# Patient Record
Sex: MALE | Race: BLACK OR AFRICAN AMERICAN | Employment: OTHER | URBAN - METROPOLITAN AREA
[De-identification: names, ages, dates, MRNs, and addresses within clinical notes are randomized per-mention and may not be internally consistent; named-entity substitution may affect disease eponyms.]

---

## 2017-01-24 ENCOUNTER — HOSPITAL ENCOUNTER (OUTPATIENT)
Dept: MRI IMAGING | Age: 77
Discharge: HOME | End: 2017-01-24
Attending: SPECIALIST
Payer: MEDICARE

## 2017-01-24 DIAGNOSIS — M51.35 DDD (DEGENERATIVE DISC DISEASE), THORACOLUMBAR: ICD-10-CM

## 2017-01-24 LAB — CREAT UR-MCNC: 1 MG/DL (ref 0.6–1.3)

## 2017-01-24 PROCEDURE — 72158 MRI LUMBAR SPINE W/O & W/DYE: CPT

## 2017-01-24 PROCEDURE — 74011250636 HC RX REV CODE- 250/636: Performed by: SPECIALIST

## 2017-01-24 PROCEDURE — A9585 GADOBUTROL INJECTION: HCPCS | Performed by: SPECIALIST

## 2017-01-24 PROCEDURE — 82565 ASSAY OF CREATININE: CPT

## 2017-01-24 RX ADMIN — GADOBUTROL 7.5 ML: 604.72 INJECTION INTRAVENOUS at 16:14

## 2017-02-06 ENCOUNTER — HOSPITAL ENCOUNTER (OUTPATIENT)
Age: 77
Setting detail: OUTPATIENT SURGERY
Discharge: HOME | End: 2017-02-06
Attending: SURGERY | Admitting: SURGERY
Payer: MEDICARE

## 2017-02-06 ENCOUNTER — SURGERY (OUTPATIENT)
Age: 77
End: 2017-02-06

## 2017-02-06 VITALS
WEIGHT: 206.1 LBS | TEMPERATURE: 97.1 F | HEIGHT: 71 IN | RESPIRATION RATE: 16 BRPM | HEART RATE: 67 BPM | SYSTOLIC BLOOD PRESSURE: 127 MMHG | BODY MASS INDEX: 28.85 KG/M2 | OXYGEN SATURATION: 96 % | DIASTOLIC BLOOD PRESSURE: 94 MMHG

## 2017-02-06 PROCEDURE — 74011000250 HC RX REV CODE- 250: Performed by: SURGERY

## 2017-02-06 PROCEDURE — 88305 TISSUE EXAM BY PATHOLOGIST: CPT | Performed by: SURGERY

## 2017-02-06 PROCEDURE — 99152 MOD SED SAME PHYS/QHP 5/>YRS: CPT

## 2017-02-06 PROCEDURE — 74011250636 HC RX REV CODE- 250/636

## 2017-02-06 PROCEDURE — 74011250636 HC RX REV CODE- 250/636: Performed by: SURGERY

## 2017-02-06 PROCEDURE — 88342 IMHCHEM/IMCYTCHM 1ST ANTB: CPT | Performed by: SURGERY

## 2017-02-06 PROCEDURE — 77010033678 HC OXYGEN DAILY

## 2017-02-06 PROCEDURE — 76040000007: Performed by: SURGERY

## 2017-02-06 RX ORDER — ATROPINE SULFATE 0.1 MG/ML
0.5 INJECTION INTRAVENOUS
Status: CANCELLED | OUTPATIENT
Start: 2017-02-06 | End: 2017-02-06

## 2017-02-06 RX ORDER — MIDAZOLAM HYDROCHLORIDE 1 MG/ML
.25-5 INJECTION, SOLUTION INTRAMUSCULAR; INTRAVENOUS
Status: DISCONTINUED | OUTPATIENT
Start: 2017-02-06 | End: 2017-02-06 | Stop reason: HOSPADM

## 2017-02-06 RX ORDER — FLUMAZENIL 0.1 MG/ML
0.2 INJECTION INTRAVENOUS
Status: DISCONTINUED | OUTPATIENT
Start: 2017-02-06 | End: 2017-02-06 | Stop reason: HOSPADM

## 2017-02-06 RX ORDER — SODIUM CHLORIDE 0.9 % (FLUSH) 0.9 %
5-10 SYRINGE (ML) INJECTION AS NEEDED
Status: CANCELLED | OUTPATIENT
Start: 2017-02-06 | End: 2017-02-06

## 2017-02-06 RX ORDER — TESTOSTERONE 10 MG/.5G
GEL, METERED TOPICAL DAILY
COMMUNITY

## 2017-02-06 RX ORDER — TADALAFIL 5 MG/1
5 TABLET ORAL AS NEEDED
COMMUNITY

## 2017-02-06 RX ORDER — EPINEPHRINE 0.1 MG/ML
1 INJECTION INTRACARDIAC; INTRAVENOUS
Status: CANCELLED | OUTPATIENT
Start: 2017-02-06 | End: 2017-02-06

## 2017-02-06 RX ORDER — NALOXONE HYDROCHLORIDE 0.4 MG/ML
0.4 INJECTION, SOLUTION INTRAMUSCULAR; INTRAVENOUS; SUBCUTANEOUS
Status: DISCONTINUED | OUTPATIENT
Start: 2017-02-06 | End: 2017-02-06 | Stop reason: HOSPADM

## 2017-02-06 RX ORDER — SODIUM CHLORIDE 9 MG/ML
100 INJECTION, SOLUTION INTRAVENOUS CONTINUOUS
Status: DISCONTINUED | OUTPATIENT
Start: 2017-02-06 | End: 2017-02-06 | Stop reason: HOSPADM

## 2017-02-06 RX ORDER — DEXTROMETHORPHAN/PSEUDOEPHED 2.5-7.5/.8
1.2 DROPS ORAL
Status: CANCELLED | OUTPATIENT
Start: 2017-02-06

## 2017-02-06 RX ORDER — SODIUM CHLORIDE 0.9 % (FLUSH) 0.9 %
5-10 SYRINGE (ML) INJECTION EVERY 8 HOURS
Status: CANCELLED | OUTPATIENT
Start: 2017-02-06 | End: 2017-02-06

## 2017-02-06 RX ORDER — FENTANYL CITRATE 50 UG/ML
100 INJECTION, SOLUTION INTRAMUSCULAR; INTRAVENOUS
Status: DISCONTINUED | OUTPATIENT
Start: 2017-02-06 | End: 2017-02-06 | Stop reason: HOSPADM

## 2017-02-06 RX ADMIN — MIDAZOLAM HYDROCHLORIDE 1 MG: 1 INJECTION, SOLUTION INTRAMUSCULAR; INTRAVENOUS at 14:24

## 2017-02-06 RX ADMIN — SODIUM CHLORIDE 100 ML/HR: 900 INJECTION, SOLUTION INTRAVENOUS at 15:23

## 2017-02-06 RX ADMIN — SODIUM CHLORIDE 100 ML/HR: 900 INJECTION, SOLUTION INTRAVENOUS at 13:22

## 2017-02-06 RX ADMIN — MIDAZOLAM HYDROCHLORIDE 3 MG: 1 INJECTION, SOLUTION INTRAMUSCULAR; INTRAVENOUS at 14:14

## 2017-02-06 RX ADMIN — FENTANYL CITRATE 25 MCG: 50 INJECTION, SOLUTION INTRAMUSCULAR; INTRAVENOUS at 14:19

## 2017-02-06 RX ADMIN — MIDAZOLAM HYDROCHLORIDE 1 MG: 1 INJECTION, SOLUTION INTRAMUSCULAR; INTRAVENOUS at 14:18

## 2017-02-06 RX ADMIN — FENTANYL CITRATE 50 MCG: 50 INJECTION, SOLUTION INTRAMUSCULAR; INTRAVENOUS at 14:15

## 2017-02-06 RX ADMIN — MIDAZOLAM HYDROCHLORIDE 1 MG: 1 INJECTION, SOLUTION INTRAMUSCULAR; INTRAVENOUS at 14:16

## 2017-02-06 RX ADMIN — MIDAZOLAM HYDROCHLORIDE 1 MG: 1 INJECTION, SOLUTION INTRAMUSCULAR; INTRAVENOUS at 14:17

## 2017-02-06 RX ADMIN — BENZOCAINE 2 SPRAY: 220 SPRAY, METERED PERIODONTAL at 14:12

## 2017-02-06 RX ADMIN — FENTANYL CITRATE 25 MCG: 50 INJECTION, SOLUTION INTRAMUSCULAR; INTRAVENOUS at 14:16

## 2017-02-06 NOTE — DISCHARGE INSTRUCTIONS
Pt may be discharged home   RTO in one week. Diet advance as tolerated. Resume home meds    DISCHARGE SUMMARY from Nurse    The following personal items are in your possession at time of discharge:    Dental Appliances: None                               PATIENT INSTRUCTIONS:    After general anesthesia or intravenous sedation, for 24 hours or while taking prescription Narcotics:  · Limit your activities  · Do not drive and operate hazardous machinery  · Do not make important personal or business decisions  · Do  not drink alcoholic beverages  · If you have not urinated within 8 hours after discharge, please contact your surgeon on call. Report the following to your surgeon:  · Excessive pain, swelling, redness or odor of or around the surgical area  · Temperature over 100.5  · Nausea and vomiting lasting longer than 4 hours or if unable to take medications  · Any signs of decreased circulation or nerve impairment to extremity: change in color, persistent  numbness, tingling, coldness or increase pain  · Any questions        What to do at Home:  Recommended activity: Activity as tolerated and no driving for today,     If you experience any of the following symptoms as above, please follow up with Dr. Sutton Sender. *  Please give a list of your current medications to your Primary Care Provider. *  Please update this list whenever your medications are discontinued, doses are      changed, or new medications (including over-the-counter products) are added. *  Please carry medication information at all times in case of emergency situations. These are general instructions for a healthy lifestyle:    No smoking/ No tobacco products/ Avoid exposure to second hand smoke    Surgeon General's Warning:  Quitting smoking now greatly reduces serious risk to your health.     Obesity, smoking, and sedentary lifestyle greatly increases your risk for illness    A healthy diet, regular physical exercise & weight monitoring are important for maintaining a healthy lifestyle    You may be retaining fluid if you have a history of heart failure or if you experience any of the following symptoms:  Weight gain of 3 pounds or more overnight or 5 pounds in a week, increased swelling in our hands or feet or shortness of breath while lying flat in bed. Please call your doctor as soon as you notice any of these symptoms; do not wait until your next office visit. Recognize signs and symptoms of STROKE:    F-face looks uneven    A-arms unable to move or move unevenly    S-speech slurred or non-existent    T-time-call 911 as soon as signs and symptoms begin-DO NOT go       Back to bed or wait to see if you get better-TIME IS BRAIN. Warning Signs of HEART ATTACK     Call 911 if you have these symptoms:   Chest discomfort. Most heart attacks involve discomfort in the center of the chest that lasts more than a few minutes, or that goes away and comes back. It can feel like uncomfortable pressure, squeezing, fullness, or pain.  Discomfort in other areas of the upper body. Symptoms can include pain or discomfort in one or both arms, the back, neck, jaw, or stomach.  Shortness of breath with or without chest discomfort.  Other signs may include breaking out in a cold sweat, nausea, or lightheadedness. Don't wait more than five minutes to call 911 - MINUTES MATTER! Fast action can save your life. Calling 911 is almost always the fastest way to get lifesaving treatment. Emergency Medical Services staff can begin treatment when they arrive -- up to an hour sooner than if someone gets to the hospital by car. The discharge information has been reviewed with the patient and caregiver. The patient and caregiver verbalized understanding. Discharge medications reviewed with the patient and caregiver and appropriate educational materials and side effects teaching were provided.     Patient armband removed and shredded

## 2017-02-06 NOTE — BRIEF OP NOTE
BRIEF OPERATIVE NOTE    Date of Procedure: 2/6/2017   Preoperative Diagnosis: DIFF SWALLOWING W/GERD  Postoperative Diagnosis: EROSIVE ESOPHAGITIS    Procedure(s):  EGD  Surgeon(s) and Role:     * Antonieta Varghese MD - Primary            Surgical Staff:  Endoscopy Technician-1: Zainab Mina  Endoscopy RN-1: Glory Garcia RN  No case tracking events are documented in the log.   Anesthesia: Con-Sed   Estimated Blood Loss: 1 ml  Specimens:   ID Type Source Tests Collected by Time Destination   1 : biopsy E G JUNCTION Preservative   Antonieta Varghese MD 2/6/2017 1422 Pathology      Findings: erosive esophagitis, possible Zenker's diverticulum   Complications: not any identified during surgery  Implants: * No implants in log *

## 2017-02-06 NOTE — IP AVS SNAPSHOT
Merari Sadler 
 
 
 [address] [facility] [address] 
[phone] Patient: Tramaine Crouch MRN: [id number] GWL:[birth year] You are allergic to the following Allergen Reactions Norvasc (Amlodipine) Cough Tolectin (Tolmetin) Hives Recent Documentation Height Weight BMI Smoking Status 1.803 m 93.5 kg 28.75 kg/m2 Former Smoker Emergency Contacts Name Discharge Info Relation Home Work Mobile Mary iDaz CAREGIVER [3] Friend [5]   [phone] About your hospitalization You were admitted on:  February 6, 2017 You last received care in the:  [facility] ENDOSCOPY You were discharged on:  February 6, 2017 Unit phone number:  [phone] Why you were hospitalized Your primary diagnosis was:  Not on File Providers Seen During Your Hospitalizations Provider Role Specialty Primary office phone Marilynn Escobar MD Attending Provider General Surgery [phone] Your Primary Care Physician (PCP) Primary Care Physician Office Phone Office Fax Carlo Willis [phone] Follow-up Information Follow up With Details Comments Contact Info Rk Wood MD   [address] [place] [address] 
[phone] Current Discharge Medication List  
  
CONTINUE these medications which have NOT CHANGED Dose & Instructions Dispensing Information Comments Morning Noon Evening Bedtime  
 allopurinol 100 mg tablet Commonly known as:  Acquanetta Bunk Your next dose is: Today, Tomorrow Other:  _________ Dose:  100 mg Take 100 mg by mouth daily. Refills:  0 BENEFIBER SUGAR FREE(GUAR GUM) PO Your next dose is: Today, Tomorrow Other:  _________ Take  by mouth daily. Refills:  0 EXFORGE 5-160 mg per tablet Generic drug:  amLODIPine-valsartan Your next dose is: Today, Tomorrow Other:  _________ Dose:  1 Tab Take 1 Tab by mouth daily. Pt instructed to take with a small bit of water on DOS Refills:  0  
     
   
   
   
  
 furosemide 20 mg tablet Commonly known as:  LASIX Your next dose is: Today, Tomorrow Other:  _________ Dose:  20 mg Take 20 mg by mouth as needed. Refills:  0  
     
   
   
   
  
 glyBURIDE-metFORMIN 2.5-500 mg per tablet Commonly known as:  Veronica Shoulders Your next dose is: Today, Tomorrow Other:  _________ Dose:  1 Tab Take 1 Tab by mouth as needed. For Glucose >150 in the evening Refills:  0 LIPITOR 10 mg tablet Generic drug:  atorvastatin Your next dose is: Today, Tomorrow Other:  _________ Dose:  10 mg Take 10 mg by mouth daily. Refills:  0  
     
   
   
   
  
 metoprolol succinate 100 mg tablet Commonly known as:  TOPROL-XL Your next dose is: Today, Tomorrow Other:  _________ Dose:  100 mg Take 100 mg by mouth daily. Take with sip of water dos   Indications: HYPERTENSION Refills:  0 NexIUM 40 mg capsule Generic drug:  esomeprazole Your next dose is: Today, Tomorrow Other:  _________ Dose:  40 mg Take 40 mg by mouth daily. Take with sip of water dos Refills:  0  
     
   
   
   
  
 oxyCODONE-acetaminophen  mg per tablet Commonly known as:  PERCOCET 10 Your next dose is: Today, Tomorrow Other:  _________ Dose:  1 Tab Take 1 Tab by mouth every four (4) hours as needed for Pain. Max Daily Amount: 6 Tabs. Quantity:  35 Tab Refills:  0 PROBIOTIC 4X 10-15 mg Tbec Generic drug:  B.infantis-B.ani-B.long-B.bifi Your next dose is: Today, Tomorrow Other:  _________ Take  by mouth. Refills:  0 ASK your doctor about these medications Dose & Instructions Dispensing Information Comments Morning Noon Evening Bedtime AMBIEN 5 mg tablet Generic drug:  zolpidem Your next dose is: Today, Tomorrow Other:  _________ Dose:  5 mg Take 5 mg by mouth nightly as needed for Sleep. Refills:  0 CIALIS 5 mg tablet Generic drug:  tadalafil Your next dose is: Today, Tomorrow Other:  _________ Dose:  5 mg Take 5 mg by mouth. Refills:  0  
     
   
   
   
  
 FLORASTOR 250 mg capsule Generic drug:  Saccharomyces boulardii Your next dose is: Today, Tomorrow Other:  _________ Dose:  250 mg Take 250 mg by mouth daily as needed. Refills:  0 FORTESTA 10 mg/0.5 gram /actuation Glpm  
Generic drug:  testosterone Your next dose is: Today, Tomorrow Other:  _________  
   
   
 by TransDERmal route. Refills:  0 JANUVIA 100 mg tablet Generic drug:  SITagliptin Your next dose is: Today, Tomorrow Other:  _________ Dose:  100 mg Take 100 mg by mouth daily. Refills:  0 MICARDIS HCT 80-12.5 mg per tablet Generic drug:  telmisartan-hydroCHLOROthiazide Your next dose is: Today, Tomorrow Other:  _________ Dose:  1 Tab Take 1 Tab by mouth daily. Take with sip of water dos  Indications: HYPERTENSION Refills:  0  
     
   
   
   
  
 potassium chloride SR 8 mEq tablet Commonly known as:  KLOR-CON 8 Your next dose is: Today, Tomorrow Other:  _________ Dose:  8 mEq Take 8 mEq by mouth daily as needed. Indications: HYPOKALEMIA PREVENTION Refills:  0  
     
   
   
   
  
 SYSTANE (PROPYLENE GLYCOL) 0.4-0.3 % Drop Generic drug:  peg 400-propylene glycol Your next dose is: Today, Tomorrow Other:  _________ Dose:  1 Drop Administer 1 Drop to left eye two (2) times a day. Indications: DRY EYE Refills:  0 Discharge Instructions Pt may be discharged home RTO in one week. Diet advance as tolerated. Resume home meds DISCHARGE SUMMARY from Nurse The following personal items are in your possession at time of discharge: 
 
Dental Appliances: None PATIENT INSTRUCTIONS: 
 
 
F-face looks uneven A-arms unable to move or move unevenly S-speech slurred or non-existent T-time-call 911 as soon as signs and symptoms begin-DO NOT go Back to bed or wait to see if you get better-TIME IS BRAIN. Warning Signs of HEART ATTACK Call 911 if you have these symptoms: 
? Chest discomfort. Most heart attacks involve discomfort in the center of the chest that lasts more than a few minutes, or that goes away and comes back. It can feel like uncomfortable pressure, squeezing, fullness, or pain. ? Discomfort in other areas of the upper body. Symptoms can include pain or discomfort in one or both arms, the back, neck, jaw, or stomach. ? Shortness of breath with or without chest discomfort. ? Other signs may include breaking out in a cold sweat, nausea, or lightheadedness. Don't wait more than five minutes to call [address]! Fast action can save your life. Calling 911 is almost always the fastest way to get lifesaving treatment. Emergency Medical Services staff can begin treatment when they arrive  up to an hour sooner than if someone gets to the hospital by car.   
 
 
The discharge information has been reviewed with the patient and caregiver. The patient and caregiver verbalized understanding. Discharge medications reviewed with the patient and caregiver and appropriate educational materials and side effects teaching were provided. Patient armband removed and shredded Discharge Orders None Introducing [facility]! [facility] introduces [employer] patient portal. Now you can access parts of your medical record, email your doctor's office, and request medication refills online. 1. In your internet browser, go to [email or web address] 2. Click on the First Time User? Click Here link in the Sign In box. You will see the New Member Sign Up page. 3. Enter your [employer] Access Code exactly as it appears below. You will not need to use this code after youve completed the sign-up process. If you do not sign up before the expiration date, you must request a new code. · [employer] Access Code: [email or web address] Expires: 4/20/2017 10:21 AM 
 
4. Enter the last four digits of your Social Security Number (xxxx) and Date of Birth (mm/dd/yyyy) as indicated and click Submit. You will be taken to the next sign-up page. 5. Create a N[employer]osh[employer] ID. This will be your N[employer] login ID and cannot be changed, so think of one that is secure and easy to remember. 6. Create a N[employer] password. You can change your password at any time. 7. Enter your Password Reset Question and Answer. This can be used at a later time if you forget your password. 8. Enter your e-mail address. You will receive e-mail notification when new information is available in [address] 9. Click Sign Up. You can now view and download portions of your medical record. 10. Click the Download Summary menu link to download a portable copy of your medical information. If you have questions, please visit the Frequently Asked Questions section of the [employer] website.  Remember, [employer] is NOT to be used for urgent needs. For medical emergencies, dial 911. Now available from your iPhone and Android! General Information Please provide this summary of care documentation to your next provider. Patient Signature:  ____________________________________________________________ Date:  ____________________________________________________________  
  
Aylett Shove Provider Signature:  ____________________________________________________________ Date:  ____________________________________________________________

## 2017-02-06 NOTE — PROCEDURES
[address] [place] [address]  PROCEDURE NOTE    Name:  Miguel A Han  MR#:  [id number]  :  1940  Account #:  [de-identified]  Date of Adm:  2017  Date of Procedure:  2017      PREOPERATIVE DIAGNOSIS: Erosive esophagitis with possible  Schatzki ring. POSTOPERATIVE DIAGNOSES  1. Severe erosive esophagitis. 2. Edema in the antrum of the stomach with poor emptying of the  stomach contained a fair amount of fluid. 3. Normal duodenum. 4. Possible Zenker diverticulum. PROCEDURES PERFORMED: Upper gastrointestinal endoscopy with  biopsy. SURGEON: Ashleigh Hernandez MD    ANESTHESIA: Versed, a total of 7 mg and fentanyl, a total of 100 mcg. ESTIMATED BLOOD LOSS: Nil, 1 mL. FLUID REPLACEMENT: Approximately 200 mL. DRAINS: None. SPECIMENS REMOVED: Biopsies from the GE junction. COMPLICATIONS: There is no complication identified during operative  procedure. INDICATIONS FOR PROCEDURE: This is a very pleasant 70-year-old  gentleman who has been having difficulty with swallowing. About 6  months ago, we performed an upper GI endoscopy and noted rather  extensive erosion of the GE junction. The question of a Schatzki ring  developing further disease process was a major concern. We did put  the patient on Nexium at that time, and he has been on it since then. The patient has been advised to follow up upper GI endoscopy. DESCRIPTION OF PROCEDURE: The patient was taken to the  endoscopy suite on 2017. On the gurney, the patient had  intravenous access established, appropriate monitoring  technique established. Then, the posterior oropharynx was thoroughly  anesthetized with Cetacaine spray. He was given increments of  Versed and fentanyl to reach the final destination above. The patient  was placed in his left lateral decubitus position for best aspiration and  protection.  The fiberoptic endoscope was introduced in the oropharynx,  down the esophagus, into the stomach, which again was quite  edematous and finally into the antrum, pylorus, and duodenum. We  were well down into the C portion of the duodenum. There were no  ulcerations or scarring noted in the duodenum. Back in the antrum and  body of the stomach, we identified a fair amount of edema, fair amount  of retained fluid which appeared to be a poor emptying process. Scope  was retroflexed upon itself, did demonstrate a hiatal hernia with  evidence of reflux. We posed the scope back to a straight position and  we identified no masses in the stomach, body, fundus, or antrum  region. At the GE junction, there is extensive erosion noted. Biopsies  were taken. The esophagus itself appeared somewhat tortuous, but no  intraluminal lesions noted. Back at the posterior oropharynx, the  patient had a small opening off to the right side of his oropharyngeal  junction that would suggest possible early Zenker diverticulum. No  biopsies were taken of this. At the posterior oropharynx, larynx, the  vocal cords were visualized. There appeared to be a slight amount of  edema along the posterior oropharynx, larynx, and vocal cords. There  were no lesions noted. No bleeding. At this point, the procedure was  complete and the patient was taken to the recovery room for further  observation and recovery. Please note that multiple biopsies were  taken at the GE junction and we will await the final results.         MD MARIETTA Blanco / ELLIOT  D:  02/06/2017   14:50  T:  02/06/2017   15:33  Job #:  [id number]

## 2017-07-24 ENCOUNTER — HOSPITAL ENCOUNTER (OUTPATIENT)
Dept: PREADMISSION TESTING | Age: 77
Discharge: HOME | DRG: 517 | End: 2017-07-24
Payer: MEDICARE

## 2017-07-24 LAB
ANION GAP BLD CALC-SCNC: 10 MMOL/L (ref 3–18)
ATRIAL RATE: 74 BPM
BACTERIA SPEC CULT: NORMAL
BUN SERPL-MCNC: 21 MG/DL (ref 7–18)
BUN/CREAT SERPL: 15 (ref 12–20)
CALCIUM SERPL-MCNC: 9 MG/DL (ref 8.5–10.1)
CALCULATED P AXIS, ECG09: 48 DEGREES
CALCULATED R AXIS, ECG10: -41 DEGREES
CALCULATED T AXIS, ECG11: 40 DEGREES
CHLORIDE SERPL-SCNC: 104 MMOL/L (ref 100–108)
CO2 SERPL-SCNC: 24 MMOL/L (ref 21–32)
CREAT SERPL-MCNC: 1.38 MG/DL (ref 0.6–1.3)
DIAGNOSIS, 93000: NORMAL
EST. AVERAGE GLUCOSE BLD GHB EST-MCNC: 140 MG/DL
GLUCOSE SERPL-MCNC: 180 MG/DL (ref 74–99)
HBA1C MFR BLD: 6.5 % (ref 4.5–5.6)
HCT VFR BLD AUTO: 38.4 % (ref 36–48)
HGB BLD-MCNC: 13.3 G/DL (ref 13–16)
P-R INTERVAL, ECG05: 170 MS
POTASSIUM SERPL-SCNC: 3.7 MMOL/L (ref 3.5–5.5)
Q-T INTERVAL, ECG07: 370 MS
QRS DURATION, ECG06: 86 MS
QTC CALCULATION (BEZET), ECG08: 410 MS
SERVICE CMNT-IMP: NORMAL
SODIUM SERPL-SCNC: 138 MMOL/L (ref 136–145)
VENTRICULAR RATE, ECG03: 74 BPM

## 2017-07-25 ENCOUNTER — ANESTHESIA EVENT (OUTPATIENT)
Dept: SURGERY | Age: 77
DRG: 517 | End: 2017-07-25
Payer: MEDICARE

## 2017-07-26 ENCOUNTER — APPOINTMENT (OUTPATIENT)
Dept: GENERAL RADIOLOGY | Age: 77
DRG: 517 | End: 2017-07-26
Attending: SPECIALIST
Payer: MEDICARE

## 2017-07-26 ENCOUNTER — HOSPITAL ENCOUNTER (INPATIENT)
Age: 77
LOS: 1 days | Discharge: HOME | DRG: 517 | End: 2017-07-27
Attending: SPECIALIST | Admitting: SPECIALIST
Payer: MEDICARE

## 2017-07-26 ENCOUNTER — ANESTHESIA (OUTPATIENT)
Dept: SURGERY | Age: 77
DRG: 517 | End: 2017-07-26
Payer: MEDICARE

## 2017-07-26 PROBLEM — M48.062 LUMBAR STENOSIS WITH NEUROGENIC CLAUDICATION: Status: ACTIVE | Noted: 2017-07-26

## 2017-07-26 LAB
ABO + RH BLD: NORMAL
BLOOD GROUP ANTIBODIES SERPL: NORMAL
GLUCOSE BLD STRIP.AUTO-MCNC: 102 MG/DL (ref 70–110)
GLUCOSE BLD STRIP.AUTO-MCNC: 116 MG/DL (ref 70–110)
GLUCOSE BLD STRIP.AUTO-MCNC: 127 MG/DL (ref 70–110)
GLUCOSE BLD STRIP.AUTO-MCNC: 284 MG/DL (ref 70–110)
GLUCOSE BLD STRIP.AUTO-MCNC: 466 MG/DL (ref 70–110)
GLUCOSE SERPL-MCNC: 446 MG/DL (ref 74–99)
SPECIMEN EXP DATE BLD: NORMAL

## 2017-07-26 PROCEDURE — 77030003666 HC NDL SPINAL BD -A: Performed by: SPECIALIST

## 2017-07-26 PROCEDURE — 01NB0ZZ RELEASE LUMBAR NERVE, OPEN APPROACH: ICD-10-PCS | Performed by: SPECIALIST

## 2017-07-26 PROCEDURE — 97161 PT EVAL LOW COMPLEX 20 MIN: CPT

## 2017-07-26 PROCEDURE — 77030013079 HC BLNKT BAIR HGGR 3M -A: Performed by: SPECIALIST

## 2017-07-26 PROCEDURE — 77030004391 HC BUR FLUT MEDT -C: Performed by: SPECIALIST

## 2017-07-26 PROCEDURE — 86900 BLOOD TYPING SEROLOGIC ABO: CPT | Performed by: SPECIALIST

## 2017-07-26 PROCEDURE — 74011250636 HC RX REV CODE- 250/636: Performed by: SPECIALIST

## 2017-07-26 PROCEDURE — 82962 GLUCOSE BLOOD TEST: CPT

## 2017-07-26 PROCEDURE — 77030032490 HC SLV COMPR SCD KNE COVD -B: Performed by: SPECIALIST

## 2017-07-26 PROCEDURE — 82947 ASSAY GLUCOSE BLOOD QUANT: CPT

## 2017-07-26 PROCEDURE — 74011636637 HC RX REV CODE- 636/637: Performed by: SPECIALIST

## 2017-07-26 PROCEDURE — 77030014005 HC SPNG HEMSTAT GEL CARD -B: Performed by: SPECIALIST

## 2017-07-26 PROCEDURE — 74011000250 HC RX REV CODE- 250

## 2017-07-26 PROCEDURE — 74011250636 HC RX REV CODE- 250/636

## 2017-07-26 PROCEDURE — 77030008477 HC STYL SATN SLP COVD -A: Performed by: SPECIALIST

## 2017-07-26 PROCEDURE — 77030014007 HC SPNG HEMSTAT J&J -B: Performed by: SPECIALIST

## 2017-07-26 PROCEDURE — 74011250636 HC RX REV CODE- 250/636: Performed by: ANESTHESIOLOGY

## 2017-07-26 PROCEDURE — 77030004373 HC BUR DMND MEDT -C: Performed by: SPECIALIST

## 2017-07-26 PROCEDURE — 77030010507 HC ADH SKN DERMBND J&J -B: Performed by: SPECIALIST

## 2017-07-26 PROCEDURE — 77030003029 HC SUT VCRL J&J -B: Performed by: SPECIALIST

## 2017-07-26 PROCEDURE — 76210000006 HC OR PH I REC 0.5 TO 1 HR: Performed by: SPECIALIST

## 2017-07-26 PROCEDURE — 77030011640 HC PAD GRND REM COVD -A: Performed by: SPECIALIST

## 2017-07-26 PROCEDURE — 74011000250 HC RX REV CODE- 250: Performed by: SPECIALIST

## 2017-07-26 PROCEDURE — 36415 COLL VENOUS BLD VENIPUNCTURE: CPT | Performed by: SPECIALIST

## 2017-07-26 PROCEDURE — 77030018836 HC SOL IRR NACL ICUM -A: Performed by: SPECIALIST

## 2017-07-26 PROCEDURE — 77030002935 HC SUT MCRYL J&J -C: Performed by: SPECIALIST

## 2017-07-26 PROCEDURE — 77030031139 HC SUT VCRL2 J&J -A: Performed by: SPECIALIST

## 2017-07-26 PROCEDURE — 77030008683 HC TU ET CUF COVD -A: Performed by: SPECIALIST

## 2017-07-26 PROCEDURE — 65270000029 HC RM PRIVATE

## 2017-07-26 PROCEDURE — 74011250637 HC RX REV CODE- 250/637: Performed by: SPECIALIST

## 2017-07-26 PROCEDURE — 97116 GAIT TRAINING THERAPY: CPT

## 2017-07-26 PROCEDURE — 76060000034 HC ANESTHESIA 1.5 TO 2 HR: Performed by: SPECIALIST

## 2017-07-26 PROCEDURE — 76010000153 HC OR TIME 1.5 TO 2 HR: Performed by: SPECIALIST

## 2017-07-26 PROCEDURE — 77030020782 HC GWN BAIR PAWS FLX 3M -B: Performed by: SPECIALIST

## 2017-07-26 RX ORDER — TELMISARTAN 80 MG/1
80 TABLET ORAL DAILY
Status: DISCONTINUED | OUTPATIENT
Start: 2017-07-27 | End: 2017-07-27 | Stop reason: HOSPADM

## 2017-07-26 RX ORDER — CEFAZOLIN SODIUM 2 G/50ML
2 SOLUTION INTRAVENOUS EVERY 8 HOURS
Status: COMPLETED | OUTPATIENT
Start: 2017-07-26 | End: 2017-07-27

## 2017-07-26 RX ORDER — POLYETHYLENE GLYCOL 3350 17 G/17G
17 POWDER, FOR SOLUTION ORAL DAILY PRN
Status: DISCONTINUED | OUTPATIENT
Start: 2017-07-26 | End: 2017-07-27 | Stop reason: HOSPADM

## 2017-07-26 RX ORDER — FENTANYL CITRATE 50 UG/ML
INJECTION, SOLUTION INTRAMUSCULAR; INTRAVENOUS AS NEEDED
Status: DISCONTINUED | OUTPATIENT
Start: 2017-07-26 | End: 2017-07-26 | Stop reason: HOSPADM

## 2017-07-26 RX ORDER — SODIUM CHLORIDE 0.9 % (FLUSH) 0.9 %
5-10 SYRINGE (ML) INJECTION EVERY 8 HOURS
Status: DISCONTINUED | OUTPATIENT
Start: 2017-07-26 | End: 2017-07-27 | Stop reason: HOSPADM

## 2017-07-26 RX ORDER — SODIUM CHLORIDE 0.9 % (FLUSH) 0.9 %
5-10 SYRINGE (ML) INJECTION AS NEEDED
Status: DISCONTINUED | OUTPATIENT
Start: 2017-07-26 | End: 2017-07-27 | Stop reason: HOSPADM

## 2017-07-26 RX ORDER — DEXTROSE 50 % IN WATER (D50W) INTRAVENOUS SYRINGE
25-50 AS NEEDED
Status: DISCONTINUED | OUTPATIENT
Start: 2017-07-26 | End: 2017-07-27 | Stop reason: HOSPADM

## 2017-07-26 RX ORDER — AMLODIPINE BESYLATE 5 MG/1
10 TABLET ORAL DAILY
Status: DISCONTINUED | OUTPATIENT
Start: 2017-07-27 | End: 2017-07-27 | Stop reason: HOSPADM

## 2017-07-26 RX ORDER — SODIUM CHLORIDE, SODIUM LACTATE, POTASSIUM CHLORIDE, CALCIUM CHLORIDE 600; 310; 30; 20 MG/100ML; MG/100ML; MG/100ML; MG/100ML
125 INJECTION, SOLUTION INTRAVENOUS CONTINUOUS
Status: DISCONTINUED | OUTPATIENT
Start: 2017-07-26 | End: 2017-07-27 | Stop reason: HOSPADM

## 2017-07-26 RX ORDER — MAGNESIUM SULFATE 100 %
4 CRYSTALS MISCELLANEOUS AS NEEDED
Status: DISCONTINUED | OUTPATIENT
Start: 2017-07-26 | End: 2017-07-26 | Stop reason: HOSPADM

## 2017-07-26 RX ORDER — FUROSEMIDE 20 MG/1
20 TABLET ORAL DAILY PRN
Status: DISCONTINUED | OUTPATIENT
Start: 2017-07-26 | End: 2017-07-27 | Stop reason: HOSPADM

## 2017-07-26 RX ORDER — OXYCODONE AND ACETAMINOPHEN 5; 325 MG/1; MG/1
1 TABLET ORAL
Status: DISCONTINUED | OUTPATIENT
Start: 2017-07-26 | End: 2017-07-27 | Stop reason: HOSPADM

## 2017-07-26 RX ORDER — FENTANYL CITRATE 50 UG/ML
25 INJECTION, SOLUTION INTRAMUSCULAR; INTRAVENOUS
Status: CANCELLED | OUTPATIENT
Start: 2017-07-26

## 2017-07-26 RX ORDER — HYDROCHLOROTHIAZIDE 25 MG/1
12.5 TABLET ORAL DAILY
Status: DISCONTINUED | OUTPATIENT
Start: 2017-07-27 | End: 2017-07-27 | Stop reason: HOSPADM

## 2017-07-26 RX ORDER — SODIUM CHLORIDE 0.9 % (FLUSH) 0.9 %
5-10 SYRINGE (ML) INJECTION AS NEEDED
Status: DISCONTINUED | OUTPATIENT
Start: 2017-07-26 | End: 2017-07-26 | Stop reason: HOSPADM

## 2017-07-26 RX ORDER — ONDANSETRON 2 MG/ML
INJECTION INTRAMUSCULAR; INTRAVENOUS AS NEEDED
Status: DISCONTINUED | OUTPATIENT
Start: 2017-07-26 | End: 2017-07-26 | Stop reason: HOSPADM

## 2017-07-26 RX ORDER — INSULIN LISPRO 100 [IU]/ML
INJECTION, SOLUTION INTRAVENOUS; SUBCUTANEOUS ONCE
Status: ACTIVE | OUTPATIENT
Start: 2017-07-26 | End: 2017-07-26

## 2017-07-26 RX ORDER — NEOSTIGMINE METHYLSULFATE 1 MG/ML
INJECTION INTRAVENOUS AS NEEDED
Status: DISCONTINUED | OUTPATIENT
Start: 2017-07-26 | End: 2017-07-26 | Stop reason: HOSPADM

## 2017-07-26 RX ORDER — PROPOFOL 10 MG/ML
INJECTION, EMULSION INTRAVENOUS AS NEEDED
Status: DISCONTINUED | OUTPATIENT
Start: 2017-07-26 | End: 2017-07-26 | Stop reason: HOSPADM

## 2017-07-26 RX ORDER — DIPHENHYDRAMINE HCL 25 MG
25 CAPSULE ORAL
Status: DISCONTINUED | OUTPATIENT
Start: 2017-07-26 | End: 2017-07-27 | Stop reason: HOSPADM

## 2017-07-26 RX ORDER — DEXAMETHASONE SODIUM PHOSPHATE 4 MG/ML
INJECTION, SOLUTION INTRA-ARTICULAR; INTRALESIONAL; INTRAMUSCULAR; INTRAVENOUS; SOFT TISSUE AS NEEDED
Status: DISCONTINUED | OUTPATIENT
Start: 2017-07-26 | End: 2017-07-26 | Stop reason: HOSPADM

## 2017-07-26 RX ORDER — SODIUM CHLORIDE, SODIUM LACTATE, POTASSIUM CHLORIDE, CALCIUM CHLORIDE 600; 310; 30; 20 MG/100ML; MG/100ML; MG/100ML; MG/100ML
100 INJECTION, SOLUTION INTRAVENOUS CONTINUOUS
Status: DISCONTINUED | OUTPATIENT
Start: 2017-07-26 | End: 2017-07-26 | Stop reason: HOSPADM

## 2017-07-26 RX ORDER — SENNOSIDES 8.6 MG/1
1 TABLET ORAL 2 TIMES DAILY
Status: DISCONTINUED | OUTPATIENT
Start: 2017-07-26 | End: 2017-07-27 | Stop reason: HOSPADM

## 2017-07-26 RX ORDER — CHOLECALCIFEROL (VITAMIN D3) 125 MCG
5 CAPSULE ORAL
Status: DISCONTINUED | OUTPATIENT
Start: 2017-07-26 | End: 2017-07-27 | Stop reason: HOSPADM

## 2017-07-26 RX ORDER — GLYBURIDE 5 MG/1
2.5 TABLET ORAL DAILY PRN
Status: DISCONTINUED | OUTPATIENT
Start: 2017-07-26 | End: 2017-07-27 | Stop reason: HOSPADM

## 2017-07-26 RX ORDER — INSULIN LISPRO 100 [IU]/ML
INJECTION, SOLUTION INTRAVENOUS; SUBCUTANEOUS
Status: DISCONTINUED | OUTPATIENT
Start: 2017-07-26 | End: 2017-07-27

## 2017-07-26 RX ORDER — SODIUM CHLORIDE, SODIUM LACTATE, POTASSIUM CHLORIDE, CALCIUM CHLORIDE 600; 310; 30; 20 MG/100ML; MG/100ML; MG/100ML; MG/100ML
75 INJECTION, SOLUTION INTRAVENOUS CONTINUOUS
Status: DISCONTINUED | OUTPATIENT
Start: 2017-07-26 | End: 2017-07-27 | Stop reason: HOSPADM

## 2017-07-26 RX ORDER — LIDOCAINE HYDROCHLORIDE 20 MG/ML
INJECTION, SOLUTION EPIDURAL; INFILTRATION; INTRACAUDAL; PERINEURAL AS NEEDED
Status: DISCONTINUED | OUTPATIENT
Start: 2017-07-26 | End: 2017-07-26 | Stop reason: HOSPADM

## 2017-07-26 RX ORDER — OXYCODONE AND ACETAMINOPHEN 5; 325 MG/1; MG/1
1 TABLET ORAL
COMMUNITY
End: 2017-07-27

## 2017-07-26 RX ORDER — METOPROLOL SUCCINATE 100 MG/1
100 TABLET, EXTENDED RELEASE ORAL DAILY
Status: DISCONTINUED | OUTPATIENT
Start: 2017-07-27 | End: 2017-07-27 | Stop reason: HOSPADM

## 2017-07-26 RX ORDER — DEXTROSE 50 % IN WATER (D50W) INTRAVENOUS SYRINGE
25-50 AS NEEDED
Status: DISCONTINUED | OUTPATIENT
Start: 2017-07-26 | End: 2017-07-26 | Stop reason: HOSPADM

## 2017-07-26 RX ORDER — CEFAZOLIN SODIUM 2 G/50ML
2 SOLUTION INTRAVENOUS ONCE
Status: COMPLETED | OUTPATIENT
Start: 2017-07-26 | End: 2017-07-26

## 2017-07-26 RX ORDER — METFORMIN HYDROCHLORIDE 500 MG/1
500 TABLET ORAL DAILY PRN
Status: DISCONTINUED | OUTPATIENT
Start: 2017-07-26 | End: 2017-07-27 | Stop reason: HOSPADM

## 2017-07-26 RX ORDER — DEXAMETHASONE SODIUM PHOSPHATE 4 MG/ML
4 INJECTION, SOLUTION INTRA-ARTICULAR; INTRALESIONAL; INTRAMUSCULAR; INTRAVENOUS; SOFT TISSUE EVERY 8 HOURS
Status: DISCONTINUED | OUTPATIENT
Start: 2017-07-26 | End: 2017-07-27

## 2017-07-26 RX ORDER — VALSARTAN 160 MG/1
160 TABLET ORAL DAILY
Status: DISCONTINUED | OUTPATIENT
Start: 2017-07-27 | End: 2017-07-27 | Stop reason: HOSPADM

## 2017-07-26 RX ORDER — NALOXONE HYDROCHLORIDE 0.4 MG/ML
0.2 INJECTION, SOLUTION INTRAMUSCULAR; INTRAVENOUS; SUBCUTANEOUS AS NEEDED
Status: DISCONTINUED | OUTPATIENT
Start: 2017-07-26 | End: 2017-07-26 | Stop reason: HOSPADM

## 2017-07-26 RX ORDER — MIDAZOLAM HYDROCHLORIDE 1 MG/ML
INJECTION, SOLUTION INTRAMUSCULAR; INTRAVENOUS AS NEEDED
Status: DISCONTINUED | OUTPATIENT
Start: 2017-07-26 | End: 2017-07-26 | Stop reason: HOSPADM

## 2017-07-26 RX ORDER — NALOXONE HYDROCHLORIDE 0.4 MG/ML
0.1 INJECTION, SOLUTION INTRAMUSCULAR; INTRAVENOUS; SUBCUTANEOUS AS NEEDED
Status: DISCONTINUED | OUTPATIENT
Start: 2017-07-26 | End: 2017-07-27 | Stop reason: HOSPADM

## 2017-07-26 RX ORDER — MAGNESIUM SULFATE 100 %
4 CRYSTALS MISCELLANEOUS AS NEEDED
Status: DISCONTINUED | OUTPATIENT
Start: 2017-07-26 | End: 2017-07-27 | Stop reason: HOSPADM

## 2017-07-26 RX ORDER — HYDROMORPHONE HYDROCHLORIDE 1 MG/ML
0.5 INJECTION, SOLUTION INTRAMUSCULAR; INTRAVENOUS; SUBCUTANEOUS
Status: DISCONTINUED | OUTPATIENT
Start: 2017-07-26 | End: 2017-07-26 | Stop reason: HOSPADM

## 2017-07-26 RX ORDER — AMLODIPINE BESYLATE 5 MG/1
5 TABLET ORAL DAILY
Status: DISCONTINUED | OUTPATIENT
Start: 2017-07-27 | End: 2017-07-26

## 2017-07-26 RX ORDER — POTASSIUM CHLORIDE 750 MG/1
10 TABLET, EXTENDED RELEASE ORAL DAILY
Status: DISCONTINUED | OUTPATIENT
Start: 2017-07-26 | End: 2017-07-27 | Stop reason: HOSPADM

## 2017-07-26 RX ORDER — ONDANSETRON 2 MG/ML
4 INJECTION INTRAMUSCULAR; INTRAVENOUS AS NEEDED
Status: DISCONTINUED | OUTPATIENT
Start: 2017-07-26 | End: 2017-07-27 | Stop reason: HOSPADM

## 2017-07-26 RX ORDER — ZOLPIDEM TARTRATE 5 MG/1
5 TABLET ORAL
Status: DISCONTINUED | OUTPATIENT
Start: 2017-07-26 | End: 2017-07-27 | Stop reason: HOSPADM

## 2017-07-26 RX ORDER — NALOXONE HYDROCHLORIDE 0.4 MG/ML
0.2 INJECTION, SOLUTION INTRAMUSCULAR; INTRAVENOUS; SUBCUTANEOUS AS NEEDED
Status: DISCONTINUED | OUTPATIENT
Start: 2017-07-26 | End: 2017-07-26 | Stop reason: SDUPTHER

## 2017-07-26 RX ORDER — AMLODIPINE AND VALSARTAN 10; 160 MG/1; MG/1
1 TABLET ORAL DAILY
COMMUNITY

## 2017-07-26 RX ORDER — CARBOXYMETHYLCELLULOSE SODIUM 5 MG/ML
1 SOLUTION/ DROPS OPHTHALMIC 2 TIMES DAILY
Status: DISCONTINUED | OUTPATIENT
Start: 2017-07-26 | End: 2017-07-27 | Stop reason: HOSPADM

## 2017-07-26 RX ORDER — ALLOPURINOL 100 MG/1
100 TABLET ORAL 2 TIMES DAILY
Status: DISCONTINUED | OUTPATIENT
Start: 2017-07-26 | End: 2017-07-27 | Stop reason: HOSPADM

## 2017-07-26 RX ORDER — EPHEDRINE SULFATE/0.9% NACL/PF 25 MG/5 ML
SYRINGE (ML) INTRAVENOUS AS NEEDED
Status: DISCONTINUED | OUTPATIENT
Start: 2017-07-26 | End: 2017-07-26 | Stop reason: HOSPADM

## 2017-07-26 RX ORDER — NALOXONE HYDROCHLORIDE 0.4 MG/ML
0.4 INJECTION, SOLUTION INTRAMUSCULAR; INTRAVENOUS; SUBCUTANEOUS AS NEEDED
Status: DISCONTINUED | OUTPATIENT
Start: 2017-07-26 | End: 2017-07-27 | Stop reason: HOSPADM

## 2017-07-26 RX ORDER — ATORVASTATIN CALCIUM 10 MG/1
10 TABLET, FILM COATED ORAL
Status: DISCONTINUED | OUTPATIENT
Start: 2017-07-26 | End: 2017-07-27 | Stop reason: HOSPADM

## 2017-07-26 RX ORDER — INSULIN LISPRO 100 [IU]/ML
INJECTION, SOLUTION INTRAVENOUS; SUBCUTANEOUS ONCE
Status: DISCONTINUED | OUTPATIENT
Start: 2017-07-26 | End: 2017-07-26 | Stop reason: HOSPADM

## 2017-07-26 RX ORDER — ROCURONIUM BROMIDE 10 MG/ML
INJECTION, SOLUTION INTRAVENOUS AS NEEDED
Status: DISCONTINUED | OUTPATIENT
Start: 2017-07-26 | End: 2017-07-26 | Stop reason: HOSPADM

## 2017-07-26 RX ORDER — GLYCOPYRROLATE 0.2 MG/ML
INJECTION INTRAMUSCULAR; INTRAVENOUS AS NEEDED
Status: DISCONTINUED | OUTPATIENT
Start: 2017-07-26 | End: 2017-07-26 | Stop reason: HOSPADM

## 2017-07-26 RX ADMIN — SODIUM CHLORIDE, SODIUM LACTATE, POTASSIUM CHLORIDE, AND CALCIUM CHLORIDE: 600; 310; 30; 20 INJECTION, SOLUTION INTRAVENOUS at 08:45

## 2017-07-26 RX ADMIN — SODIUM CHLORIDE, SODIUM LACTATE, POTASSIUM CHLORIDE, AND CALCIUM CHLORIDE 125 ML/HR: 600; 310; 30; 20 INJECTION, SOLUTION INTRAVENOUS at 07:05

## 2017-07-26 RX ADMIN — INSULIN LISPRO 12 UNITS: 100 INJECTION, SOLUTION INTRAVENOUS; SUBCUTANEOUS at 22:34

## 2017-07-26 RX ADMIN — HYDROMORPHONE HYDROCHLORIDE: 10 INJECTION, SOLUTION INTRAMUSCULAR; INTRAVENOUS; SUBCUTANEOUS at 10:23

## 2017-07-26 RX ADMIN — FENTANYL CITRATE 50 MCG: 50 INJECTION, SOLUTION INTRAMUSCULAR; INTRAVENOUS at 08:17

## 2017-07-26 RX ADMIN — LIDOCAINE HYDROCHLORIDE 80 MG: 20 INJECTION, SOLUTION EPIDURAL; INFILTRATION; INTRACAUDAL; PERINEURAL at 08:17

## 2017-07-26 RX ADMIN — CEFAZOLIN SODIUM 2 G: 2 SOLUTION INTRAVENOUS at 16:06

## 2017-07-26 RX ADMIN — CEFAZOLIN SODIUM 2 G: 2 SOLUTION INTRAVENOUS at 08:10

## 2017-07-26 RX ADMIN — ALLOPURINOL 100 MG: 100 TABLET ORAL at 20:48

## 2017-07-26 RX ADMIN — PROPOFOL 170 MG: 10 INJECTION, EMULSION INTRAVENOUS at 08:17

## 2017-07-26 RX ADMIN — CEFAZOLIN SODIUM 2 G: 2 SOLUTION INTRAVENOUS at 23:34

## 2017-07-26 RX ADMIN — DEXAMETHASONE SODIUM PHOSPHATE 8 MG: 4 INJECTION, SOLUTION INTRA-ARTICULAR; INTRALESIONAL; INTRAMUSCULAR; INTRAVENOUS; SOFT TISSUE at 08:40

## 2017-07-26 RX ADMIN — SITAGLIPTIN 100 MG: 100 TABLET, FILM COATED ORAL at 18:27

## 2017-07-26 RX ADMIN — Medication 10 MG: at 08:49

## 2017-07-26 RX ADMIN — DEXAMETHASONE SODIUM PHOSPHATE 4 MG: 4 INJECTION, SOLUTION INTRAMUSCULAR; INTRAVENOUS at 18:28

## 2017-07-26 RX ADMIN — MIDAZOLAM HYDROCHLORIDE 2 MG: 1 INJECTION, SOLUTION INTRAMUSCULAR; INTRAVENOUS at 08:10

## 2017-07-26 RX ADMIN — POTASSIUM CHLORIDE 10 MEQ: 10 TABLET, EXTENDED RELEASE ORAL at 18:27

## 2017-07-26 RX ADMIN — METFORMIN HYDROCHLORIDE 500 MG: 500 TABLET, FILM COATED ORAL at 19:19

## 2017-07-26 RX ADMIN — ROCURONIUM BROMIDE 50 MG: 10 INJECTION, SOLUTION INTRAVENOUS at 08:17

## 2017-07-26 RX ADMIN — GLYCOPYRROLATE 0.4 MG: 0.2 INJECTION INTRAMUSCULAR; INTRAVENOUS at 09:25

## 2017-07-26 RX ADMIN — NEOSTIGMINE METHYLSULFATE 2 MG: 1 INJECTION INTRAVENOUS at 09:25

## 2017-07-26 RX ADMIN — CARBOXYMETHYLCELLULOSE SODIUM 1 DROP: 5 SOLUTION/ DROPS OPHTHALMIC at 20:47

## 2017-07-26 RX ADMIN — Medication 10 ML: at 20:49

## 2017-07-26 RX ADMIN — Medication 5 MG: at 20:49

## 2017-07-26 RX ADMIN — ATORVASTATIN CALCIUM 10 MG: 10 TABLET, FILM COATED ORAL at 20:48

## 2017-07-26 RX ADMIN — FENTANYL CITRATE 50 MCG: 50 INJECTION, SOLUTION INTRAMUSCULAR; INTRAVENOUS at 08:44

## 2017-07-26 RX ADMIN — GLYCOPYRROLATE 0.2 MG: 0.2 INJECTION INTRAMUSCULAR; INTRAVENOUS at 09:42

## 2017-07-26 RX ADMIN — ONDANSETRON 4 MG: 2 INJECTION INTRAMUSCULAR; INTRAVENOUS at 08:58

## 2017-07-26 NOTE — PERIOP NOTES
Called 2 [employer] for [employer] to look at [employer] and call me back for report

## 2017-07-26 NOTE — IP AVS SNAPSHOT
Alicia Durand 
 
 
 [address] [place] [id number] 
[phone] Patient: Nichole Solis MRN: [id number] KOE:[birth year]11 Current Discharge Medication List  
  
CONTINUE these medications which have NOT CHANGED Dose & Instructions Dispensing Information Comments Morning Noon Evening Bedtime  
 allopurinol 100 mg tablet Commonly known as:  Evorn Slight Your last dose was: Your next dose is:    
   
   
 Dose:  100 mg Take 100 mg by mouth two (2) times a day. Refills:  0  
     
   
   
   
  
 aspirin delayed-release 81 mg tablet Your last dose was: Your next dose is: Take  by mouth daily. Refills:  0 CIALIS 5 mg tablet Generic drug:  tadalafil Your last dose was: Your next dose is:    
   
   
 Dose:  5 mg Take 5 mg by mouth as needed. Refills:  0 EXFORGE  mg per tablet Generic drug:  amLODIPine-valsartan Your last dose was: Your next dose is:    
   
   
 Dose:  1 Tab Take 1 Tab by mouth daily. Refills:  0 FORTESTA 10 mg/0.5 gram /actuation Glpm  
Generic drug:  testosterone Your last dose was: Your next dose is:    
   
   
 by TransDERmal route daily. Indications: 1 pump each thigh daily in am 10 mg Refills:  0  
     
   
   
   
  
 furosemide 20 mg tablet Commonly known as:  LASIX Your last dose was: Your next dose is:    
   
   
 Dose:  20 mg Take 20 mg by mouth as needed. Indications: Edema Refills:  0  
     
   
   
   
  
 glyBURIDE-metFORMIN 2.5-500 mg per tablet Commonly known as:  Verneita Channel Your last dose was: Your next dose is:    
   
   
 Dose:  1 Tab Take 1 Tab by mouth as needed. For Glucose >150 in the evening Refills:  0 JANUVIA 100 mg tablet Generic drug:  SITagliptin Your last dose was: Your next dose is:    
   
   
 Dose:  100 mg Take 100 mg by mouth daily. Refills:  0 LIPITOR 10 mg tablet Generic drug:  atorvastatin Your last dose was: Your next dose is:    
   
   
 Dose:  10 mg Take 10 mg by mouth nightly. Refills:  0  
     
   
   
   
  
 melatonin Tab tablet Your last dose was: Your next dose is:    
   
   
 Dose:  5 mg Take 5 mg by mouth nightly. Refills:  0  
     
   
   
   
  
 metoprolol succinate 100 mg tablet Commonly known as:  TOPROL-XL Your last dose was: Your next dose is:    
   
   
 Dose:  100 mg Take 100 mg by mouth daily. Take with sip of water dos   Indications: HYPERTENSION Refills:  0 MICARDIS HCT 80-12.5 mg per tablet Generic drug:  telmisartan-hydroCHLOROthiazide Your last dose was: Your next dose is:    
   
   
 Dose:  1 Tab Take 1 Tab by mouth daily. Take with sip of water dos  Indications: HYPERTENSION Refills:  0 MIRALAX 17 gram/dose powder Generic drug:  polyethylene glycol Your last dose was: Your next dose is:    
   
   
 Dose:  17 g Take 17 g by mouth daily as needed. Refills:  0  
     
   
   
   
  
 multivitamin tablet Commonly known as:  ONE A DAY Your last dose was: Your next dose is:    
   
   
 Dose:  1 Tab Take 1 Tab by mouth daily. Refills:  0  
     
   
   
   
  
 oxyCODONE-acetaminophen 5-325 mg per tablet Commonly known as:  PERCOCET Your last dose was: Your next dose is:    
   
   
 Dose:  1 Tab Take 1 Tab by mouth every four (4) hours as needed for Pain. Max Daily Amount: 6 Tabs. Quantity:  40 Tab Refills:  0  
     
   
   
   
  
 potassium chloride SR 8 mEq tablet Commonly known as:  KLOR-CON 8 Your last dose was: Your next dose is:    
   
   
 Dose:  8 mEq Take 8 mEq by mouth daily as needed. Indications: HYPOKALEMIA PREVENTION Refills:  0 REFRESH LIQUIGEL 1 % Dlgl Generic drug:  carboxymethylcellulose sodium Your last dose was: Your next dose is:    
   
   
 Apply  to eye two (2) times a day. Indications: DRY EYE Refills:  0 Where to Get Your Medications Information on where to get these meds will be given to you by the nurse or doctor. ! Ask your nurse or doctor about these medications  
  oxyCODONE-acetaminophen 5-325 mg per tablet

## 2017-07-26 NOTE — ANESTHESIA PREPROCEDURE EVALUATION
Anesthetic History     Other anesthesia complications     Comments: Prolonged awakening once - was told it might be due to fentanyl. Review of Systems / Medical History  Patient summary reviewed, nursing notes reviewed and pertinent labs reviewed    Pulmonary        Sleep apnea: CPAP           Neuro/Psych   Within defined limits           Cardiovascular    Hypertension: well controlled                   GI/Hepatic/Renal     GERD: well controlled          Comments: Has not needed meds in years.  Endo/Other    Diabetes: type 2    Arthritis     Other Findings              Physical Exam    Airway  Mallampati: I  TM Distance: 4 - 6 cm  Neck ROM: normal range of motion   Mouth opening: Normal     Cardiovascular    Rhythm: regular  Rate: normal         Dental  No notable dental hx       Pulmonary  Breath sounds clear to auscultation               Abdominal  GI exam deferred       Other Findings            Anesthetic Plan    ASA: 3  Anesthesia type: general          Induction: Intravenous  Anesthetic plan and risks discussed with: Patient

## 2017-07-26 NOTE — PERIOP NOTES
Verbal hand off at the bedside with DIPTI Omer provided opportunity for questions. Patient states no family members here at this time. CPAP machine was brought with the patient to room 226.

## 2017-07-26 NOTE — IP AVS SNAPSHOT
[address] [address] 
[phone] Patient: Elsa Mail MRN: [id number] POS:4[birth year]5/[birth year] You are allergic to the following Allergen Reactions Norvasc (Amlodipine) Cough Tolectin (Tolmetin) Hives Recent Documentation Height Weight BMI Smoking Status 1.778 m 95.5 kg 30.2 kg/m2 Former Smoker Emergency Contacts Name Discharge Info Relation Home Work Mobile R Jamaal Ramírez 16 CAREGIVER [3] Son [22] [phone] Meli Dukes  Daughter [21]   [phone] About your hospitalization You were admitted on:  July 26, 2017 You last received care in the:  [facility] [address] You were discharged on:  July 27, 2017 Unit phone number:  [phone] Why you were hospitalized Your primary diagnosis was:  Not on File Your diagnoses also included:  Lumbar Stenosis With Neurogenic Claudication Providers Seen During Your Hospitalizations Provider Role Specialty Primary office phone Jackie King MD Attending Provider Neurosurgery [phone] Your Primary Care Physician (PCP) Primary Care Physician Office Phone Office Fax Willimeagan Hoover [phone] Follow-up Information Follow up With Details Comments Contact Info Jackie King MD On 8/18/2017 Follow up appointment @ 9:45am [address] [place] [address] 
[phone] Valeria De La Fuente MD   [address] [facility] [address] [place] [address] 
[phone] Current Discharge Medication List  
  
CONTINUE these medications which have NOT CHANGED Dose & Instructions Dispensing Information Comments Morning Noon Evening Bedtime  
 allopurinol 100 mg tablet Commonly known as:  Nishi Kraft Your last dose was: Your next dose is:    
   
   
 Dose:  100 mg Take 100 mg by mouth two (2) times a day. Refills:  0  
     
   
   
   
  
 aspirin delayed-release 81 mg tablet Your last dose was: Your next dose is: Take  by mouth daily. Refills:  0 CIALIS 5 mg tablet Generic drug:  tadalafil Your last dose was: Your next dose is:    
   
   
 Dose:  5 mg Take 5 mg by mouth as needed. Refills:  0 EXFORGE  mg per tablet Generic drug:  amLODIPine-valsartan Your last dose was: Your next dose is:    
   
   
 Dose:  1 Tab Take 1 Tab by mouth daily. Refills:  0 FORTESTA 10 mg/0.5 gram /actuation Glpm  
Generic drug:  testosterone Your last dose was: Your next dose is:    
   
   
 by TransDERmal route daily. Indications: 1 pump each thigh daily in am 10 mg Refills:  0  
     
   
   
   
  
 furosemide 20 mg tablet Commonly known as:  LASIX Your last dose was: Your next dose is:    
   
   
 Dose:  20 mg Take 20 mg by mouth as needed. Indications: Edema Refills:  0  
     
   
   
   
  
 glyBURIDE-metFORMIN 2.5-500 mg per tablet Commonly known as:  Redge Cheeks Your last dose was: Your next dose is:    
   
   
 Dose:  1 Tab Take 1 Tab by mouth as needed. For Glucose >150 in the evening Refills:  0 JANUVIA 100 mg tablet Generic drug:  SITagliptin Your last dose was: Your next dose is:    
   
   
 Dose:  100 mg Take 100 mg by mouth daily. Refills:  0 LIPITOR 10 mg tablet Generic drug:  atorvastatin Your last dose was: Your next dose is:    
   
   
 Dose:  10 mg Take 10 mg by mouth nightly. Refills:  0  
     
   
   
   
  
 melatonin Tab tablet Your last dose was: Your next dose is:    
   
   
 Dose:  5 mg Take 5 mg by mouth nightly. Refills:  0  
     
   
   
   
  
 metoprolol succinate 100 mg tablet Commonly known as:  TOPROL-XL Your last dose was: Your next dose is:    
   
   
 Dose:  100 mg Take 100 mg by mouth daily. Take with sip of water dos   Indications: HYPERTENSION Refills:  0 MICARDIS HCT 80-12.5 mg per tablet Generic drug:  telmisartan-hydroCHLOROthiazide Your last dose was: Your next dose is:    
   
   
 Dose:  1 Tab Take 1 Tab by mouth daily. Take with sip of water dos  Indications: HYPERTENSION Refills:  0 MIRALAX 17 gram/dose powder Generic drug:  polyethylene glycol Your last dose was: Your next dose is:    
   
   
 Dose:  17 g Take 17 g by mouth daily as needed. Refills:  0  
     
   
   
   
  
 multivitamin tablet Commonly known as:  ONE A DAY Your last dose was: Your next dose is:    
   
   
 Dose:  1 Tab Take 1 Tab by mouth daily. Refills:  0  
     
   
   
   
  
 oxyCODONE-acetaminophen 5-325 mg per tablet Commonly known as:  PERCOCET Your last dose was: Your next dose is:    
   
   
 Dose:  1 Tab Take 1 Tab by mouth every four (4) hours as needed for Pain. Max Daily Amount: 6 Tabs. Quantity:  40 Tab Refills:  0  
     
   
   
   
  
 potassium chloride SR 8 mEq tablet Commonly known as:  KLOR-CON 8 Your last dose was: Your next dose is:    
   
   
 Dose:  8 mEq Take 8 mEq by mouth daily as needed. Indications: HYPOKALEMIA PREVENTION Refills:  0 REFRESH LIQUIGEL 1 % Dlgl Generic drug:  carboxymethylcellulose sodium Your last dose was: Your next dose is:    
   
   
 Apply  to eye two (2) times a day. Indications: DRY EYE Refills:  0 Where to Get Your Medications Information on where to get these meds will be given to you by the nurse or doctor. ! Ask your nurse or doctor about these medications oxyCODONE-acetaminophen 5-325 mg per tablet Discharge Instructions Lumbar Laminectomy: What to Expect at [facility] Your Recovery You can expect your back to feel stiff or sore after surgery. This should improve in the weeks after surgery. You may have trouble sitting or standing in one position for very long and may need pain medicine in the weeks after your surgery. Your doctor may advise you to work with a physical therapist to strengthen the muscles around your spine and trunk. You will need to learn how to lift, twist, and bend so that you do not put too much strain on your back. This care sheet gives you a general idea about how long it will take for you to recover. But each person recovers at a different pace. Follow the steps below to get better as quickly as possible. How can you care for yourself at home? Activity · Rest when you feel tired. Getting enough sleep will help you recover. · Try to walk each day. Start by walking a little more than you did the day before. Bit by bit, increase the amount you walk. Walking boosts blood flow and helps prevent pneumonia and constipation. Walking may also decrease your muscle soreness after surgery. · If advised by your doctor, you may need to avoid lifting anything that would cause excessive strain on your back. This may include a child, heavy grocery bags and milk containers, a heavy briefcase or backpack, cat litter or dog food bags, or a vacuum . · Avoid strenuous activities, such as bicycle riding, jogging, weight lifting, or aerobic exercise, until your doctor says it is okay. · Do not drive for 2 to 4 weeks after your surgery or until your doctor says it is okay. · Avoid riding in a car for more than 30 minutes at a time for 2 to 4 weeks after surgery. If you must ride in a car for a longer distance, stop often to walk and stretch your legs.  
· Try to change your position about every 30 minutes while sitting or standing. This will help decrease your back pain while you are healing. · You will probably need to take 4 to 6 weeks off from work. It depends on the type of work you do and how you feel. · You may have sex as soon as you feel able, but avoid positions that put stress on your back or cause pain. Diet · You can eat your normal diet. If your stomach is upset, try bland, low-fat foods like plain rice, broiled chicken, toast, and yogurt. · Drink plenty of fluids (unless your doctor tells you not to). · You may notice that your bowel movements are not regular right after your surgery. This is common. Try to avoid constipation and straining with bowel movements. You may want to take a fiber supplement every day. If you have not had a bowel movement after a couple of days, ask your doctor about taking a mild laxative. Medicines · Your doctor will tell you if and when you can restart your medicines. He or she will also give you instructions about taking any new medicines. · If you take blood thinners, such as warfarin (Coumadin), clopidogrel (Plavix), or aspirin, be sure to talk to your doctor. He or she will tell you if and when to start taking those medicines again. Make sure that you understand exactly what your doctor wants you to do. · Take pain medicines exactly as directed. ¨ If the doctor gave you a prescription medicine for pain, take it as prescribed. ¨ If you are not taking a prescription pain medicine, ask your doctor if you can take an over-the-counter medicine. · If your doctor prescribed antibiotics, take them as directed. Do not stop taking them just because you feel better. You need to take the full course of antibiotics. · If you think your pain medicine is making you sick to your stomach: 
¨ Take your medicine after meals (unless your doctor has told you not to). ¨ Ask your doctor for a different pain medicine. Incision care · If you have strips of tape on the cut (incision) the doctor made, leave the tape on for a week or until it falls off. · Wash the area daily with warm, soapy water and pat it dry. · Keep the area clean and dry. You may cover it with a gauze bandage if it weeps or rubs against clothing. Change the bandage every day. Exercise · Do back exercises as instructed by your doctor. · Your doctor may advise you to work with a physical therapist to improve the strength and flexibility of your back. Other instructions · To reduce stiffness and help sore muscles, use a warm water bottle, a heating pad set on low, or a warm cloth on your back. Do not put heat right over the incision. Do not go to sleep with a heating pad on your skin. Follow-up care is a key part of your treatment and safety. Be sure to make and go to all appointments, and call your doctor if you are having problems. It's also a good idea to know your test results and keep a list of the medicines you take. When should you call for help? Call 911 anytime you think you may need emergency care. For example, call if: 
· You passed out (lost consciousness). · You have sudden chest pain and shortness of breath, or you cough up blood. · You are unable to move a leg at all. Call your doctor now or seek immediate medical care if: 
· You have new or worse symptoms in your legs or buttocks. Symptoms may include: ¨ Numbness or tingling. ¨ Weakness. ¨ Pain. · You lose bladder or bowel control. · You have loose stitches, or your incision comes open. · You have blood or fluid draining from the incision. · You have signs of infection, such as: 
¨ Increased pain, swelling, warmth, or redness. ¨ Pus draining from the incision. ¨ A fever. ¨ Red streaks leading from the incision. Watch closely for changes in your health, and be sure to contact your doctor if: 
· You do not have a bowel movement after taking a laxative. · You are not getting better as expected. Where can you learn more? Go to http://chicho-annabella.info/. Enter G737 in the search box to learn more about \"Lumbar Laminectomy: What to Expect at Home. \" Current as of: March 21, 2017 Content Version: 11.3 © 0821-8206 [employer]. Care instructions adapted under license by [employer] (which disclaims liability or warranty for this information). If you have questions about a medical condition or this instruction, always ask your healthcare professional. [address] any warranty or liability for your use of this information. DISCHARGE SUMMARY from Nurse The following personal items are in your possession at time of discharge: 
 
Dental Appliances: None Visual Aid: Glasses, With patient, At bedside Home Medications: None Jewelry: None Clothing: Pants, Shorts, Shirt, Undergarments, Footwear (LOcker # 1) Other Valuables: Eyeglasses (with pt) Personal Items Sent to [employer] Monitor: [id number]  in safe PATIENT INSTRUCTIONS: 
 
After general anesthesia or intravenous sedation, for 24 hours or while taking prescription Narcotics: · Limit your activities · Do not drive and operate hazardous machinery · Do not make important personal or business decisions · Do  not drink alcoholic beverages · If you have not urinated within 8 hours after discharge, please contact your surgeon on call. Report the following to your surgeon: 
· Excessive pain, swelling, redness or odor of or around the surgical area · Temperature over 100.5 · Nausea and vomiting lasting longer than 4 hours or if unable to take medications · Any signs of decreased circulation or nerve impairment to extremity: change in color, persistent  numbness, tingling, coldness or increase pain · Any questions What to do at Home: 
Recommended activity: Activity as tolerated,  
 
 
 
 
 *  Please give a list of your current medications to your Primary Care Provider. *  Please update this list whenever your medications are discontinued, doses are 
    changed, or new medications (including over-the-counter products) are added. *  Please carry medication information at all times in case of emergency situations. These are general instructions for a healthy lifestyle: No smoking/ No tobacco products/ Avoid exposure to second hand smoke Surgeon General's Warning:  Quitting smoking now greatly reduces serious risk to your health. Obesity, smoking, and sedentary lifestyle greatly increases your risk for illness A healthy diet, regular physical exercise & weight monitoring are important for maintaining a healthy lifestyle You may be retaining fluid if you have a history of heart failure or if you experience any of the following symptoms:  Weight gain of 3 pounds or more overnight or 5 pounds in a week, increased swelling in our hands or feet or shortness of breath while lying flat in bed. Please call your doctor as soon as you notice any of these symptoms; do not wait until your next office visit. Recognize signs and symptoms of STROKE: 
 
F-face looks uneven A-arms unable to move or move unevenly S-speech slurred or non-existent T-time-call 911 as soon as signs and symptoms begin-DO NOT go Back to bed or wait to see if you get better-TIME IS BRAIN. Warning Signs of HEART ATTACK Call 911 if you have these symptoms: 
? Chest discomfort. Most heart attacks involve discomfort in the center of the chest that lasts more than a few minutes, or that goes away and comes back. It can feel like uncomfortable pressure, squeezing, fullness, or pain. ? Discomfort in other areas of the upper body. Symptoms can include pain or discomfort in one or both arms, the back, neck, jaw, or stomach. ? Shortness of breath with or without chest discomfort. ? Other signs may include breaking out in a cold sweat, nausea, or lightheadedness. Don't wait more than five minutes to call [address]! Fast action can save your life. Calling 911 is almost always the fastest way to get lifesaving treatment. Emergency Medical Services staff can begin treatment when they arrive  up to an hour sooner than if someone gets to the hospital by car. The discharge information has been reviewed with the patient. The patient verbalized understanding. Discharge medications reviewed with the patient and appropriate educational materials and side effects teaching were provided. Discharge Orders None [employer] Announcement We are excited to announce that we are making your provider's discharge notes available to you in [employer]. You will see these notes when they are completed and signed by the physician that discharged you from your recent hospital stay. If you have any questions or concerns about any information you see in [employer], please call the Health Information Department where you were seen or reach out to your Primary Care Provider for more information about your plan of care. Introducing [facility] SERVICES! Gris Lamar introduces [employer] patient portal. Now you can access parts of your medical record, email your doctor's office, and request medication refills online. 1. In your internet browser, go to [email or web address] Prept 2. Click on the First Time User? Click Here link in the Sign In box. You will see the New Member Sign Up page. 3. Enter your [employer] Access Code exactly as it appears below. You will not need to use this code after youve completed the sign-up process. If you do not sign up before the expiration date, you must request a new code. · [employer] Access Code: [email or web address] Expires: 10/18/2017 12:47 PM 
 
4.  Enter the last four digits of your Social Security Number (xxxx) and Date of Birth (mm/dd/yyyy) as indicated and click Submit. You will be taken to the next sign-up page. 5. Create a [employer] ID. This will be your [employer] login ID and cannot be changed, so think of one that is secure and easy to remember. 6. Create a [employer] password. You can change your password at any time. 7. Enter your Password Reset Question and Answer. This can be used at a later time if you forget your password. 8. Enter your e-mail address. You will receive e-mail notification when new information is available in [address] 9. Click Sign Up. You can now view and download portions of your medical record. 10. Click the Download Summary menu link to download a portable copy of your medical information. If you have questions, please visit the Frequently Asked Questions section of the [employer] website. Remember, [employer] is NOT to be used for urgent needs. For medical emergencies, dial 911. Now available from your iPhone and Android! General Information Please provide this summary of care documentation to your next provider. Patient Signature:  ____________________________________________________________ Date:  ____________________________________________________________  
  
[facility] Provider Signature:  ____________________________________________________________ Date:  ____________________________________________________________

## 2017-07-26 NOTE — PROGRESS NOTES
1102 - Patient arrives to unit at this time. Admission completed at this time. Patient is A/O x ***. IV to ***  intact and patent. *** and *** applied ***.  *** dressing to *** CDI. *** numbness/tingling. Pain ***/10. Patient was oriented to the room to include use of call bell, meal ordering, and use of incentive spirometer. Patient was given explanation of \" up for dinner\" program and has verbalized understanding. Phone and call bell left within reach. Plan of care for the day addressed with patient. Educated on pain medication availability and possible side effects.

## 2017-07-26 NOTE — PERIOP NOTES
Pt states he came to hospital alone, checked PACU and downstairs waiting rooms/no family and called phone number in chart/no answer.  Will ask pt again when more awake

## 2017-07-26 NOTE — ANESTHESIA POSTPROCEDURE EVALUATION
Post-Anesthesia Evaluation & Assessment    Visit Vitals    /67    Pulse 63    Temp 36.7 °C (98 °F)    Resp 13    Ht 5' 10\" (1.778 m)    Wt 95.5 kg (210 lb 8 oz)    SpO2 95%    BMI 30.2 kg/m2       Nausea/Vomiting: no nausea    Post-operative hydration adequate.     Pain score (VAS): 0    Mental status & Level of consciousness: alert and oriented x 3    Neurological status: moves all extremities, sensation grossly intact    Pulmonary status: airway patent, no supplemental oxygen required    Complications related to anesthesia: none    Additional comments:

## 2017-07-26 NOTE — OP NOTES
[address] [place] [address]  OPERATIVE REPORT    Name:  Kimberly Lucas  MR#:  [id number]  :  1940  Account #:  [de-identified]  Date of Adm:  2017  Date of Surgery:  2017      PREOPERATIVE DIAGNOSIS: Lumbar stenosis, L5-S1, right side,  with radiculopathy. POSTOPERATIVE DIAGNOSIS: Lumbar stenosis, L5-S1, right side,  with radiculopathy. PROCEDURES PERFORMED: Lumbar partial hemilaminectomy L5-  S1 right side, medial facetectomy, foraminotomy, lysis of scar tissue  with microsurgical technique. SURGEON: Lia Cowart MD.    ANESTHESIA: General, Dr. Yulissa Foss. ESTIMATED BLOOD LOSS: Negligible. COUNTS: All counts were correct. SPECIMENS REMOVED: none    HISTORY: The patient was brought to surgery with intractable right-  sided sciatic leg pain. He had previous imaging and studies revealed  stenosis on the right at L5-S1. Before surgery, a full discussion with the  patient regarding the operation, risk, reasonable outcome and  expectations of surgery, increased risk of damage to the nerve root,  thecal sac, or spinal fluid leak because of scar tissue. He understood  all the above. DESCRIPTION OF PROCEDURE: The patient was placed on the  operating table in the prone position for laminectomy after satisfactory  induction of general endotracheal anesthesia. The back was prepped  and draped in a sterile field, given intravenously antibiotics. Image  intensifier draped in sterile field. X-rays were taken to verify the  anatomy and the lower part of the wound was opened. The paraspinal  musculature spine, right side, modified Venice retractor placed in  position. Took an x-ray to confirm the anatomy. Using the microscope,  I went down and found a lot of scar. I performed a generous  hemilaminotomy and medial facetectomy carried out more laterally. It took a long time to deal of scar tissues.  Subsequently, determined  the position of the nerve root and performed a thorough foraminotomy  and decompression. Diskectomy not necessary. When I finished, I  could pass a Hohmann down the neural foramen. The nerve was now  nice, free, and mobile. It was felt appropriate compressive pathology  had been found and dealt with as described. No spinal fluid was seen. No recurrent disk herniation found. The wound was irrigated with  antibiotic solution. Gelfoam soaked in Kenalog was placed over  exposed nerve root and dura, and the wound was closed in multiple  layers of Vicryl in the deep superficial fascia, 3-0 subcuticular suture of  Monocryl, Dermabond on the skin. Standard dressing applied. Anesthesia discontinued. The patient was transferred to PACU, having  tolerated the procedure well.         Barbar Huff, MD Wille Harada / Ruy Villela  D:  07/26/2017   09:39  T:  07/26/2017   16:34  Job #:  [id number]

## 2017-07-26 NOTE — PROGRESS NOTES
Admission Medication Reconciliation has been performed on this patient consisting of review of PTA Home Medication List in relation to patient's Allergies and PMH as well as formulary availability. Medication Reconciliation Interventions:   Wrong Medication Identified 0  Wrong/missing medication strength or dose identified  1  Wrong/missing Interval Identified 1  Wrong/missing Route Identified 0  Medication Duplication 0  Omissions 0  Commissions 0  Other Issue(s) Identified (Indicate): 0            Medication Compliance Issues and/or Medication Concerns: modified amlodipine admission order to reflect corrected home dose.     [address] [place] [address] Pharmacist  [phone]

## 2017-07-26 NOTE — PERIOP NOTES
Pt cpap is in black bag with blue biomed sticker [facility]  No family to update per pt/verbal

## 2017-07-26 NOTE — PERIOP NOTES
stated that he has reviewed PTA meds and not able to sarah as review ticket in to [address] [place] [address]. Colby RESENDEZ notified.  Pt used urinal voided approx 250 ml

## 2017-07-27 VITALS
TEMPERATURE: 98.5 F | HEART RATE: 77 BPM | OXYGEN SATURATION: 95 % | WEIGHT: 210.5 LBS | SYSTOLIC BLOOD PRESSURE: 134 MMHG | RESPIRATION RATE: 17 BRPM | DIASTOLIC BLOOD PRESSURE: 78 MMHG | BODY MASS INDEX: 30.14 KG/M2 | HEIGHT: 70 IN

## 2017-07-27 PROBLEM — M48.061 LUMBAR STENOSIS: Status: ACTIVE | Noted: 2017-07-27

## 2017-07-27 LAB
GLUCOSE BLD STRIP.AUTO-MCNC: 217 MG/DL (ref 70–110)
GLUCOSE BLD STRIP.AUTO-MCNC: 334 MG/DL (ref 70–110)
GLUCOSE BLD STRIP.AUTO-MCNC: 403 MG/DL (ref 70–110)

## 2017-07-27 PROCEDURE — 77030011256 HC DRSG MEPILEX <16IN NO BORD MOLN -A

## 2017-07-27 PROCEDURE — 74011636637 HC RX REV CODE- 636/637: Performed by: SPECIALIST

## 2017-07-27 PROCEDURE — G8980 MOBILITY D/C STATUS: HCPCS

## 2017-07-27 PROCEDURE — 82962 GLUCOSE BLOOD TEST: CPT

## 2017-07-27 PROCEDURE — 97116 GAIT TRAINING THERAPY: CPT

## 2017-07-27 PROCEDURE — 74011250636 HC RX REV CODE- 250/636: Performed by: SPECIALIST

## 2017-07-27 PROCEDURE — G8979 MOBILITY GOAL STATUS: HCPCS

## 2017-07-27 PROCEDURE — 74011250637 HC RX REV CODE- 250/637: Performed by: SPECIALIST

## 2017-07-27 RX ORDER — OXYCODONE AND ACETAMINOPHEN 5; 325 MG/1; MG/1
1 TABLET ORAL
Qty: 40 TAB | Refills: 0 | Status: SHIPPED | OUTPATIENT
Start: 2017-07-27 | End: 2019-05-04

## 2017-07-27 RX ORDER — INSULIN LISPRO 100 [IU]/ML
INJECTION, SOLUTION INTRAVENOUS; SUBCUTANEOUS
Status: DISCONTINUED | OUTPATIENT
Start: 2017-07-27 | End: 2017-07-27 | Stop reason: HOSPADM

## 2017-07-27 RX ADMIN — CARBOXYMETHYLCELLULOSE SODIUM 1 DROP: 5 SOLUTION/ DROPS OPHTHALMIC at 09:15

## 2017-07-27 RX ADMIN — SODIUM CHLORIDE, SODIUM LACTATE, POTASSIUM CHLORIDE, AND CALCIUM CHLORIDE 75 ML/HR: 600; 310; 30; 20 INJECTION, SOLUTION INTRAVENOUS at 05:46

## 2017-07-27 RX ADMIN — SITAGLIPTIN 100 MG: 100 TABLET, FILM COATED ORAL at 09:15

## 2017-07-27 RX ADMIN — DEXAMETHASONE SODIUM PHOSPHATE 4 MG: 4 INJECTION, SOLUTION INTRAMUSCULAR; INTRAVENOUS at 01:01

## 2017-07-27 RX ADMIN — HYDROCHLOROTHIAZIDE 12.5 MG: 25 TABLET ORAL at 09:13

## 2017-07-27 RX ADMIN — METOPROLOL SUCCINATE 100 MG: 100 TABLET, EXTENDED RELEASE ORAL at 09:13

## 2017-07-27 RX ADMIN — OXYCODONE HYDROCHLORIDE AND ACETAMINOPHEN 1 TABLET: 5; 325 TABLET ORAL at 06:30

## 2017-07-27 RX ADMIN — CEFAZOLIN SODIUM 2 G: 2 SOLUTION INTRAVENOUS at 07:09

## 2017-07-27 RX ADMIN — VALSARTAN 160 MG: 160 TABLET ORAL at 09:15

## 2017-07-27 RX ADMIN — POTASSIUM CHLORIDE 10 MEQ: 10 TABLET, EXTENDED RELEASE ORAL at 09:13

## 2017-07-27 RX ADMIN — ALLOPURINOL 100 MG: 100 TABLET ORAL at 09:15

## 2017-07-27 RX ADMIN — AMLODIPINE BESYLATE 10 MG: 5 TABLET ORAL at 09:15

## 2017-07-27 RX ADMIN — SENNOSIDES 8.6 MG: 8.6 TABLET, FILM COATED ORAL at 09:15

## 2017-07-27 RX ADMIN — INSULIN LISPRO 6 UNITS: 100 INJECTION, SOLUTION INTRAVENOUS; SUBCUTANEOUS at 07:53

## 2017-07-27 RX ADMIN — TELMISARTAN 80 MG: 80 TABLET ORAL at 09:15

## 2017-07-27 NOTE — DISCHARGE SUMMARY
Discharge Summary     PATIENT ID:  Izabella Stauffer  68 y.o.  1940    PHYSICIAN:  Jamal Blake MD, MD .  ADMIT DATE: 7/26/2017   DISCHARGE DATE:  7-27-17      DISCHARGE DIAGNOSIS:  Lumbar stenosis with neurogenic claudication    OPERATIVE PROCEDURES:  Lumbar laminectomy L5-S1 right        HOSPITAL COURSE:  Tolerated the operative procedure well and was taken to PACU and then to the floor. As of d/c the patient is ambulating, tolerating p.o., and voiding without difficulty. PHYSICAL EXAM:  Visit Vitals    /78    Pulse 77    Temp 98.5 °F (36.9 °C)    Resp 17    Ht 5' 10\" (1.778 m)    Wt 95.5 kg (210 lb 8 oz)    SpO2 95%    BMI 30.2 kg/m2     Alert and appropriate. Motor and sensory function are grossly intact. The wound is clean/dry/intact and flat. RELAVENT LABS (within last 72 hrs):    Cbc, chem profile      CONDITION AT DISCHARGE:   Neurologically stable, ambulating, taking PO medications. Current Discharge Medication List      CONTINUE these medications which have CHANGED    Details   oxyCODONE-acetaminophen (PERCOCET) 5-325 mg per tablet Take 1 Tab by mouth every four (4) hours as needed for Pain. Max Daily Amount: 6 Tabs. Qty: 40 Tab, Refills: 0         CONTINUE these medications which have NOT CHANGED    Details   amLODIPine-valsartan (EXFORGE)  mg per tablet Take 1 Tab by mouth daily. carboxymethylcellulose sodium (REFRESH LIQUIGEL) 1 % dlgl Apply  to eye two (2) times a day. Indications: DRY EYE      melatonin tab tablet Take 5 mg by mouth nightly. aspirin delayed-release 81 mg tablet Take  by mouth daily. tadalafil (CIALIS) 5 mg tablet Take 5 mg by mouth as needed. metoprolol succinate (TOPROL-XL) 100 mg XL tablet Take 100 mg by mouth daily. Take with sip of water dos   Indications: HYPERTENSION      telmisartan-hydrochlorothiazide (MICARDIS HCT) 80-12.5 mg per tablet Take 1 Tab by mouth daily.  Take with sip of water dos  Indications: HYPERTENSION      sitaGLIPtin (JANUVIA) 100 mg tablet Take 100 mg by mouth daily. potassium chloride SR (KLOR-CON 8) 8 mEq tablet Take 8 mEq by mouth daily as needed. Indications: HYPOKALEMIA PREVENTION      allopurinol (ZYLOPRIM) 100 mg tablet Take 100 mg by mouth two (2) times a day. glyBURIDE-metFORMIN (GLUCOVANCE) 2.5-500 mg per tablet Take 1 Tab by mouth as needed. For Glucose >150 in the evening      polyethylene glycol (MIRALAX) 17 gram/dose powder Take 17 g by mouth daily as needed. multivitamin (ONE A DAY) tablet Take 1 Tab by mouth daily. testosterone (FORTESTA) 10 mg/0.5 gram /actuation glpm by TransDERmal route daily. Indications: 1 pump each thigh daily in am 10 mg      atorvastatin (LIPITOR) 10 mg tablet Take 10 mg by mouth nightly. furosemide (LASIX) 20 mg tablet Take 20 mg by mouth as needed. Indications: Edema             DISPOSITION:  Home   -F/u in 3 weeks (the patient should call [phone] for the appointment)   -Activity instructions have been given in the office and by nursing.     CONSULTANTS:  [unfilled]        PCP:  Maximino Naylor MD, MD    DISCHARGING PHYSICIAN: Crystal Mahoney MD, MD

## 2017-07-27 NOTE — PROGRESS NOTES
Chart reviewed and pt declines all services at this time. CM remains available for assistance . Care Management Interventions  PCP Verified by CM: Yes  Mode of Transport at Discharge:  Other (see comment)  Transition of Care Consult (CM Consult): Discharge Planning  Physical Therapy Consult: Yes  Occupational Therapy Consult: Yes  Current Support Network: Family Lives Nearby  Confirm Follow Up Transport: Family  Plan discussed with Pt/Family/Caregiver: Yes  Freedom of Choice Offered: Yes  Discharge Location  Discharge Placement: Home

## 2017-07-27 NOTE — PROGRESS NOTES
Problem: Mobility Impaired (Adult and Pediatric)  Goal: *Acute Goals and Plan of Care (Insert Text)  In 1-7 days pt will be able to perform:  STG  1. Bed mobility: Demonstrate proper log-roll technique for safe initiation of rolling for OOB activities. 2. Supine to sit to supine S with HR for meals. 3. Sit to stand to sit S with RW in prep for ambulation. LT. Gait: Ambulate 150ft S with RW, for home/community mobility. 2. Stair Negotiation: Ascend/descend >3 steps CGA with HR for home entry. 3. Activity tolerance: Tolerate up in chair 30 minutes-1 hour for ADLs. 4. Patient/Family Education: Patient/family to be independent with HEP for follow-up care and safe discharge. Outcome: Resolved/Met Date Met:  17  PHYSICAL THERAPY TREATMENT/DISCHARGE     Patient: Cristina Verdugo (62 y.o. male)  Date: 2017  Diagnosis: LUMBAR STENOSIS  Lumbar stenosis with neurogenic claudication  Lumbar stenosis  Lumbar stenosis with neurogenic claudication <principal problem not specified>  Procedure(s) (LRB):  LUMBAR LAMINECTOMY (L5-S1) RIGHT W/C-ARM - SPEC POP (Right) 1 Day Post-Op  Precautions: Fall, Back  Chart, physical therapy assessment, plan of care and goals were reviewed. ASSESSMENT:  Pt has met all acute care PT goals at this time for return to home with recommendation for HHPT. During gait training pt ambulated [facility] distances with RW use, supervision level assist demonstrating an antalgic gait pattern with decreased step clearance and verbal cuing to keep RW closer to JAMIE. Pt ascended and descended a full flight of stairs with single HR use, CGA/supervision level assist with a reciprocal pattern. Left pt sitting up in a chair with all needs met, DIPTI Bernardo notified pt anxious about leaving soon. Recommend HHPT at time of discharge. Educated pt on the importance of HEP, home ambulation and home safety due to increased falls risk; pt verbalized understanding.   Progression toward goals:  [X] Goals met  [ ]      Improving appropriately and progressing toward goals  [ ]      Improving slowly and progressing toward goals  [ ]      Not making progress toward goals and plan of care will be adjusted       PLAN:  Patient will be discharged from physical therapy at this time. Rationale for discharge:  [X] Goals Achieved  [ ] Jo Cote  [ ] Patient not participating in therapy  [ ] Other:  Discharge Recommendations:  Home Health  Further Equipment Recommendations for Discharge:  rolling walker       SUBJECTIVE:   Patient stated I am waiting for you so I can go home.       OBJECTIVE DATA SUMMARY:   Critical Behavior:  Neurologic State: Alert, Appropriate for age  Orientation Level: Appropriate for age, Oriented X4  Cognition: Appropriate decision making, Appropriate for age attention/concentration, Appropriate safety awareness, Follows commands  Safety/Judgement: Awareness of environment  Functional Mobility Training:  Bed Mobility:  Rolling: Supervision  Supine to Sit: Supervision  Sit to Supine: Supervision  Scooting: Supervision   Transfers:  Sit to Stand: Supervision  Stand to Sit: Supervision  Balance:  Sitting: Intact  Standing: Intact; With support  Ambulation/Gait Training:  Distance (ft): 224 Feet (ft)  Assistive Device: Walker, rolling;Gait belt  Ambulation - Level of Assistance: Supervision   Gait Abnormalities: Antalgic;Decreased step clearance   Base of Support: Widened  Stance: Weight shift  Speed/Leonarda: Pace decreased (<100 feet/min)  Step Length: Right shortened;Left shortened  Swing Pattern: Left asymmetrical;Right asymmetrical   Interventions: Verbal cues; Safety awareness training   Therapeutic Exercises:   HEP included ankle pumps, LAQ x 5 reps  Pain:  Pain Scale 1: Numeric (0 - 10)  Pain Intensity 1: 5  Pain Location 1: Back  Pain Orientation 1: Lower;Mid  Pain Description 1: Aching  Pain Intervention(s) 1: Medication (see MAR)  Activity Tolerance:   Good  Please refer to the flowsheet for vital signs taken during this treatment. After treatment:   [X] Patient left in no apparent distress sitting up in chair  [ ] Patient left in no apparent distress in bed  [X] Call bell left within reach  [X] Nursing notified  [X] Caregiver present  [ ] Bed alarm activated        Payton Childers PT, DPT      Time Calculation: 13 mins    G-Codes (GP)  Mobility    Goal  CI= 1-19%  D/C  CI= 1-19%      The severity rating is based on the Other Level of assistance with mobility.

## 2017-07-27 NOTE — PROGRESS NOTES
19:15 Assessment completed. O2 remains @ 2 LPM per NC, Lungs are clear bilat but decreased in the bases. Back dsg remains C/D/I with + CMS & + PP. Dorsi/plantar flexion & extension remains = & strong. Denies numbness or tingling in LE's. Resting quietly in bed with TV on & talking on the phone. Voiding per urinal w/o difficulty. 23:00 Shift assessment completed. See nsg flow sheet for details. 03:00 Reassessed with 0 changes noted. Back dsg remains C/D/I with + CMS & + PP. Continues to deny numbness or tingling in LE's. Dorsi/plantar flexion & extension remains = & strong. Resting quietly in bed with eyes closed between cares x for voiding per urinal & BR w/o difficulty. Steady on ft with walker. 07:35 Bedside and Verbal shift change report given to Sandra Ocampo RN (oncoming nurse) by Gail Collins RN (offgoing nurse). Report included the following information SBAR.

## 2017-07-27 NOTE — PROGRESS NOTES
Problem: Mobility Impaired (Adult and Pediatric)  Goal: *Acute Goals and Plan of Care (Insert Text)  In 1-7 days pt will be able to perform:  STG  1. Bed mobility: Demonstrate proper log-roll technique for safe initiation of rolling for OOB activities. 2. Supine to sit to supine S with HR for meals. 3. Sit to stand to sit S with RW in prep for ambulation. LT. Gait: Ambulate 150ft S with RW, for home/community mobility. 2. Stair Negotiation: Ascend/descend >3 steps CGA with HR for home entry. 3. Activity tolerance: Tolerate up in chair 30 minutes-1 hour for ADLs. 4. Patient/Family Education: Patient/family to be independent with HEP for follow-up care and safe discharge. PHYSICAL THERAPY EVALUATION     Patient: Brigette Simeon (37 y.o. male)  Date: 2017  Primary Diagnosis: LUMBAR STENOSIS  Lumbar stenosis with neurogenic claudication  Procedure(s) (LRB):  LUMBAR LAMINECTOMY (L5-S1) RIGHT W/C-ARM - SPEC POP (Right) Day of Surgery   Precautions:   Fall, Back      ASSESSMENT :  Based on the objective data described below, the patient presents with decreased functional mobility and independence in regard to bed mobility, transfers, gt quality and tolerance, generalized strength, pain, stair negotiation and safety due to recent back surgery. Pt rating pain in back 1/10 on numerical pain scale. Pt ed regarding back precautions and log roll techniques. Pt required additional time for bed mobility and transition w/ supine <>SL<>sit. Sit to stand and stand to sit min A. Pt able to participate in gt training w/ RW, WBAT, GB and CGA in hallway w/ antalgic pattern and vc for staying close RW. Pt able to stand and void in bathroom. Pt returned to supine in bed w/ all needs within reach, SCDs applied. Nurse Kan murray. [facility] hospital d/c. Patient will benefit from skilled intervention to address the above impairments.   Patients rehabilitation potential is considered to be Good  Factors which may influence rehabilitation potential include:   [ ]         None noted  [ ]         Mental ability/status  [ ]         Medical condition  [ ]         Home/family situation and support systems  [ ]         Safety awareness  [X]         Pain tolerance/management  [ ]         Other:        PLAN :  Recommendations and Planned Interventions:  [X]           Bed Mobility Training             [ ]    Neuromuscular Re-Education  [X]           Transfer Training                   [ ]    Orthotic/Prosthetic Training  [X]           Gait Training                          [ ]    Modalities  [X]           Therapeutic Exercises          [ ]    Edema Management/Control  [X]           Therapeutic Activities            [X]    Patient and Family Training/Education  [ ]           Other (comment):     Frequency/Duration: Patient will be followed by physical therapy twice daily to address goals. Discharge Recommendations: Home Health  Further Equipment Recommendations for Discharge: N/A       SUBJECTIVE:   Patient stated I want to get up.       OBJECTIVE DATA SUMMARY:       Past Medical History:   Diagnosis Date    Adverse effect of anesthesia       took long time coming out with back surgery done at [facility]. Thinks it was related to Fentanyl    Arthritis      Diabetes ([facility] [address] 1982     type 2    Diverticulitis      Gastrointestinal disorder       diverticulitis    GERD (gastroesophageal reflux disease)       no meds now, was on Nexium previously    Gout      Hypertension      Other ill-defined conditions 2012     Diverticulitis, hospitalized x 4 days    Thromboembolus [facility])       behind left knee was on Xarelto stopped 8-1-15. On no anticoagulation now    Unspecified adverse effect of anesthesia       pt states he \"had trouble coming to, took too long\"    Unspecified sleep apnea       CPAP user.   Pt instructed to bring CPAP on DOS     Past Surgical History:   Procedure Laterality Date    COLONOSCOPY N/A 6/13/2016 COLONOSCOPY AND performed by Matt Zavala MD at [facility] ENDOSCOPY    [facility]   2011      ORIF right ankle   [place] SURGERY   2002 & 2005 ,2012    HX CARPAL TUNNEL RELEASE        HX COLECTOMY         part of colon removed due to diverticulitis    HX HERNIA REPAIR         left Ing hernia repair x3    HX ORTHOPAEDIC   2010     left    HX ORTHOPAEDIC   2012     Rem. hardware right ankle    HX RETINAL DETACHMENT REPAIR   1996     Barriers to Learning/Limitations: None  Compensate with: visual, verbal, tactile, kinesthetic cues/model  Prior Level of Function/Home Situation:   Home Situation  Home Environment: Other (comment) ([facility])  # Steps to Enter: 1  One/Two Story Residence: Two story  # of Interior Steps: 13  Interior Rails: Right  Living Alone: Yes  Support Systems: Friends \ neighbors  Patient Expects to be Discharged to[de-identified] Private residence  Current DME Used/Available at Home: nicole Sunshine Walker, emily  Critical Behavior:  Neurologic State: Alert; Appropriate for age  Orientation Level: Oriented X4  Cognition: Appropriate decision making; Appropriate for age attention/concentration; Appropriate safety awareness; Follows commands  Safety/Judgement: Awareness of environment  Psychosocial  Patient Behaviors: Calm; Cooperative  Skin Condition/Temp: Dry;Warm  Skin Integrity: Incision (comment) (back)  Skin Integumentary  Skin Color: Appropriate for ethnicity  Skin Condition/Temp: Dry;Warm  Skin Integrity: Incision (comment) (back)  Strength:    Strength: Generally decreased, functional  Tone & Sensation:   Tone: Abnormal  Sensation: Impaired (B feet)  Range Of Motion:  AROM: Generally decreased, functional  Functional Mobility:  Bed Mobility:  Rolling: Contact guard assistance; Additional time (vc)  Supine to Sit: Contact guard assistance;Minimum assistance; Additional time (vc)  Sit to Supine: Contact guard assistance; Additional time (vc)  Scooting: Contact guard assistance (vc)  Transfers:  Sit to Stand: Minimum assistance; Additional time (vc)  Stand to Sit: Minimum assistance; Additional time (vc)  Balance:   Sitting: Intact  Standing: Intact; With support  Ambulation/Gait Training:  Distance (ft): 130 Feet (ft)  Assistive Device: Walker, rolling;Gait belt  Ambulation - Level of Assistance: Contact guard assistance (vc)  Gait Abnormalities: Antalgic;Decreased step clearance (forward flex trunk)  Base of Support: Widened  Stance: Weight shift;Time  Speed/Leonarda: Slow  Step Length: Left shortened;Right shortened  Swing Pattern: Left asymmetrical;Right asymmetrical  Interventions: Safety awareness training;Verbal cues  Therapeutic Exercises:   Pain:  Pain Scale 1: Numeric (0 - 10)  Pain Intensity 1: 1  Pain Location 1: Back  Activity Tolerance:   Fair   Please refer to the flowsheet for vital signs taken during this treatment. After treatment:   [ ]         Patient left in no apparent distress sitting up in chair  [X]         Patient left in no apparent distress in bed  [X]         Call bell left within reach  [X]         Nursing notified  [ ]         Caregiver present  [ ]         Bed alarm activated      COMMUNICATION/EDUCATION:   [X]         Fall prevention education was provided and the patient/caregiver indicated understanding. [X]         Patient/family have participated as able in goal setting and plan of care. [X]         Patient/family agree to work toward stated goals and plan of care. [ ]         Patient understands intent and goals of therapy, but is neutral about his/her participation. [ ]         Patient is unable to participate in goal setting and plan of care.      Thank you for this referral.  Tiera Brenner, PT   Time Calculation: 34 mins  Eval Complexity: History: HIGH Complexity :3+ comorbidities / personal factors will impact the outcome/ POC Exam:LOW Complexity : 1-2 Standardized tests and measures addressing body structure, function, activity limitation and / or participation in recreation  Presentation: LOW Complexity : Stable, uncomplicated  Clinical Decision Making:Low Complexity amb >30' RW Overall Complexity:LOW

## 2017-07-27 NOTE — PROGRESS NOTES
conducted an initial consultation and Spiritual Assessment for Nichole Solis, who is a 68 y. o.,male. Patients Primary Language is: [place]. According to the patients EMR Orthodoxy Affiliation is: [facility].     The reason the Patient came to the hospital is:   Patient Active Problem List    Diagnosis Date Noted    Lumbar stenosis with neurogenic claudication 07/26/2017    HTN (hypertension), benign 09/02/2015    Diverticulosis of colon without diverticulitis 08/31/2015    Surgery, elective 08/31/2015    GI bleed 10/07/2012    Type II or unspecified type diabetes mellitus without mention of complication, not stated as uncontrolled 10/07/2012    Essential hypertension, malignant 10/07/2012        The  provided the following Interventions:  Initiated a relationship of care and support. Listened empathically. Provided chaplaincy education. Provided information about Spiritual Care Services. Offered assurance of continued prayers on patient's behalf. Chart reviewed. The following outcomes were achieved:  Patient expressed gratitude for the 's visit. Assessment:  Patient did not indicate any spiritual or Sabianist issues which require Spiritual Care Services interventions at this time. Patient does not have any Sabianist/cultural needs that will affect patients preferences in health care. Plan:  Chaplains will continue to follow and will provide pastoral care on an as needed or requested basis.  recommends bedside caregivers page  on duty if patient shows signs of acute spiritual or emotional distress. Sharon Graves Light Intern  [phone]

## 2017-07-27 NOTE — DISCHARGE INSTRUCTIONS
Lumbar Laminectomy: What to Expect at [address] can expect your back to feel stiff or sore after surgery. This should improve in the weeks after surgery. You may have trouble sitting or standing in one position for very long and may need pain medicine in the weeks after your surgery. Your doctor may advise you to work with a physical therapist to strengthen the muscles around your spine and trunk. You will need to learn how to lift, twist, and bend so that you do not put too much strain on your back. This care sheet gives you a general idea about how long it will take for you to recover. But each person recovers at a different pace. Follow the steps below to get better as quickly as possible. How can you care for yourself at home? Activity  · Rest when you feel tired. Getting enough sleep will help you recover. · Try to walk each day. Start by walking a little more than you did the day before. Bit by bit, increase the amount you walk. Walking boosts blood flow and helps prevent pneumonia and constipation. Walking may also decrease your muscle soreness after surgery. · If advised by your doctor, you may need to avoid lifting anything that would cause excessive strain on your back. This may include a child, heavy grocery bags and milk containers, a heavy briefcase or backpack, cat litter or dog food bags, or a vacuum . · Avoid strenuous activities, such as bicycle riding, jogging, weight lifting, or aerobic exercise, until your doctor says it is okay. · Do not drive for 2 to 4 weeks after your surgery or until your doctor says it is okay. · Avoid riding in a car for more than 30 minutes at a time for 2 to 4 weeks after surgery. If you must ride in a car for a longer distance, stop often to walk and stretch your legs. · Try to change your position about every 30 minutes while sitting or standing. This will help decrease your back pain while you are healing.   · You will probably need to take 4 to 6 weeks off from work. It depends on the type of work you do and how you feel. · You may have sex as soon as you feel able, but avoid positions that put stress on your back or cause pain. Diet  · You can eat your normal diet. If your stomach is upset, try bland, low-fat foods like plain rice, broiled chicken, toast, and yogurt. · Drink plenty of fluids (unless your doctor tells you not to). · You may notice that your bowel movements are not regular right after your surgery. This is common. Try to avoid constipation and straining with bowel movements. You may want to take a fiber supplement every day. If you have not had a bowel movement after a couple of days, ask your doctor about taking a mild laxative. Medicines  · Your doctor will tell you if and when you can restart your medicines. He or she will also give you instructions about taking any new medicines. · If you take blood thinners, such as warfarin (Coumadin), clopidogrel (Plavix), or aspirin, be sure to talk to your doctor. He or she will tell you if and when to start taking those medicines again. Make sure that you understand exactly what your doctor wants you to do. · Take pain medicines exactly as directed. ¨ If the doctor gave you a prescription medicine for pain, take it as prescribed. ¨ If you are not taking a prescription pain medicine, ask your doctor if you can take an over-the-counter medicine. · If your doctor prescribed antibiotics, take them as directed. Do not stop taking them just because you feel better. You need to take the full course of antibiotics. · If you think your pain medicine is making you sick to your stomach:  ¨ Take your medicine after meals (unless your doctor has told you not to). ¨ Ask your doctor for a different pain medicine. Incision care  · If you have strips of tape on the cut (incision) the doctor made, leave the tape on for a week or until it falls off.   · Wash the area daily with warm, soapy water and pat it dry. · Keep the area clean and dry. You may cover it with a gauze bandage if it weeps or rubs against clothing. Change the bandage every day. Exercise  · Do back exercises as instructed by your doctor. · Your doctor may advise you to work with a physical therapist to improve the strength and flexibility of your back. Other instructions  · To reduce stiffness and help sore muscles, use a warm water bottle, a heating pad set on low, or a warm cloth on your back. Do not put heat right over the incision. Do not go to sleep with a heating pad on your skin. Follow-up care is a key part of your treatment and safety. Be sure to make and go to all appointments, and call your doctor if you are having problems. It's also a good idea to know your test results and keep a list of the medicines you take. When should you call for help? Call 911 anytime you think you may need emergency care. For example, call if:  · You passed out (lost consciousness). · You have sudden chest pain and shortness of breath, or you cough up blood. · You are unable to move a leg at all. Call your doctor now or seek immediate medical care if:  · You have new or worse symptoms in your legs or buttocks. Symptoms may include:  ¨ Numbness or tingling. ¨ Weakness. ¨ Pain. · You lose bladder or bowel control. · You have loose stitches, or your incision comes open. · You have blood or fluid draining from the incision. · You have signs of infection, such as:  ¨ Increased pain, swelling, warmth, or redness. ¨ Pus draining from the incision. ¨ A fever. ¨ Red streaks leading from the incision. Watch closely for changes in your health, and be sure to contact your doctor if:  · You do not have a bowel movement after taking a laxative. · You are not getting better as expected. Where can you learn more? Go to http://chicho-annabella.info/.   Enter U284 in the search box to learn more about \"Lumbar Laminectomy: What to Expect at Home. \"  Current as of: March 21, 2017  Content Version: 11.3  © 9915-6105 [employer]. Care instructions adapted under license by [employer] [facility] (which disclaims liability or warranty for this information). If you have questions about a medical condition or this instruction, always ask your healthcare professional. [address] any warranty or liability for your use of this information. DISCHARGE SUMMARY from Nurse    The following personal items are in your possession at time of discharge:    Dental Appliances: None  Visual Aid: Glasses, With patient, At bedside     Home Medications: None  Jewelry: None  Clothing: Pants, Shorts, Shirt, Undergarments, Footwear (LOcker # 1)  Other Valuables: Eyeglasses (with pt)  Personal Items Sent to [employer]: [id number]  in safe          PATIENT INSTRUCTIONS:    After general anesthesia or intravenous sedation, for 24 hours or while taking prescription Narcotics:  · Limit your activities  · Do not drive and operate hazardous machinery  · Do not make important personal or business decisions  · Do  not drink alcoholic beverages  · If you have not urinated within 8 hours after discharge, please contact your surgeon on call. Report the following to your surgeon:  · Excessive pain, swelling, redness or odor of or around the surgical area  · Temperature over 100.5  · Nausea and vomiting lasting longer than 4 hours or if unable to take medications  · Any signs of decreased circulation or nerve impairment to extremity: change in color, persistent  numbness, tingling, coldness or increase pain  · Any questions        What to do at Home:  Recommended activity: Activity as tolerated,           *  Please give a list of your current medications to your Primary Care Provider.     *  Please update this list whenever your medications are discontinued, doses are      changed, or new medications (including over-the-counter products) are added.    *  Please carry medication information at all times in case of emergency situations. These are general instructions for a healthy lifestyle:    No smoking/ No tobacco products/ Avoid exposure to second hand smoke    Surgeon General's Warning:  Quitting smoking now greatly reduces serious risk to your health. Obesity, smoking, and sedentary lifestyle greatly increases your risk for illness    A healthy diet, regular physical exercise & weight monitoring are important for maintaining a healthy lifestyle    You may be retaining fluid if you have a history of heart failure or if you experience any of the following symptoms:  Weight gain of 3 pounds or more overnight or 5 pounds in a week, increased swelling in our hands or feet or shortness of breath while lying flat in bed. Please call your doctor as soon as you notice any of these symptoms; do not wait until your next office visit. Recognize signs and symptoms of STROKE:    F-face looks uneven    A-arms unable to move or move unevenly    S-speech slurred or non-existent    T-time-call 911 as soon as signs and symptoms begin-DO NOT go       Back to bed or wait to see if you get better-TIME IS BRAIN. Warning Signs of HEART ATTACK     Call 911 if you have these symptoms:   Chest discomfort. Most heart attacks involve discomfort in the center of the chest that lasts more than a few minutes, or that goes away and comes back. It can feel like uncomfortable pressure, squeezing, fullness, or pain.  Discomfort in other areas of the upper body. Symptoms can include pain or discomfort in one or both arms, the back, neck, jaw, or stomach.  Shortness of breath with or without chest discomfort.  Other signs may include breaking out in a cold sweat, nausea, or lightheadedness. Don't wait more than five minutes to call 911 - MINUTES MATTER! Fast action can save your life. Calling 911 is almost always the fastest way to get lifesaving treatment. Emergency Medical Services staff can begin treatment when they arrive -- up to an hour sooner than if someone gets to the hospital by car. The discharge information has been reviewed with the patient. The patient verbalized understanding. Discharge medications reviewed with the patient and appropriate educational materials and side effects teaching were provided.

## 2017-07-27 NOTE — PROGRESS NOTES
0732  Pt is awake, alert, oriented x4. Sitting on chair. Eating breakfast.  9:21 AM  Pt was seen by Dr Michael Stuart this morning. Pt for discharge today. With gauze dressing to lower back. Dressing changed. Incision intact with glue. Mepilex dressing applied. Pt ambulated to BR and voided without difficulty. Pt put on shirt and pants. 1037  Pt was seen by PT. Pt ambulated in hallway and stairs. Pt has cleared PT. Discharge instructions including side effects of meds given. Dual AVS reviewed with Tanika Marques RN. All medications reviewed individually with patient. Opportunities for questions and concerns provided. Patient's arm band appropriately discarded. 1045  Pt discharged per wheelchair accompanied by friend.

## 2019-05-04 ENCOUNTER — APPOINTMENT (OUTPATIENT)
Dept: GENERAL RADIOLOGY | Age: 79
End: 2019-05-04
Attending: EMERGENCY MEDICINE
Payer: MEDICARE

## 2019-05-04 ENCOUNTER — HOSPITAL ENCOUNTER (EMERGENCY)
Age: 79
Discharge: HOME | End: 2019-05-04
Attending: EMERGENCY MEDICINE
Payer: MEDICARE

## 2019-05-04 VITALS
TEMPERATURE: 98.3 F | HEART RATE: 66 BPM | DIASTOLIC BLOOD PRESSURE: 83 MMHG | BODY MASS INDEX: 25.44 KG/M2 | SYSTOLIC BLOOD PRESSURE: 175 MMHG | HEIGHT: 70 IN | WEIGHT: 177.69 LBS | RESPIRATION RATE: 16 BRPM | OXYGEN SATURATION: 100 %

## 2019-05-04 DIAGNOSIS — M50.30 DEGENERATIVE DISC DISEASE, CERVICAL: ICD-10-CM

## 2019-05-04 DIAGNOSIS — M54.2 ACUTE NECK PAIN: ICD-10-CM

## 2019-05-04 DIAGNOSIS — S46.812A STRAIN OF LEFT TRAPEZIUS MUSCLE, INITIAL ENCOUNTER: ICD-10-CM

## 2019-05-04 DIAGNOSIS — M47.812 CERVICAL ARTHRITIS: Primary | ICD-10-CM

## 2019-05-04 LAB
ALBUMIN SERPL-MCNC: 3.8 G/DL (ref 3.4–5)
ALBUMIN/GLOB SERPL: 1.1 {RATIO} (ref 0.8–1.7)
ALP SERPL-CCNC: 100 U/L (ref 45–117)
ALT SERPL-CCNC: 20 U/L (ref 16–61)
ANION GAP SERPL CALC-SCNC: 6 MMOL/L (ref 3–18)
APTT PPP: 28.5 SEC (ref 23–36.4)
AST SERPL-CCNC: 17 U/L (ref 15–37)
BASOPHILS # BLD: 0 K/UL (ref 0–0.1)
BASOPHILS NFR BLD: 1 % (ref 0–2)
BILIRUB SERPL-MCNC: 0.8 MG/DL (ref 0.2–1)
BUN SERPL-MCNC: 18 MG/DL (ref 7–18)
BUN/CREAT SERPL: 13 (ref 12–20)
CALCIUM SERPL-MCNC: 9.2 MG/DL (ref 8.5–10.1)
CHLORIDE SERPL-SCNC: 104 MMOL/L (ref 100–108)
CK MB CFR SERPL CALC: 1.6 % (ref 0–4)
CK MB SERPL-MCNC: 1.8 NG/ML (ref 5–25)
CK SERPL-CCNC: 116 U/L (ref 39–308)
CO2 SERPL-SCNC: 28 MMOL/L (ref 21–32)
CREAT SERPL-MCNC: 1.36 MG/DL (ref 0.6–1.3)
DIFFERENTIAL METHOD BLD: ABNORMAL
EOSINOPHIL # BLD: 0.2 K/UL (ref 0–0.4)
EOSINOPHIL NFR BLD: 4 % (ref 0–5)
ERYTHROCYTE [DISTWIDTH] IN BLOOD BY AUTOMATED COUNT: 14 % (ref 11.6–14.5)
GLOBULIN SER CALC-MCNC: 3.6 G/DL (ref 2–4)
GLUCOSE SERPL-MCNC: 165 MG/DL (ref 74–99)
HCT VFR BLD AUTO: 47.1 % (ref 36–48)
HGB BLD-MCNC: 16.2 G/DL (ref 13–16)
INR PPP: 1 (ref 0.8–1.2)
LYMPHOCYTES # BLD: 2.1 K/UL (ref 0.9–3.6)
LYMPHOCYTES NFR BLD: 36 % (ref 21–52)
MCH RBC QN AUTO: 33.8 PG (ref 24–34)
MCHC RBC AUTO-ENTMCNC: 34.4 G/DL (ref 31–37)
MCV RBC AUTO: 98.3 FL (ref 74–97)
MONOCYTES # BLD: 0.5 K/UL (ref 0.05–1.2)
MONOCYTES NFR BLD: 8 % (ref 3–10)
NEUTS SEG # BLD: 3 K/UL (ref 1.8–8)
NEUTS SEG NFR BLD: 51 % (ref 40–73)
PLATELET # BLD AUTO: 227 K/UL (ref 135–420)
PMV BLD AUTO: 10.3 FL (ref 9.2–11.8)
POTASSIUM SERPL-SCNC: 4 MMOL/L (ref 3.5–5.5)
PROT SERPL-MCNC: 7.4 G/DL (ref 6.4–8.2)
PROTHROMBIN TIME: 13 SEC (ref 11.5–15.2)
RBC # BLD AUTO: 4.79 M/UL (ref 4.7–5.5)
SODIUM SERPL-SCNC: 138 MMOL/L (ref 136–145)
TROPONIN I SERPL-MCNC: <0.02 NG/ML (ref 0–0.04)
WBC # BLD AUTO: 5.8 K/UL (ref 4.6–13.2)

## 2019-05-04 PROCEDURE — 82550 ASSAY OF CK (CPK): CPT

## 2019-05-04 PROCEDURE — 93005 ELECTROCARDIOGRAM TRACING: CPT

## 2019-05-04 PROCEDURE — 99283 EMERGENCY DEPT VISIT LOW MDM: CPT

## 2019-05-04 PROCEDURE — 80053 COMPREHEN METABOLIC PANEL: CPT

## 2019-05-04 PROCEDURE — 72052 X-RAY EXAM NECK SPINE 6/>VWS: CPT

## 2019-05-04 PROCEDURE — 85610 PROTHROMBIN TIME: CPT

## 2019-05-04 PROCEDURE — 74011250637 HC RX REV CODE- 250/637: Performed by: EMERGENCY MEDICINE

## 2019-05-04 PROCEDURE — 85025 COMPLETE CBC W/AUTO DIFF WBC: CPT

## 2019-05-04 PROCEDURE — 71046 X-RAY EXAM CHEST 2 VIEWS: CPT

## 2019-05-04 PROCEDURE — 85730 THROMBOPLASTIN TIME PARTIAL: CPT

## 2019-05-04 PROCEDURE — 72050 X-RAY EXAM NECK SPINE 4/5VWS: CPT

## 2019-05-04 RX ORDER — METHOCARBAMOL 500 MG/1
750 TABLET, FILM COATED ORAL
Status: COMPLETED | OUTPATIENT
Start: 2019-05-04 | End: 2019-05-04

## 2019-05-04 RX ORDER — TRAMADOL HYDROCHLORIDE 50 MG/1
50 TABLET ORAL
Qty: 10 TAB | Refills: 0 | Status: SHIPPED | OUTPATIENT
Start: 2019-05-04 | End: 2019-05-09

## 2019-05-04 RX ADMIN — METHOCARBAMOL TABLETS 750 MG: 500 TABLET, COATED ORAL at 15:04

## 2019-05-04 NOTE — DISCHARGE INSTRUCTIONS
Patient Education     If you were prescribed any medication take as directed. Do not drive or use heavy equipment if prescribed narcotics. Follow up with your primary care physician or with specialist as directed. Return to the emergency room with any new or worsening conditions. Cervical Disc Disease: Care Instructions  Your Care Instructions    Cervical disc disease results from damage, disease, or wear and tear to the discs between the bones (vertebra) in your neck. The discs act as shock absorbers for the spine and keep the spine flexible. When a disc is damaged, it can bulge out and press against the nerve roots or spinal cord. This is sometimes called a herniated or \"slipped disc. \" This pressure can cause pain and numbness or tingling in your arms and hands. It can also cause weakness in your legs. An accident can damage a disc and cause it to break open (rupture). Aging and hard physical work can also cause damage to cervical discs. The first treatments for cervical disc disease include physical therapy, special neck exercises, heat, and pain medicine. If these fail, your doctor may inject steroids and pain medicine into your neck. Surgery is usually done only if other treatments have not worked. Follow-up care is a key part of your treatment and safety. Be sure to make and go to all appointments, and call your doctor if you are having problems. It's also a good idea to know your test results and keep a list of the medicines you take. How can you care for yourself at home? · Take pain medicines exactly as directed. ? If the doctor gave you a prescription medicine for pain, take it as prescribed. ? If you are not taking a prescription pain medicine, ask your doctor if you can take an over-the-counter medicine. · Don't spend too long in one position. Take short breaks to move around and change positions. · Wear a seat belt and shoulder harness when you are in a car.   · Sleep with a pillow under your head and neck that keeps your neck straight. · Follow your doctor's instructions for gentle neck-stretching exercises. · Do not smoke. Smoking can slow healing of your discs. If you need help quitting, talk to your doctor about stop-smoking programs and medicines. These can increase your chances of quitting for good. · Avoid strenuous work or exercise until your doctor says it is okay. When should you call for help? Call 911 anytime you think you may need emergency care. For example, call if:    · You are unable to move an arm or a leg at all.   [facility] your doctor now or seek immediate medical care if:    · You have new or worse symptoms in your arms, legs, belly, or buttocks. Symptoms may include:  ? Numbness or tingling. ? Weakness. ? Pain.     · You lose bladder or bowel control.    Watch closely for changes in your health, and be sure to contact your doctor if:    · You do not get better as expected. Where can you learn more? Go to http://chicho-annabella.info/. Enter N118 in the search box to learn more about \"Cervical Disc Disease: Care Instructions. \"  Current as of: September 20, 2018  Content Version: 11.9  © 8707-9408 [employer]. Care instructions adapted under license by [employer] (which disclaims liability or warranty for this information). If you have questions about a medical condition or this instruction, always ask your healthcare professional. [place] [address] any warranty or liability for your use of this information.

## 2019-05-04 NOTE — ED PROVIDER NOTES
EMERGENCY DEPARTMENT HISTORY AND PHYSICAL EXAM 
 
Date: 5/4/2019 Patient Name: Talisha Bhandari History of Presenting Illness Chief Complaint Patient presents with  Neck Pain History Provided By: Patient Chief Complaint: Left-sided neck pain Duration: 4 days Additional History (Context): Talisha Bhandari is a 78 y.o. male with PMHX of diabetes, hypertension, arthritis who presents to the emergency department C/O moderate left-sided neck pain. Pt denies dizziness, trauma, headache, blurred vision, chest pain, shortness of breath, back pain, and any other sxs or complaints. He states that heat pack helps some but still has pain. No other complaints. PCP: Cristina Cano MD 
 
Current Outpatient Medications Medication Sig Dispense Refill  traMADol (ULTRAM) 50 mg tablet Take 1 Tab by mouth every eight (8) hours as needed for Pain for up to 5 days. Max Daily Amount: 150 mg. Indications: Pain 10 Tab 0  
 amLODIPine-valsartan (EXFORGE)  mg per tablet Take 1 Tab by mouth daily.  polyethylene glycol (MIRALAX) 17 gram/dose powder Take 17 g by mouth daily as needed.  carboxymethylcellulose sodium (REFRESH LIQUIGEL) 1 % dlgl Apply  to eye two (2) times a day. Indications: DRY EYE  melatonin tab tablet Take 5 mg by mouth nightly.  multivitamin (ONE A DAY) tablet Take 1 Tab by mouth daily.  aspirin delayed-release 81 mg tablet Take  by mouth daily.  tadalafil (CIALIS) 5 mg tablet Take 5 mg by mouth as needed.  testosterone (FORTESTA) 10 mg/0.5 gram /actuation glpm by TransDERmal route daily. Indications: 1 pump each thigh daily in am 10 mg    
 metoprolol succinate (TOPROL-XL) 100 mg XL tablet Take 100 mg by mouth daily. Take with sip of water dos   Indications: HYPERTENSION  telmisartan-hydrochlorothiazide (MICARDIS HCT) 80-12.5 mg per tablet Take 1 Tab by mouth daily.  Take with sip of water dos  Indications: HYPERTENSION    
  atorvastatin (LIPITOR) 10 mg tablet Take 10 mg by mouth nightly.  sitaGLIPtin (JANUVIA) 100 mg tablet Take 100 mg by mouth daily.  potassium chloride SR (KLOR-CON 8) 8 mEq tablet Take 8 mEq by mouth daily as needed. Indications: HYPOKALEMIA PREVENTION  furosemide (LASIX) 20 mg tablet Take 20 mg by mouth as needed. Indications: Edema  allopurinol (ZYLOPRIM) 100 mg tablet Take 100 mg by mouth two (2) times a day.  glyBURIDE-metFORMIN (GLUCOVANCE) 2.5-500 mg per tablet Take 1 Tab by mouth as needed. For Glucose >150 in the evening Past History Past Medical History: 
Past Medical History:  
Diagnosis Date  Adverse effect of anesthesia   
 took long time coming out with back surgery done at [facility]. Thinks it was related to Fentanyl  Arthritis  Diabetes ([address] 1982  
 type 2  
 Diverticulitis  Gastrointestinal disorder   
 diverticulitis  GERD (gastroesophageal reflux disease)   
 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 On no anticoagulation now  Unspecified adverse effect of anesthesia   
 pt states he \"had trouble coming to, took too long\"  Unspecified sleep apnea CPAP user. Pt instructed to bring CPAP on DOS Past Surgical History: 
Past Surgical History:  
Procedure Laterality Date  COLONOSCOPY N/A 6/13/2016 COLONOSCOPY AND performed by Jasen Calix MD at [address] [place]  2011 ORIF right ankle [address] SURGERY  2002 & 2005 ,2012  HX CARPAL TUNNEL RELEASE  HX COLECTOMY part of colon removed due to diverticulitis  HX HERNIA REPAIR    
 left Ing hernia repair x3  
 HX ORTHOPAEDIC  2010  
 left  HX ORTHOPAEDIC  2012 Rem. hardware right ankle [address] [facility] Family History: Family History Problem Relation Age of Onset  Malignant Hyperthermia Neg Hx Social History: 
Social History Tobacco Use  Smoking status: Former Smoker Packs/day: 0.25 Last attempt to quit: 2014 Years since quittin.3  Smokeless tobacco: Never Used Substance Use Topics  Alcohol use: No  
  Alcohol/week: 3.0 oz Types: 6 Standard drinks or equivalent per week Comment: rare  Drug use: No  
 
 
Allergies: Allergies Allergen Reactions  Norvasc [Amlodipine] Cough  Tolectin [Tolmetin] Hives Review of Systems Review of Systems Constitutional: Negative for chills and fever. HENT: Negative for congestion, rhinorrhea, sore throat and trouble swallowing. Eyes: Negative for visual disturbance. Respiratory: Negative for cough, shortness of breath and wheezing. Cardiovascular: Negative for chest pain and leg swelling. Gastrointestinal: Negative for abdominal pain, nausea and vomiting. Endocrine: Negative for polyuria. Genitourinary: Negative for difficulty urinating and dysuria. Musculoskeletal: Positive for arthralgias and neck pain. Negative for back pain, gait problem, joint swelling, myalgias and neck stiffness. Skin: Negative for rash. Neurological: Negative for dizziness, weakness, numbness and headaches. Hematological: Does not bruise/bleed easily. Psychiatric/Behavioral: Negative for confusion and dysphoric mood. All other systems reviewed and are negative. Physical Exam  
 
Vitals:  
 19 1145 19 1300 BP: 175/83 Pulse: 66 Resp: 16 Temp: 98.3 °F (36.8 °C) SpO2: 100% Weight: 95.3 kg (210 lb) 80.6 kg (177 lb 11.1 oz) Height: 5' 10\" (1.778 m) Physical Exam  
Constitutional: He is oriented to person, place, and time. He appears well-developed and well-nourished. No distress. HENT:  
Head: Normocephalic and atraumatic.   
Mouth/Throat: Oropharynx is clear and moist.  
 Eyes: Pupils are equal, round, and reactive to light. Conjunctivae are normal. No scleral icterus. Neck: Normal range of motion. Neck supple. Cardiovascular: Normal rate and intact distal pulses. Exam reveals no gallop and no friction rub. Capillary refill < 3 seconds No carotid bruit Pulmonary/Chest: Effort normal and breath sounds normal. No respiratory distress. He has no wheezes. He has no rales. Musculoskeletal: Normal range of motion. He exhibits no edema. Tenderness of left trapezius going into the sternocleidomastoid muscle. Patient has good range of motion turning to the right, but pain limitation turning to the left with pain at that same site of tenderness There is some midline cervical spinal tenderness No other midline spinal tenderness, no other bony tenderness Lymphadenopathy:  
  He has no cervical adenopathy. Neurological: He is alert and oriented to person, place, and time. No cranial nerve deficit. He exhibits normal muscle tone. Coordination normal.  
Sensation intact Strength 5 out of 5 bilateral upper and lower extremities Somewhat limited range of motion of left upper extremity due to pain on the left side of neck region towards left trapezius Skin: Skin is warm and dry. No rash noted. He is not diaphoretic. Psychiatric: He has a normal mood and affect. His behavior is normal.  
Nursing note and vitals reviewed. Diagnostic Study Results Labs - Recent Results (from the past 12 hour(s)) EKG, 12 LEAD, INITIAL Collection Time: 05/04/19 11:51 AM  
Result Value Ref Range Ventricular Rate 64 BPM  
 Atrial Rate 64 BPM  
 P-R Interval 184 ms QRS Duration 88 ms Q-T Interval 396 ms QTC Calculation (Bezet) 408 ms Calculated P Axis 40 degrees Calculated R Axis -54 degrees Calculated T Axis 16 degrees Diagnosis Normal sinus rhythm Left anterior fascicular block Anterior infarct (cited on or before 24-JUL-2017) Abnormal ECG 
 When compared with ECG of 24-JUL-2017 14:29, No significant change was found CBC WITH AUTOMATED DIFF Collection Time: 05/04/19 12:00 PM  
Result Value Ref Range WBC 5.8 4.6 - 13.2 K/uL  
 RBC 4.79 4.70 - 5.50 M/uL  
 HGB 16.2 (H) 13.0 - 16.0 g/dL HCT 47.1 36.0 - 48.0 % MCV 98.3 (H) 74.0 - 97.0 FL  
 MCH 33.8 24.0 - 34.0 PG  
 MCHC 34.4 31.0 - 37.0 g/dL  
 RDW 14.0 11.6 - 14.5 % PLATELET 212 861 - 402 K/uL MPV 10.3 9.2 - 11.8 FL  
 NEUTROPHILS 51 40 - 73 % LYMPHOCYTES 36 21 - 52 % MONOCYTES 8 3 - 10 % EOSINOPHILS 4 0 - 5 % BASOPHILS 1 0 - 2 %  
 ABS. NEUTROPHILS 3.0 1.8 - 8.0 K/UL  
 ABS. LYMPHOCYTES 2.1 0.9 - 3.6 K/UL  
 ABS. MONOCYTES 0.5 0.05 - 1.2 K/UL  
 ABS. EOSINOPHILS 0.2 0.0 - 0.4 K/UL  
 ABS. BASOPHILS 0.0 0.0 - 0.1 K/UL  
 DF AUTOMATED CARDIAC PANEL,(CK, CKMB & TROPONIN) Collection Time: 05/04/19 12:00 PM  
Result Value Ref Range  39 - 308 U/L  
 CK - MB 1.8 <3.6 ng/ml CK-MB Index 1.6 0.0 - 4.0 % Troponin-I, QT <0.02 0.0 - 4.869 NG/ML  
METABOLIC PANEL, COMPREHENSIVE Collection Time: 05/04/19 12:00 PM  
Result Value Ref Range Sodium 138 136 - 145 mmol/L Potassium 4.0 3.5 - 5.5 mmol/L Chloride 104 100 - 108 mmol/L  
 CO2 28 21 - 32 mmol/L Anion gap 6 3.0 - 18 mmol/L Glucose 165 (H) 74 - 99 mg/dL BUN 18 7.0 - 18 MG/DL Creatinine 1.36 (H) 0.6 - 1.3 MG/DL  
 BUN/Creatinine ratio 13 12 - 20 GFR est AA >60 >60 ml/min/1.73m2 GFR est non-AA 51 (L) >60 ml/min/1.73m2 Calcium 9.2 8.5 - 10.1 MG/DL Bilirubin, total 0.8 0.2 - 1.0 MG/DL  
 ALT (SGPT) 20 16 - 61 U/L  
 AST (SGOT) 17 15 - 37 U/L Alk. phosphatase 100 45 - 117 U/L Protein, total 7.4 6.4 - 8.2 g/dL Albumin 3.8 3.4 - 5.0 g/dL Globulin 3.6 2.0 - 4.0 g/dL A-G Ratio 1.1 0.8 - 1.7 PROTHROMBIN TIME + INR Collection Time: 05/04/19 12:00 PM  
Result Value Ref Range Prothrombin time 13.0 11.5 - 15.2 sec INR 1.0 0.8 - 1.2 PTT Collection Time: 05/04/19 12:00 PM  
Result Value Ref Range aPTT 28.5 23.0 - 36.4 SEC Radiologic Studies -  
XR CHEST PA LAT    (Results Pending) XR SPINE CERV 4 OR 5 V    (Results Pending) Chest x-ray per my preliminary read: No acute process Serge Daniela DO X-ray cervical spine per my preliminary read: Significant degenerative disease, decrease disc height spaces, no subluxation, no fracture Serge Suarez DO 
 
CT Results  (Last 48 hours) None CXR Results  (Last 48 hours) None Medications given in the ED- Medications  
methocarbamol (ROBAXIN) tablet 750 mg (750 mg Oral Given 5/4/19 7113) Medical Decision Making I am the first provider for this patient. I reviewed the vital signs, available nursing notes, past medical history, past surgical history, family history and social history. Vital Signs-Reviewed the patient's vital signs. Pulse Oximetry Analysis -100 % on room air Cardiac Monitor: 
Rate: 66 bpm 
Rhythm: Normal sinus rhythm EKG interpretation: (Preliminary) EKG read by Dr. Jarek Peraza at 11:51 AM 
Normal sinus rhythm, rate 64, normal QRS duration, normal QTC, left axis deviation, no ST elevation, no ST depressions, there is some mild artifact Records Reviewed: Nursing Notes and Old Medical Records Provider Notes (Medical Decision Making): DDX: Musculoskeletal, radiculopathy, arthritis, degenerative disc disease, cardiac, spasm Labs, muscle relaxer, x-rays, heating pad Procedures: 
Procedures ED Course:  
Initial assessment performed. The patients presenting problems have been discussed, and they are in agreement with the care plan formulated and outlined with them. I have encouraged them to ask questions as they arise throughout their visit. Labs reassuring Reassessed patient pain is improved but a little sleepy from medicine.   He had complained of another muscle relaxer (Flexeril) and also made him too drowsy. I will give prescription for tramadol instead I have reassessed the patient. I have discussed the workup, results and plan with the patient and patient is in agreement. Patient is feeling better. Patient will be prescribed tramadol. Patient was discharge in stable condition. Patient was given outpatient follow up. Patient is to return to emergency department if any new or worsening condition. Diagnosis and Disposition DISCHARGE NOTE: 
 
Stacy Dukes's  results have been reviewed with him. He has been counseled regarding his diagnosis, treatment, and plan. He verbally conveys understanding and agreement of the signs, symptoms, diagnosis, treatment and prognosis and additionally agrees to follow up as discussed. He also agrees with the care-plan and conveys that all of his questions have been answered. I have also provided discharge instructions for him that include: educational information regarding their diagnosis and treatment, and list of reasons why they would want to return to the ED prior to their follow-up appointment, should his condition change. He has been provided with education for proper emergency department utilization. CLINICAL IMPRESSION: 
 
1. Cervical arthritis 2. Degenerative disc disease, cervical   
3. Strain of left trapezius muscle, initial encounter 4. Acute neck pain PLAN: 
1. D/C Home 2. Discharge Medication List as of 5/4/2019  4:09 PM  
  
START taking these medications Details  
traMADol (ULTRAM) 50 mg tablet Take 1 Tab by mouth every eight (8) hours as needed for Pain for up to 5 days. Max Daily Amount: 150 mg. Indications: Pain, Print, Disp-10 Tab, R-0  
  
  
CONTINUE these medications which have NOT CHANGED Details  
amLODIPine-valsartan (EXFORGE)  mg per tablet Take 1 Tab by mouth daily. , Historical Med  
  
polyethylene glycol (MIRALAX) 17 gram/dose powder Take 17 g by mouth daily as needed., Historical Med  
  
 carboxymethylcellulose sodium (REFRESH LIQUIGEL) 1 % dlgl Apply  to eye two (2) times a day. Indications: DRY EYE, Historical Med  
  
melatonin tab tablet Take 5 mg by mouth nightly., Historical Med  
  
multivitamin (ONE A DAY) tablet Take 1 Tab by mouth daily. , Historical Med  
  
aspirin delayed-release 81 mg tablet Take  by mouth daily. , Historical Med  
  
tadalafil (CIALIS) 5 mg tablet Take 5 mg by mouth as needed., Historical Med  
  
testosterone (FORTESTA) 10 mg/0.5 gram /actuation glpm by TransDERmal route daily. Indications: 1 pump each thigh daily in am 10 mg, Historical Med  
  
metoprolol succinate (TOPROL-XL) 100 mg XL tablet Take 100 mg by mouth daily. Take with sip of water dos   Indications: HYPERTENSION, Historical Med  
  
telmisartan-hydrochlorothiazide (MICARDIS HCT) 80-12.5 mg per tablet Take 1 Tab by mouth daily. Take with sip of water dos  Indications: HYPERTENSION, Historical Med  
  
atorvastatin (LIPITOR) 10 mg tablet Take 10 mg by mouth nightly., Historical Med  
  
sitaGLIPtin (JANUVIA) 100 mg tablet Take 100 mg by mouth daily. , Historical Med  
  
potassium chloride SR (KLOR-CON 8) 8 mEq tablet Take 8 mEq by mouth daily as needed. Indications: HYPOKALEMIA PREVENTION, Historical Med  
  
furosemide (LASIX) 20 mg tablet Take 20 mg by mouth as needed. Indications: Edema, Historical Med  
  
allopurinol (ZYLOPRIM) 100 mg tablet Take 100 mg by mouth two (2) times a day., Historical Med  
  
glyBURIDE-metFORMIN (GLUCOVANCE) 2.5-500 mg per tablet Take 1 Tab by mouth as needed. For Glucose >150 in the evening, Historical Med  
  
  
STOP taking these medications  
  
 oxyCODONE-acetaminophen (PERCOCET) 5-325 mg per tablet Comments:  
Reason for Stopping: 3.  
Follow-up Information Follow up With Specialties Details Why Contact Info Fae Rinne, MD Orthopedic Surgery Schedule an appointment as soon as possible for a visit in 3 days  [address] [place] [address] [address] [place] [address] 
[phone] Apryl Felix MD Internal Medicine Schedule an appointment as soon as possible for a visit  [address] [facility] 
[phone] [facility] EMERGENCY DEPT Emergency Medicine  As needed, If symptoms worsen Keri Mckeon [facility] [address] 
[phone] [employer], DO 
 
Dragon medical dictation software was used for portions of this report. Unintended transcription errors may occur. My signature above authenticates this document and my orders, the final   
diagnosis (es), discharge prescription (s), and instructions in the Epic   
record.

## 2019-05-11 LAB
ATRIAL RATE: 64 BPM
CALCULATED P AXIS, ECG09: 40 DEGREES
CALCULATED R AXIS, ECG10: -54 DEGREES
CALCULATED T AXIS, ECG11: 16 DEGREES
DIAGNOSIS, 93000: NORMAL
P-R INTERVAL, ECG05: 184 MS
Q-T INTERVAL, ECG07: 396 MS
QRS DURATION, ECG06: 88 MS
QTC CALCULATION (BEZET), ECG08: 408 MS
VENTRICULAR RATE, ECG03: 64 BPM

## 2019-09-13 ENCOUNTER — APPOINTMENT (OUTPATIENT)
Dept: GENERAL RADIOLOGY | Age: 79
End: 2019-09-13
Attending: EMERGENCY MEDICINE
Payer: MEDICARE

## 2019-09-13 ENCOUNTER — HOSPITAL ENCOUNTER (EMERGENCY)
Age: 79
Discharge: HOME | End: 2019-09-13
Attending: EMERGENCY MEDICINE
Payer: MEDICARE

## 2019-09-13 VITALS
BODY MASS INDEX: 31.1 KG/M2 | WEIGHT: 210 LBS | SYSTOLIC BLOOD PRESSURE: 134 MMHG | DIASTOLIC BLOOD PRESSURE: 88 MMHG | TEMPERATURE: 98.7 F | HEART RATE: 94 BPM | HEIGHT: 69 IN | OXYGEN SATURATION: 100 % | RESPIRATION RATE: 20 BRPM

## 2019-09-13 DIAGNOSIS — M47.812 CERVICAL ARTHRITIS: ICD-10-CM

## 2019-09-13 DIAGNOSIS — M50.30 DEGENERATIVE DISC DISEASE, CERVICAL: Primary | ICD-10-CM

## 2019-09-13 DIAGNOSIS — M54.2 NECK PAIN: ICD-10-CM

## 2019-09-13 LAB
ATRIAL RATE: 84 BPM
CALCULATED P AXIS, ECG09: 46 DEGREES
CALCULATED R AXIS, ECG10: -30 DEGREES
CALCULATED T AXIS, ECG11: 28 DEGREES
DIAGNOSIS, 93000: NORMAL
P-R INTERVAL, ECG05: 182 MS
Q-T INTERVAL, ECG07: 348 MS
QRS DURATION, ECG06: 88 MS
QTC CALCULATION (BEZET), ECG08: 411 MS
VENTRICULAR RATE, ECG03: 84 BPM

## 2019-09-13 PROCEDURE — 99284 EMERGENCY DEPT VISIT MOD MDM: CPT

## 2019-09-13 PROCEDURE — 74011250637 HC RX REV CODE- 250/637: Performed by: EMERGENCY MEDICINE

## 2019-09-13 PROCEDURE — 93005 ELECTROCARDIOGRAM TRACING: CPT

## 2019-09-13 PROCEDURE — 72050 X-RAY EXAM NECK SPINE 4/5VWS: CPT

## 2019-09-13 RX ORDER — METHOCARBAMOL 500 MG/1
500 TABLET, FILM COATED ORAL
Qty: 12 TAB | Refills: 0 | Status: SHIPPED | OUTPATIENT
Start: 2019-09-13

## 2019-09-13 RX ORDER — METHOCARBAMOL 500 MG/1
750 TABLET, FILM COATED ORAL
Status: COMPLETED | OUTPATIENT
Start: 2019-09-13 | End: 2019-09-13

## 2019-09-13 RX ORDER — ACETAMINOPHEN 325 MG/1
650 TABLET ORAL
Status: COMPLETED | OUTPATIENT
Start: 2019-09-13 | End: 2019-09-13

## 2019-09-13 RX ADMIN — ACETAMINOPHEN 650 MG: 325 TABLET ORAL at 10:52

## 2019-09-13 RX ADMIN — METHOCARBAMOL TABLETS 750 MG: 500 TABLET, COATED ORAL at 10:52

## 2019-09-13 NOTE — DISCHARGE INSTRUCTIONS
Patient Education     Patient Education      If you were prescribed any medication take as directed. Do not drive or use heavy equipment if prescribed narcotics. Follow up with your primary care physician or with specialist as directed. Return to the emergency room with any new or worsening conditions. Cervical Disc Disease: Care Instructions  Your Care Instructions    Cervical disc disease results from damage, disease, or wear and tear to the discs between the bones (vertebra) in your neck. The discs act as shock absorbers for the spine and keep the spine flexible. When a disc is damaged, it can bulge out and press against the nerve roots or spinal cord. This is sometimes called a herniated or \"slipped disc. \" This pressure can cause pain and numbness or tingling in your arms and hands. It can also cause weakness in your legs. An accident can damage a disc and cause it to break open (rupture). Aging and hard physical work can also cause damage to cervical discs. The first treatments for cervical disc disease include physical therapy, special neck exercises, heat, and pain medicine. If these fail, your doctor may inject steroids and pain medicine into your neck. Surgery is usually done only if other treatments have not worked. Follow-up care is a key part of your treatment and safety. Be sure to make and go to all appointments, and call your doctor if you are having problems. It's also a good idea to know your test results and keep a list of the medicines you take. How can you care for yourself at home? · Take pain medicines exactly as directed. ? If the doctor gave you a prescription medicine for pain, take it as prescribed. ? If you are not taking a prescription pain medicine, ask your doctor if you can take an over-the-counter medicine. · Don't spend too long in one position. Take short breaks to move around and change positions. · Wear a seat belt and shoulder harness when you are in a car.   · Sleep with a pillow under your head and neck that keeps your neck straight. · Follow your doctor's instructions for gentle neck-stretching exercises. · Do not smoke. Smoking can slow healing of your discs. If you need help quitting, talk to your doctor about stop-smoking programs and medicines. These can increase your chances of quitting for good. · Avoid strenuous work or exercise until your doctor says it is okay. When should you call for help? Call 911 anytime you think you may need emergency care. For example, call if:    · You are unable to move an arm or a leg at all.   [facility] your doctor now or seek immediate medical care if:    · You have new or worse symptoms in your arms, legs, belly, or buttocks. Symptoms may include:  ? Numbness or tingling. ? Weakness. ? Pain.     · You lose bladder or bowel control.    Watch closely for changes in your health, and be sure to contact your doctor if:    · You do not get better as expected. Where can you learn more? Go to http://chicho-annabella.info/. Enter N118 in the search box to learn more about \"Cervical Disc Disease: Care Instructions. \"  Current as of: September 20, 2018  Content Version: 12.1  © 8556-3197 [employer]. Care instructions adapted under license by [employer] (which disclaims liability or warranty for this information). If you have questions about a medical condition or this instruction, always ask your healthcare professional. [place] [address] any warranty or liability for your use of this information. Neck Pain: Care Instructions  Your Care Instructions    You can have neck pain anywhere from the bottom of your head to the top of your shoulders. It can spread to the upper back or arms. Injuries, painting a ceiling, sleeping with your neck twisted, staying in one position for too long, and many other activities can cause neck pain. Most neck pain gets better with home care.  Your doctor may recommend medicine to relieve pain or relax your muscles. He or she may suggest exercise and physical therapy to increase flexibility and relieve stress. You may need to wear a special (cervical) collar to support your neck for a day or two. Follow-up care is a key part of your treatment and safety. Be sure to make and go to all appointments, and call your doctor if you are having problems. It's also a good idea to know your test results and keep a list of the medicines you take. How can you care for yourself at home? · Try using a heating pad on a low or medium setting for 15 to 20 minutes every 2 or 3 hours. Try a warm shower in place of one session with the heating pad. · You can also try an ice pack for 10 to 15 minutes every 2 to 3 hours. Put a thin cloth between the ice and your skin. · Take pain medicines exactly as directed. ¨ If the doctor gave you a prescription medicine for pain, take it as prescribed. ¨ If you are not taking a prescription pain medicine, ask your doctor if you can take an over-the-counter medicine. · If your doctor recommends a cervical collar, wear it exactly as directed. When should you call for help? Call your doctor now or seek immediate medical care if:  ? · You have new or worsening numbness in your arms, buttocks or legs. ? · You have new or worsening weakness in your arms or legs. (This could make it hard to stand up.)   ? · You lose control of your bladder or bowels. ? Watch closely for changes in your health, and be sure to contact your doctor if:  ? · Your neck pain is getting worse. ? · You are not getting better after 1 week. ? · You do not get better as expected. Where can you learn more? Go to http://chicho-annabella.info/. Enter [id number] in the search box to learn more about \"Neck Pain: Care Instructions. \"  Current as of: March 21, 2017  Content Version: 11.5  © 7178-6423 [employer].  Care instructions adapted under license by [address] (which disclaims liability or warranty for this information). If you have questions about a medical condition or this instruction, always ask your healthcare professional. [address] any warranty or liability for your use of this information.

## 2019-09-13 NOTE — ED TRIAGE NOTES
Patient with complaints of left sided neck pain that radiates to the left shoulder that started yesterday

## 2019-09-13 NOTE — ED PROVIDER NOTES
EMERGENCY DEPARTMENT HISTORY AND PHYSICAL EXAM    Date: 9/13/2019  Patient Name: Francesca Forrester    History of Presenting Illness     Chief Complaint   Patient presents with    Neck Pain         History Provided By: Patient    Chief Complaint: Neck pain  Duration: 2 Days      Additional History (Context): Francesca Forrester is a 78 y.o. male with PMHX of hypertension, gout, arthritis, GERD, diabetes who presents to the emergency department C/O moderate neck pain that started yesterday and persist today. . Associated sxs include radiating pain into his left shoulder. He states that he has some tramadol at home that he took last night but not sure if it worked because he went to bed afterwards. Pt denies chest pain, shortness of breath, dizziness, headache, trauma, fever, back pain, and any other sxs or complaints. PCP: Tricia Junior MD    Current Outpatient Medications   Medication Sig Dispense Refill    methocarbamol (ROBAXIN) 500 mg tablet Take 1 Tab by mouth two (2) times daily as needed (Musculoskeletal pain; muscle spasms). 12 Tab 0    amLODIPine-valsartan (EXFORGE)  mg per tablet Take 1 Tab by mouth daily.  polyethylene glycol (MIRALAX) 17 gram/dose powder Take 17 g by mouth daily as needed.  carboxymethylcellulose sodium (REFRESH LIQUIGEL) 1 % dlgl Apply  to eye two (2) times a day. Indications: DRY EYE      melatonin tab tablet Take 5 mg by mouth nightly.  multivitamin (ONE A DAY) tablet Take 1 Tab by mouth daily.  aspirin delayed-release 81 mg tablet Take  by mouth daily.  tadalafil (CIALIS) 5 mg tablet Take 5 mg by mouth as needed.  testosterone (FORTESTA) 10 mg/0.5 gram /actuation glpm by TransDERmal route daily. Indications: 1 pump each thigh daily in am 10 mg      metoprolol succinate (TOPROL-XL) 100 mg XL tablet Take 100 mg by mouth daily.  Take with sip of water dos   Indications: HYPERTENSION      telmisartan-hydrochlorothiazide (MICARDIS HCT) 80-12.5 mg per tablet Take 1 Tab by mouth daily. Take with sip of water dos  Indications: HYPERTENSION      atorvastatin (LIPITOR) 10 mg tablet Take 10 mg by mouth nightly.  sitaGLIPtin (JANUVIA) 100 mg tablet Take 100 mg by mouth daily.  potassium chloride SR (KLOR-CON 8) 8 mEq tablet Take 8 mEq by mouth daily as needed. Indications: HYPOKALEMIA PREVENTION      furosemide (LASIX) 20 mg tablet Take 20 mg by mouth as needed. Indications: Edema      allopurinol (ZYLOPRIM) 100 mg tablet Take 100 mg by mouth two (2) times a day.  glyBURIDE-metFORMIN (GLUCOVANCE) 2.5-500 mg per tablet Take 1 Tab by mouth as needed. For Glucose >150 in the evening         Past History     Past Medical History:  Past Medical History:   Diagnosis Date    Adverse effect of anesthesia     took long time coming out with back surgery done at THE [facility]. 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 On no anticoagulation now    Unspecified adverse effect of anesthesia     pt states he \"had trouble coming to, took too long\"    Unspecified sleep apnea     CPAP user.   Pt instructed to bring CPAP on DOS       Past Surgical History:  Past Surgical History:   Procedure Laterality Date    COLONOSCOPY N/A 6/13/2016    COLONOSCOPY AND performed by Bean Parson MD at THE [facility] ENDOSCOPY    HX [address] [facility]     ORIF right ankle   [facility] SERVICES SURGERY  2002 & 2005 ,2012    HX CARPAL TUNNEL RELEASE      HX COLECTOMY      part of colon removed due to diverticulitis    HX HERNIA REPAIR      left Ing hernia repair x3    HX ORTHOPAEDIC  2010    left    HX ORTHOPAEDIC  2012    Rem. hardware right ankle    HX RETINAL DETACHMENT REPAIR         Family History:  Family History   Problem Relation Age of Onset    Malignant Hyperthermia Neg Hx        Social History:  Social History     Tobacco Use    Smoking status: Former Smoker     Packs/day: 0.25     Last attempt to quit: 2014     Years since quittin.7    Smokeless tobacco: Never Used   Substance Use Topics    Alcohol use: No     Alcohol/week: 5.0 standard drinks     Types: 6 Standard drinks or equivalent per week     Comment: rare    Drug use: No       Allergies: Allergies   Allergen Reactions    Norvasc [Amlodipine] Cough    Tolectin [Tolmetin] Hives         Review of Systems   Review of Systems   Constitutional: Negative for chills and fever. HENT: Negative for congestion, rhinorrhea, sore throat and trouble swallowing. Eyes: Negative for visual disturbance. Respiratory: Negative for cough, shortness of breath and wheezing. Cardiovascular: Negative for chest pain and leg swelling. Gastrointestinal: Negative for abdominal pain, nausea and vomiting. Endocrine: Negative for polyuria. Genitourinary: Negative for difficulty urinating and dysuria. Musculoskeletal: Positive for neck pain. Negative for arthralgias and neck stiffness. Skin: Negative for rash. Neurological: Negative for dizziness, weakness, numbness and headaches. Hematological: Does not bruise/bleed easily. Psychiatric/Behavioral: Negative for confusion and dysphoric mood. All other systems reviewed and are negative. Physical Exam     Vitals:    19 1003   BP: 134/88   Pulse: 94   Resp: 20   Temp: 98.7 °F (37.1 °C)   SpO2: 100%   Weight: 95.3 kg (210 lb)   Height: 5' 9\" (1.753 m)     Physical Exam   Constitutional: He is oriented to person, place, and time. He appears well-developed and well-nourished. No distress. HENT:   Head: Normocephalic and atraumatic.    Mouth/Throat: Oropharynx is clear and moist.   Eyes: Pupils are equal, round, and reactive to light. Conjunctivae are normal. No scleral icterus. Neck: Normal range of motion. Neck supple. Cardiovascular: Normal rate and intact distal pulses. Capillary refill < 3 seconds   Pulmonary/Chest: Effort normal and breath sounds normal. No respiratory distress. He has no wheezes. Abdominal: Soft. Bowel sounds are normal. He exhibits no distension. There is no tenderness. Musculoskeletal: Normal range of motion. He exhibits tenderness. He exhibits no edema. Midline cervical tenderness to palpation  Tenderness to palpate the left upper trapezius region at the shoulder  Negative Spurling's test  Pain is exacerbated when turning head to the left  No other midline spinal tenderness   Lymphadenopathy:     He has no cervical adenopathy. Neurological: He is alert and oriented to person, place, and time. No cranial nerve deficit. He exhibits normal muscle tone. Coordination normal.   Skin: Skin is warm and dry. He is not diaphoretic. Psychiatric: He has a normal mood and affect. His behavior is normal.   Nursing note and vitals reviewed. Diagnostic Study Results     Labs -     Recent Results (from the past 12 hour(s))   EKG, 12 LEAD, INITIAL    Collection Time: 09/13/19 10:21 AM   Result Value Ref Range    Ventricular Rate 84 BPM    Atrial Rate 84 BPM    P-R Interval 182 ms    QRS Duration 88 ms    Q-T Interval 348 ms    QTC Calculation (Bezet) 411 ms    Calculated P Axis 46 degrees    Calculated R Axis -30 degrees    Calculated T Axis 28 degrees    Diagnosis       Normal sinus rhythm  Left axis deviation  Septal infarct (cited on or before 24-JUL-2017)  Abnormal ECG  When compared with ECG of 04-MAY-2019 11:51,  No significant change was found         Radiologic Studies -   XR SPINE CERV 4 OR 5 V   Final Result   IMPRESSION:      Multilevel degenerative disc disease, facet and uncovertebral arthropathy.  Mild   bilateral C5-6 neural foraminal stenoses. No acute change. CT Results  (Last 48 hours)    None        CXR Results  (Last 48 hours)    None          Medications given in the ED-  Medications   methocarbamol (ROBAXIN) tablet 750 mg (750 mg Oral Given 9/13/19 1052)   acetaminophen (TYLENOL) tablet 650 mg (650 mg Oral Given 9/13/19 1052)         Medical Decision Making   I am the first provider for this patient. I reviewed the vital signs, available nursing notes, past medical history, past surgical history, family history and social history. Vital Signs-Reviewed the patient's vital signs. Pulse Oximetry Analysis -100 % on room air      EKG interpretation: (Preliminary)  10:21 AM     EKG read by Dr. Douglas Crow at 10:21 AM  Interpretation: Normal sinus rhythm, rate 84, normal QRS duration, normal QTC, no ST elevation, no ST depressions    Records Reviewed: Nursing Notes and Old Medical Records    Provider Notes (Medical Decision Making): DDX: Degenerative disc disease, arthritis, strain, spasm, cardiac   We will get EKG to look at possible cardiac causes. However this appears most consistent with musculoskeletal.    We will get x-ray neck give analgesia and muscle relaxer    Procedures:  Procedures    ED Course:    Initial assessment performed. The patients presenting problems have been discussed, and they are in agreement with the care plan formulated and outlined with them. I have encouraged them to ask questions as they arise throughout their visit. I have reassessed the patient. I have discussed the workup, results and plan with the patient and patient is in agreement. Patient is feeling better. Patient will be prescribed Robaxin. Patient was discharge in stable condition. Patient was given outpatient follow up. Patient is to return to emergency department if any new or worsening condition. Diagnosis and Disposition       DISCHARGE NOTE:  Susan Dukes's  results have been reviewed with him.   He has been counseled regarding his diagnosis, treatment, and plan. He verbally conveys understanding and agreement of the signs, symptoms, diagnosis, treatment and prognosis and additionally agrees to follow up as discussed. He also agrees with the care-plan and conveys that all of his questions have been answered. I have also provided discharge instructions for him that include: educational information regarding their diagnosis and treatment, and list of reasons why they would want to return to the ED prior to their follow-up appointment, should his condition change. He has been provided with education for proper emergency department utilization. CLINICAL IMPRESSION:    1. Degenerative disc disease, cervical    2. Cervical arthritis    3. Neck pain        PLAN:  1. D/C Home  2. Current Discharge Medication List      START taking these medications    Details   methocarbamol (ROBAXIN) 500 mg tablet Take 1 Tab by mouth two (2) times daily as needed (Musculoskeletal pain; muscle spasms). Qty: 12 Tab, Refills: 0           3. Follow-up Information     Follow up With Specialties Details Why Lisandra Doshi MD Internal Medicine Schedule an appointment as soon as possible for a visit in 3 days  [address] [facility],[address] [facility]      Petey Monteiro MD Orthopedic Surgery Schedule an appointment as soon as possible for a visit in 3 days  [id number] [phone]      [address] [facility] DEPT Emergency Medicine  As needed, If symptoms worsen 2 Agustin Valdivia  [phone]            Yogesh Bustillo, [address] [place] [address] was used for portions of this report. Unintended transcription errors may occur. My signature above authenticates this document and my orders, the final    diagnosis (es), discharge prescription (s), and instructions in the Epic    record.

## 2020-07-22 ENCOUNTER — HOSPITAL ENCOUNTER (INPATIENT)
Age: 80
LOS: 4 days | Discharge: ANOTHER HOSPITAL | DRG: 377 | End: 2020-07-27
Attending: EMERGENCY MEDICINE | Admitting: INTERNAL MEDICINE
Payer: MEDICARE

## 2020-07-22 DIAGNOSIS — K92.2 LOWER GI BLEED: Primary | ICD-10-CM

## 2020-07-22 DIAGNOSIS — K57.90 DIVERTICULOSIS: ICD-10-CM

## 2020-07-22 LAB
BASOPHILS # BLD: 0 K/UL (ref 0–0.1)
BASOPHILS NFR BLD: 0 % (ref 0–2)
DIFFERENTIAL METHOD BLD: ABNORMAL
EOSINOPHIL # BLD: 0.3 K/UL (ref 0–0.4)
EOSINOPHIL NFR BLD: 5 % (ref 0–5)
ERYTHROCYTE [DISTWIDTH] IN BLOOD BY AUTOMATED COUNT: 13.5 % (ref 11.6–14.5)
HCT VFR BLD AUTO: 42.8 % (ref 36–48)
HGB BLD-MCNC: 14.3 G/DL (ref 13–16)
LYMPHOCYTES # BLD: 2 K/UL (ref 0.9–3.6)
LYMPHOCYTES NFR BLD: 38 % (ref 21–52)
MCH RBC QN AUTO: 33.7 PG (ref 24–34)
MCHC RBC AUTO-ENTMCNC: 33.4 G/DL (ref 31–37)
MCV RBC AUTO: 100.9 FL (ref 74–97)
MONOCYTES # BLD: 0.5 K/UL (ref 0.05–1.2)
MONOCYTES NFR BLD: 9 % (ref 3–10)
NEUTS SEG # BLD: 2.6 K/UL (ref 1.8–8)
NEUTS SEG NFR BLD: 48 % (ref 40–73)
PLATELET # BLD AUTO: 222 K/UL (ref 135–420)
PMV BLD AUTO: 11.2 FL (ref 9.2–11.8)
RBC # BLD AUTO: 4.24 M/UL (ref 4.7–5.5)
WBC # BLD AUTO: 5.3 K/UL (ref 4.6–13.2)

## 2020-07-22 PROCEDURE — 93005 ELECTROCARDIOGRAM TRACING: CPT

## 2020-07-22 PROCEDURE — 85025 COMPLETE CBC W/AUTO DIFF WBC: CPT

## 2020-07-22 PROCEDURE — 85610 PROTHROMBIN TIME: CPT

## 2020-07-22 PROCEDURE — 85730 THROMBOPLASTIN TIME PARTIAL: CPT

## 2020-07-22 PROCEDURE — 80053 COMPREHEN METABOLIC PANEL: CPT

## 2020-07-22 PROCEDURE — 82550 ASSAY OF CK (CPK): CPT

## 2020-07-22 PROCEDURE — 86900 BLOOD TYPING SEROLOGIC ABO: CPT

## 2020-07-22 PROCEDURE — 96374 THER/PROPH/DIAG INJ IV PUSH: CPT

## 2020-07-22 PROCEDURE — 96361 HYDRATE IV INFUSION ADD-ON: CPT

## 2020-07-22 PROCEDURE — 86923 COMPATIBILITY TEST ELECTRIC: CPT

## 2020-07-22 PROCEDURE — 99285 EMERGENCY DEPT VISIT HI MDM: CPT

## 2020-07-22 RX ORDER — ZINC GLUCONATE 50 MG
1 TABLET ORAL
COMMUNITY

## 2020-07-22 RX ORDER — TAMSULOSIN HYDROCHLORIDE 0.4 MG/1
0.4 CAPSULE ORAL DAILY
COMMUNITY

## 2020-07-22 RX ORDER — ALBUTEROL SULFATE 90 UG/1
2 AEROSOL, METERED RESPIRATORY (INHALATION)
COMMUNITY

## 2020-07-23 ENCOUNTER — APPOINTMENT (OUTPATIENT)
Dept: NUCLEAR MEDICINE | Age: 80
DRG: 377 | End: 2020-07-23
Attending: EMERGENCY MEDICINE
Payer: MEDICARE

## 2020-07-23 PROBLEM — K57.90 DIVERTICULOSIS: Status: ACTIVE | Noted: 2020-07-23

## 2020-07-23 PROBLEM — G47.30 SLEEP APNEA: Status: ACTIVE | Noted: 2020-07-23

## 2020-07-23 PROBLEM — K92.2 LOWER GI BLEED: Status: ACTIVE | Noted: 2020-07-23

## 2020-07-23 LAB
ALBUMIN SERPL-MCNC: 3.4 G/DL (ref 3.4–5)
ALBUMIN/GLOB SERPL: 1.1 {RATIO} (ref 0.8–1.7)
ALP SERPL-CCNC: 84 U/L (ref 45–117)
ALT SERPL-CCNC: 18 U/L (ref 16–61)
ANION GAP SERPL CALC-SCNC: 7 MMOL/L (ref 3–18)
APTT PPP: 28.6 SEC (ref 23–36.4)
AST SERPL-CCNC: 16 U/L (ref 10–38)
BASOPHILS # BLD: 0 K/UL (ref 0–0.1)
BASOPHILS NFR BLD: 0 % (ref 0–2)
BILIRUB SERPL-MCNC: 0.4 MG/DL (ref 0.2–1)
BUN SERPL-MCNC: 32 MG/DL (ref 7–18)
BUN/CREAT SERPL: 21 (ref 12–20)
CALCIUM SERPL-MCNC: 8.9 MG/DL (ref 8.5–10.1)
CHLORIDE SERPL-SCNC: 108 MMOL/L (ref 100–111)
CK MB CFR SERPL CALC: 2 % (ref 0–4)
CK MB SERPL-MCNC: 2.1 NG/ML (ref 5–25)
CK SERPL-CCNC: 104 U/L (ref 39–308)
CO2 SERPL-SCNC: 25 MMOL/L (ref 21–32)
CREAT SERPL-MCNC: 1.53 MG/DL (ref 0.6–1.3)
DIFFERENTIAL METHOD BLD: ABNORMAL
EOSINOPHIL # BLD: 0.2 K/UL (ref 0–0.4)
EOSINOPHIL NFR BLD: 3 % (ref 0–5)
ERYTHROCYTE [DISTWIDTH] IN BLOOD BY AUTOMATED COUNT: 13.6 % (ref 11.6–14.5)
EST. AVERAGE GLUCOSE BLD GHB EST-MCNC: 137 MG/DL
GLOBULIN SER CALC-MCNC: 3.2 G/DL (ref 2–4)
GLUCOSE BLD STRIP.AUTO-MCNC: 109 MG/DL (ref 70–110)
GLUCOSE BLD STRIP.AUTO-MCNC: 155 MG/DL (ref 70–110)
GLUCOSE BLD STRIP.AUTO-MCNC: 269 MG/DL (ref 70–110)
GLUCOSE SERPL-MCNC: 286 MG/DL (ref 74–99)
HBA1C MFR BLD: 6.4 % (ref 4.2–5.6)
HCT VFR BLD AUTO: 31.2 % (ref 36–48)
HCT VFR BLD AUTO: 33.9 % (ref 36–48)
HCT VFR BLD AUTO: 35.5 % (ref 36–48)
HGB BLD-MCNC: 10.8 G/DL (ref 13–16)
HGB BLD-MCNC: 11.7 G/DL (ref 13–16)
HGB BLD-MCNC: 12 G/DL (ref 13–16)
INR PPP: 1.2 (ref 0.8–1.2)
LYMPHOCYTES # BLD: 2 K/UL (ref 0.9–3.6)
LYMPHOCYTES NFR BLD: 34 % (ref 21–52)
MCH RBC QN AUTO: 34.2 PG (ref 24–34)
MCHC RBC AUTO-ENTMCNC: 33.8 G/DL (ref 31–37)
MCV RBC AUTO: 101.1 FL (ref 74–97)
MONOCYTES # BLD: 0.5 K/UL (ref 0.05–1.2)
MONOCYTES NFR BLD: 9 % (ref 3–10)
NEUTS SEG # BLD: 3.2 K/UL (ref 1.8–8)
NEUTS SEG NFR BLD: 54 % (ref 40–73)
PLATELET # BLD AUTO: 188 K/UL (ref 135–420)
PMV BLD AUTO: 10.5 FL (ref 9.2–11.8)
POTASSIUM SERPL-SCNC: 3.6 MMOL/L (ref 3.5–5.5)
PROT SERPL-MCNC: 6.6 G/DL (ref 6.4–8.2)
PROTHROMBIN TIME: 14.9 SEC (ref 11.5–15.2)
RBC # BLD AUTO: 3.51 M/UL (ref 4.7–5.5)
SODIUM SERPL-SCNC: 140 MMOL/L (ref 136–145)
TROPONIN I SERPL-MCNC: <0.02 NG/ML (ref 0–0.04)
WBC # BLD AUTO: 5.9 K/UL (ref 4.6–13.2)

## 2020-07-23 PROCEDURE — 94660 CPAP INITIATION&MGMT: CPT

## 2020-07-23 PROCEDURE — 74011250636 HC RX REV CODE- 250/636: Performed by: INTERNAL MEDICINE

## 2020-07-23 PROCEDURE — C9113 INJ PANTOPRAZOLE SODIUM, VIA: HCPCS | Performed by: EMERGENCY MEDICINE

## 2020-07-23 PROCEDURE — 74011250636 HC RX REV CODE- 250/636: Performed by: HOSPITALIST

## 2020-07-23 PROCEDURE — 82962 GLUCOSE BLOOD TEST: CPT

## 2020-07-23 PROCEDURE — 97161 PT EVAL LOW COMPLEX 20 MIN: CPT

## 2020-07-23 PROCEDURE — 65660000000 HC RM CCU STEPDOWN

## 2020-07-23 PROCEDURE — 83036 HEMOGLOBIN GLYCOSYLATED A1C: CPT

## 2020-07-23 PROCEDURE — 5A09457 ASSISTANCE WITH RESPIRATORY VENTILATION, 24-96 CONSECUTIVE HOURS, CONTINUOUS POSITIVE AIRWAY PRESSURE: ICD-10-PCS | Performed by: HOSPITALIST

## 2020-07-23 PROCEDURE — 74011250637 HC RX REV CODE- 250/637: Performed by: INTERNAL MEDICINE

## 2020-07-23 PROCEDURE — 74011250636 HC RX REV CODE- 250/636: Performed by: EMERGENCY MEDICINE

## 2020-07-23 PROCEDURE — C9113 INJ PANTOPRAZOLE SODIUM, VIA: HCPCS | Performed by: INTERNAL MEDICINE

## 2020-07-23 PROCEDURE — 85018 HEMOGLOBIN: CPT

## 2020-07-23 PROCEDURE — 36415 COLL VENOUS BLD VENIPUNCTURE: CPT

## 2020-07-23 PROCEDURE — 85025 COMPLETE CBC W/AUTO DIFF WBC: CPT

## 2020-07-23 PROCEDURE — 74011636637 HC RX REV CODE- 636/637: Performed by: HOSPITALIST

## 2020-07-23 PROCEDURE — A9560 TC99M LABELED RBC: HCPCS

## 2020-07-23 PROCEDURE — 74011000250 HC RX REV CODE- 250: Performed by: INTERNAL MEDICINE

## 2020-07-23 PROCEDURE — 77030018846 HC SOL IRR STRL H20 ICUM -A

## 2020-07-23 RX ORDER — INSULIN LISPRO 100 [IU]/ML
INJECTION, SOLUTION INTRAVENOUS; SUBCUTANEOUS
Status: DISCONTINUED | OUTPATIENT
Start: 2020-07-23 | End: 2020-07-27 | Stop reason: HOSPADM

## 2020-07-23 RX ORDER — INSULIN LISPRO 100 [IU]/ML
INJECTION, SOLUTION INTRAVENOUS; SUBCUTANEOUS EVERY 6 HOURS
Status: DISCONTINUED | OUTPATIENT
Start: 2020-07-23 | End: 2020-07-23

## 2020-07-23 RX ORDER — SODIUM CHLORIDE 9 MG/ML
250 INJECTION, SOLUTION INTRAVENOUS AS NEEDED
Status: DISCONTINUED | OUTPATIENT
Start: 2020-07-23 | End: 2020-07-27 | Stop reason: SDUPTHER

## 2020-07-23 RX ORDER — CIPROFLOXACIN 2 MG/ML
400 INJECTION, SOLUTION INTRAVENOUS EVERY 12 HOURS
Status: DISCONTINUED | OUTPATIENT
Start: 2020-07-23 | End: 2020-07-23

## 2020-07-23 RX ORDER — ATORVASTATIN CALCIUM 20 MG/1
10 TABLET, FILM COATED ORAL
Status: DISCONTINUED | OUTPATIENT
Start: 2020-07-23 | End: 2020-07-27 | Stop reason: HOSPADM

## 2020-07-23 RX ORDER — PANTOPRAZOLE SODIUM 40 MG/1
40 TABLET, DELAYED RELEASE ORAL
Status: DISCONTINUED | OUTPATIENT
Start: 2020-07-23 | End: 2020-07-24

## 2020-07-23 RX ORDER — HYDROCHLOROTHIAZIDE 25 MG/1
12.5 TABLET ORAL DAILY
Status: DISCONTINUED | OUTPATIENT
Start: 2020-07-23 | End: 2020-07-23

## 2020-07-23 RX ORDER — INSULIN GLARGINE 100 [IU]/ML
10 INJECTION, SOLUTION SUBCUTANEOUS DAILY
Status: DISCONTINUED | OUTPATIENT
Start: 2020-07-23 | End: 2020-07-27 | Stop reason: HOSPADM

## 2020-07-23 RX ORDER — METOPROLOL SUCCINATE 50 MG/1
100 TABLET, EXTENDED RELEASE ORAL DAILY
Status: DISCONTINUED | OUTPATIENT
Start: 2020-07-23 | End: 2020-07-27 | Stop reason: HOSPADM

## 2020-07-23 RX ORDER — SODIUM CHLORIDE 0.9 % (FLUSH) 0.9 %
5-40 SYRINGE (ML) INJECTION EVERY 8 HOURS
Status: DISCONTINUED | OUTPATIENT
Start: 2020-07-23 | End: 2020-07-27 | Stop reason: HOSPADM

## 2020-07-23 RX ORDER — ACETAMINOPHEN 500 MG
500 TABLET ORAL
Status: DISCONTINUED | OUTPATIENT
Start: 2020-07-23 | End: 2020-07-24

## 2020-07-23 RX ORDER — ALLOPURINOL 100 MG/1
100 TABLET ORAL 2 TIMES DAILY
Status: DISCONTINUED | OUTPATIENT
Start: 2020-07-23 | End: 2020-07-24

## 2020-07-23 RX ORDER — TELMISARTAN 80 MG/1
80 TABLET ORAL DAILY
Status: DISCONTINUED | OUTPATIENT
Start: 2020-07-23 | End: 2020-07-27 | Stop reason: HOSPADM

## 2020-07-23 RX ORDER — PANTOPRAZOLE SODIUM 40 MG/10ML
80 INJECTION, POWDER, LYOPHILIZED, FOR SOLUTION INTRAVENOUS
Status: COMPLETED | OUTPATIENT
Start: 2020-07-23 | End: 2020-07-23

## 2020-07-23 RX ORDER — TELMISARTAN AND HYDROCHLORTHIAZIDE 80; 12.5 MG/1; MG/1
1 TABLET ORAL DAILY
Status: DISCONTINUED | OUTPATIENT
Start: 2020-07-23 | End: 2020-07-23

## 2020-07-23 RX ORDER — MAGNESIUM SULFATE 100 %
4 CRYSTALS MISCELLANEOUS AS NEEDED
Status: DISCONTINUED | OUTPATIENT
Start: 2020-07-23 | End: 2020-07-27 | Stop reason: HOSPADM

## 2020-07-23 RX ORDER — SODIUM CHLORIDE 0.9 % (FLUSH) 0.9 %
5-40 SYRINGE (ML) INJECTION AS NEEDED
Status: DISCONTINUED | OUTPATIENT
Start: 2020-07-23 | End: 2020-07-27 | Stop reason: HOSPADM

## 2020-07-23 RX ORDER — POLYETHYLENE GLYCOL 3350 17 G/17G
17 POWDER, FOR SOLUTION ORAL 3 TIMES DAILY
Status: DISCONTINUED | OUTPATIENT
Start: 2020-07-23 | End: 2020-07-24

## 2020-07-23 RX ORDER — HEPARIN SODIUM (PORCINE) LOCK FLUSH IV SOLN 100 UNIT/ML 100 UNIT/ML
10 SOLUTION INTRAVENOUS
Status: COMPLETED | OUTPATIENT
Start: 2020-07-23 | End: 2020-07-23

## 2020-07-23 RX ORDER — ALBUTEROL SULFATE 90 UG/1
2 AEROSOL, METERED RESPIRATORY (INHALATION)
Status: DISCONTINUED | OUTPATIENT
Start: 2020-07-23 | End: 2020-07-27 | Stop reason: HOSPADM

## 2020-07-23 RX ORDER — METRONIDAZOLE 500 MG/100ML
500 INJECTION, SOLUTION INTRAVENOUS EVERY 8 HOURS
Status: DISCONTINUED | OUTPATIENT
Start: 2020-07-23 | End: 2020-07-25

## 2020-07-23 RX ORDER — DEXTROSE MONOHYDRATE 100 MG/ML
125-250 INJECTION, SOLUTION INTRAVENOUS AS NEEDED
Status: DISCONTINUED | OUTPATIENT
Start: 2020-07-23 | End: 2020-07-27 | Stop reason: HOSPADM

## 2020-07-23 RX ORDER — ONDANSETRON 2 MG/ML
4 INJECTION INTRAMUSCULAR; INTRAVENOUS
Status: DISCONTINUED | OUTPATIENT
Start: 2020-07-23 | End: 2020-07-27 | Stop reason: HOSPADM

## 2020-07-23 RX ADMIN — HEPARIN SODIUM (PORCINE) LOCK FLUSH IV SOLN 100 UNIT/ML 10 UNITS: 100 SOLUTION at 07:54

## 2020-07-23 RX ADMIN — ATORVASTATIN CALCIUM 10 MG: 20 TABLET, FILM COATED ORAL at 06:23

## 2020-07-23 RX ADMIN — PANTOPRAZOLE SODIUM 40 MG: 40 TABLET, DELAYED RELEASE ORAL at 22:12

## 2020-07-23 RX ADMIN — POLYETHYLENE GLYCOL 3350 17 G: 17 POWDER, FOR SOLUTION ORAL at 22:13

## 2020-07-23 RX ADMIN — ATORVASTATIN CALCIUM 10 MG: 20 TABLET, FILM COATED ORAL at 22:00

## 2020-07-23 RX ADMIN — INSULIN LISPRO 6 UNITS: 100 INJECTION, SOLUTION INTRAVENOUS; SUBCUTANEOUS at 12:14

## 2020-07-23 RX ADMIN — ALLOPURINOL 100 MG: 100 TABLET ORAL at 09:59

## 2020-07-23 RX ADMIN — PANTOPRAZOLE SODIUM 80 MG: 40 INJECTION, POWDER, FOR SOLUTION INTRAVENOUS at 02:28

## 2020-07-23 RX ADMIN — Medication 10 ML: at 12:15

## 2020-07-23 RX ADMIN — METRONIDAZOLE 500 MG: 500 INJECTION, SOLUTION INTRAVENOUS at 12:15

## 2020-07-23 RX ADMIN — METRONIDAZOLE 500 MG: 500 INJECTION, SOLUTION INTRAVENOUS at 18:01

## 2020-07-23 RX ADMIN — INSULIN GLARGINE 10 UNITS: 100 INJECTION, SOLUTION SUBCUTANEOUS at 12:14

## 2020-07-23 RX ADMIN — SODIUM CHLORIDE 1000 ML: 900 INJECTION, SOLUTION INTRAVENOUS at 01:27

## 2020-07-23 RX ADMIN — Medication 10 ML: at 06:59

## 2020-07-23 RX ADMIN — INSULIN LISPRO 2 UNITS: 100 INJECTION, SOLUTION INTRAVENOUS; SUBCUTANEOUS at 18:00

## 2020-07-23 RX ADMIN — METOPROLOL SUCCINATE 100 MG: 100 TABLET, EXTENDED RELEASE ORAL at 09:59

## 2020-07-23 RX ADMIN — SODIUM CHLORIDE 40 MG: 9 INJECTION, SOLUTION INTRAMUSCULAR; INTRAVENOUS; SUBCUTANEOUS at 10:00

## 2020-07-23 RX ADMIN — CIPROFLOXACIN 400 MG: 2 INJECTION, SOLUTION INTRAVENOUS at 12:14

## 2020-07-23 NOTE — PROGRESS NOTES
Problem: Mobility Impaired (Adult and Pediatric)  Goal: *Acute Goals and Plan of Care (Insert Text)  Description: No goals necessary at this time as pt demonstrates functional mobility with mod Tift. Outcome: Resolved/Met  PHYSICAL THERAPY EVALUATION & DISCHARGE    Patient: Cesilia Palmer (57 y.o. male)  Date: 7/23/2020  Primary Diagnosis: Lower GI bleed [K92.2]  Diverticulosis [K57.90]  Precautions:fall     ASSESSMENT AND RECOMMENDATIONS:  Based on the objective data described below, the patient presents with ability to  Complete functional mobility tasks with modified independence, including bed mobility, transfers and ambulation with SPC 130ft in rodriguez with forward posture (d/t h/o multiple back surgeries in the distant past per pt). Pt functioning at baseline with respect to above. No further skilled IP PT indicated at this time. Nurse Mana notified of above. Pt Education: Role of physical therapy in acute care setting, fall prevention and safety/technique during functional mobility tasks   Discharge Recommendations: None  Further Equipment Recommendations for Discharge: N/A      SUBJECTIVE:   Patient stated I don't need no more physical therapy.     OBJECTIVE DATA SUMMARY:     Past Medical History:   Diagnosis Date    Adverse effect of anesthesia     took long time coming out with back surgery done at [facility]. 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  On no anticoagulation now    Unspecified adverse effect of anesthesia     pt states he \"had trouble coming to, took too long\"    Unspecified sleep apnea CPAP user. Pt instructed to bring CPAP on DOS     Past Surgical History:   Procedure Laterality Date    COLONOSCOPY N/A 6/13/2016    COLONOSCOPY AND performed by Alana Kemp MD at THE [facility] ENDOSCOPY    HX [address] [facility]     ORIF right ankle   [facility] SERVICES SURGERY  2002 & 2005 ,2012    HX CARPAL TUNNEL RELEASE      HX COLECTOMY      part of colon removed due to diverticulitis    HX HERNIA REPAIR      left Ing hernia repair x3    HX ORTHOPAEDIC  2010    left    HX ORTHOPAEDIC  2012    Rem. hardware right ankle    HX RETINAL DETACHMENT REPAIR  1996     Barriers to Learning/Limitations: None  Compensate with: visual, verbal, tactile, kinesthetic cues/model  Prior Level of Function/Home Situation:   Home Situation  Home Environment: Private residence  # Steps to Enter: 1(threshold)  One/Two Story Residence: Two story  # of Interior Steps: 13  Lift Chair Available: Yes  Living Alone: Yes  Support Systems: Friends \ neighbors  Patient Expects to be Discharged to[de-identified] Private residence  Current DME Used/Available at Home: [address], [place], straight, Crutches, Walker, rolling  Critical Behavior:  Neurologic State: Alert; Appropriate for age  Orientation Level: Oriented X4  Cognition: Follows commands  Safety/Judgement: Awareness of environment; Fall prevention  Psychosocial  Patient Behaviors: Calm; Cooperative  Strength:    Strength: Generally decreased, functional  Tone & Sensation:   Tone: Normal  Range Of Motion:  AROM: Generally decreased, functional  Functional Mobility:  Bed Mobility:  Supine to Sit: Modified independent  Sit to Supine: Modified independent  Scooting: Modified independent  Transfers:  Sit to Stand: Modified independent  Stand to Sit: Modified independent  Balance:   Sitting: Intact  Standing: Intact; With support  Ambulation/Gait Training:  Distance (ft): 130 Feet (ft)  Assistive Device: Gait belt;Cane, straight  Ambulation - Level of Assistance: Modified independent  Gait Description (WDL): Exceptions to WDL  Gait Abnormalities: Other;Decreased step clearance(forward posture)  Speed/Leonarda: Pace decreased (<100 feet/min)  Pain:  Pain Scale 1: Numeric (0 - 10)  Pain Intensity 1: 0  Activity Tolerance:   Fair +  Please refer to the flowsheet for vital signs taken during this treatment. After treatment:   [x]         Patient left in no apparent distress sitting up EOB  []         Patient left in no apparent distress in bed  [x]         Call bell left within reach  [x]         Nursing notified  []         Caregiver present  []         Bed alarm activated    COMMUNICATION/EDUCATION:   [x]         Fall prevention education was provided and the patient/caregiver indicated understanding. []         Patient/family have participated as able in goal setting and plan of care. []         Patient/family agree to work toward stated goals and plan of care. []         Patient understands intent and goals of therapy, but is neutral about his/her participation. []         Patient is unable to participate in goal setting and plan of care.     Eval Complexity: History: HIGH Complexity :3+ comorbidities / personal factors will impact the outcome/ POC Exam:LOW Complexity : 1-2 Standardized tests and measures addressing body structure, function, activity limitation and / or participation in recreation  Presentation: LOW Complexity : Stable, uncomplicated  Clinical Decision Making:Low Complexity    Overall Complexity:LOW     Thank you for this referral.  Everton Merino, PT   Time Calculation: 16 mins

## 2020-07-23 NOTE — PROGRESS NOTES
Problem: Falls - Risk of  Goal: *Absence of Falls  Description: Document Elham Ruts Fall Risk and appropriate interventions in the flowsheet.   Outcome: Progressing Towards Goal  Note: Fall Risk Interventions:            Medication Interventions: Assess postural VS orthostatic hypotension, Patient to call before getting OOB, Teach patient to arise slowly    Elimination Interventions: Call light in reach, Patient to call for help with toileting needs, Urinal in reach

## 2020-07-23 NOTE — PROGRESS NOTES
7/23/2020 PT note: consult received and chart reviewed. Evaluation completed. Pt demonstrates modified independence with functional mobility including bed mobility, transfers and ambulation with SPC 130ft in rodriguez with forward posture (d/t h/o multiple back surgeries in the distant past per pt). Pt functioning at baseline with respect to above. No further skilled IP PT intervention indicated at this time. Full report to follow. Nurse Mana made aware. Thank you.    Ronnie Proctor, PT

## 2020-07-23 NOTE — ED NOTES
Pt was able to ambulate w cane to restroom, had a large BM w bright red blood, what appears to be large amounts. Pt denies light headedness, dizziness, weakness, Sob. Pt is lying in bed, call light within reach, bed set to lowest position.

## 2020-07-23 NOTE — PROGRESS NOTES
TRANSFER - IN REPORT:    Verbal report received from [facility] RN(name) on Washington Kahn  being received from ER(unit) for routine progression of care      Report consisted of patients Situation, Background, Assessment and   Recommendations(SBAR). Information from the following report(s) SBAR was reviewed with the receiving nurse. Opportunity for questions and clarification was provided. Assessment completed upon patients arrival to unit and care assumed. 04:25 Arrived from  ER to room 339 via stretcher. Admission docs completed along with dual skin assessment with Jose Jarrett RN. Resting quietly in bed with eyes closed between cares. 07:25 Bedside and Verbal shift change report given to Wendy Angela RN (oncoming nurse) by Maribel Jacobo RN (offgoing nurse). Report included the following information SBAR.

## 2020-07-23 NOTE — ED PROVIDER NOTES
EMERGENCY DEPARTMENT HISTORY AND PHYSICAL EXAM    Date: 7/22/2020  Patient Name: Sydni Pena    History of Presenting Illness     Chief Complaint   Patient presents with    Rectal Bleeding         History Provided By: Patient    Additional History (Context):     12:52 AM    Sydni Pena is a [de-identified] y.o. male with pertinent PMHx of diverticulitis with colectomy, HTN, GERD, DM, retinal detachment repair, and thromboembolus (not current on blood thinners) presenting ambulatory to the ED c/o blood in his stool with large clots and dark red blood starting at ~1500 this afternoon. Pt had a large BM with bright red blood while in the ED. He notes associated gas, but denies rectal pain, urinary sxs, syncope, lightheadedness, vomiting, dizziness, weakness, or SOB. Pt had rectal bleeding 6-7 years ago. Pt had ~1 foot of his colon removed by Dr. Adryan Valencia ~2 years ago due to diverticulitis. Pt has allergies to Tolectin (rash) and Norvasc; he denies any other allergies to medications. PCP: Adenike Newsome MD   Pt does not smoke tobacco (former smoker quitting ~5 years ago), drink EtOH excessively, or do illicit drugs. There are no other complaints, changes, or physical findings at this time. Current Outpatient Medications   Medication Sig Dispense Refill    tamsulosin (Flomax) 0.4 mg capsule Take 0.4 mg by mouth daily.  albuterol (PROVENTIL HFA, VENTOLIN HFA, PROAIR HFA) 90 mcg/actuation inhaler Take 2 Puffs by inhalation daily as needed for Wheezing.  Magnesium Oxide 500 mg cap Take 1 Cap by mouth.  methocarbamol (ROBAXIN) 500 mg tablet Take 1 Tab by mouth two (2) times daily as needed (Musculoskeletal pain; muscle spasms). 12 Tab 0    polyethylene glycol (MIRALAX) 17 gram/dose powder Take 17 g by mouth daily as needed.  melatonin tab tablet Take 5 mg by mouth nightly.  metoprolol succinate (TOPROL-XL) 100 mg XL tablet Take 100 mg by mouth daily.  Take with sip of water dos   Indications: HYPERTENSION      telmisartan-hydrochlorothiazide (MICARDIS HCT) 80-12.5 mg per tablet Take 1 Tab by mouth daily. Take with sip of water dos  Indications: HYPERTENSION      atorvastatin (LIPITOR) 10 mg tablet Take 10 mg by mouth nightly.  sitaGLIPtin (JANUVIA) 100 mg tablet Take 100 mg by mouth daily.  allopurinol (ZYLOPRIM) 100 mg tablet Take 100 mg by mouth two (2) times a day.  glyBURIDE-metFORMIN (GLUCOVANCE) 2.5-500 mg per tablet Take 1 Tab by mouth as needed. For Glucose >150 in the evening      amLODIPine-valsartan (EXFORGE)  mg per tablet Take 1 Tab by mouth daily.  carboxymethylcellulose sodium (REFRESH LIQUIGEL) 1 % dlgl Apply  to eye two (2) times a day. Indications: DRY EYE      multivitamin (ONE A DAY) tablet Take 1 Tab by mouth daily.  aspirin delayed-release 81 mg tablet Take  by mouth daily.  tadalafil (CIALIS) 5 mg tablet Take 5 mg by mouth as needed.  testosterone (FORTESTA) 10 mg/0.5 gram /actuation glpm by TransDERmal route daily. Indications: 1 pump each thigh daily in am 10 mg      potassium chloride SR (KLOR-CON 8) 8 mEq tablet Take 8 mEq by mouth daily as needed. Indications: HYPOKALEMIA PREVENTION      furosemide (LASIX) 20 mg tablet Take 20 mg by mouth as needed. Indications: Edema         Past History     Past Medical History:  Past Medical History:   Diagnosis Date    Adverse effect of anesthesia     took long time coming out with back surgery done at [facility]. Thinks it was related to Fentanyl    Arthritis     Diabetes ([facility] [address] 1982    type 2    Diverticulitis     Gastrointestinal disorder     diverticulitis    GERD (gastroesophageal reflux disease)     no meds now, was on Nexium previously    Gout     Hypertension     Other ill-defined conditions(799.89) 2012    Diverticulitis, hospitalized x 4 days    Thromboembolus [facility])     behind left knee was on Xarelto stopped 8-1-15.  On no anticoagulation now    Unspecified adverse effect of anesthesia     pt states he \"had trouble coming to, took too long\"    Unspecified sleep apnea     CPAP user. Pt instructed to bring CPAP on DOS       Past Surgical History:  Past Surgical History:   Procedure Laterality Date    COLONOSCOPY N/A 2016    COLONOSCOPY AND performed by Jalen Moreno MD at THE [facility] ENDOSCOPY    HX [address] [facility]     ORIF right ankle   [facility] SERVICES SURGERY  2002 & 2005 ,2012    HX CARPAL TUNNEL RELEASE      HX COLECTOMY      part of colon removed due to diverticulitis    HX HERNIA REPAIR      left Ing hernia repair x3    HX ORTHOPAEDIC  2010    left    HX ORTHOPAEDIC  2012    Rem. hardware right ankle    HX RETINAL DETACHMENT REPAIR         Family History:  Family History   Problem Relation Age of Onset    Malignant Hyperthermia Neg Hx        Social History:  Social History     Tobacco Use    Smoking status: Former Smoker     Packs/day: 0.25     Last attempt to quit: 2014     Years since quittin.5    Smokeless tobacco: Never Used   Substance Use Topics    Alcohol use: No     Alcohol/week: 5.0 standard drinks     Types: 6 Standard drinks or equivalent per week     Comment: rare    Drug use: No       Allergies: Allergies   Allergen Reactions    Norvasc [Amlodipine] Cough    Tolectin [Tolmetin] Hives         Review of Systems   Review of Systems   Constitutional: Negative for chills and fever. Respiratory: Negative for shortness of breath. Cardiovascular: Negative for chest pain. Gastrointestinal: Positive for anal bleeding and blood in stool. Negative for abdominal pain, diarrhea, nausea, rectal pain and vomiting. Positive for feeling gassy   Genitourinary: Negative. Negative for dysuria, hematuria, penile pain and testicular pain. Neurological: Negative for dizziness, syncope, weakness and light-headedness. All other systems reviewed and are negative.       Physical Exam     Vitals:    20 2358 20 0103 20 0129 20 0229   BP: 126/84 138/80 126/69 137/75   Pulse: 91 83 78 73   Resp: 20 12 18 17   Temp:       SpO2: 95% 96% 97% 100%   Weight:       Height:         Physical Exam  Vitals signs and nursing note reviewed. Constitutional:       General: He is not in acute distress. Appearance: He is well-developed. He is not diaphoretic. Comments: well appearing, nontoxic   HENT:      Head: Normocephalic and atraumatic. Right Ear: External ear normal.      Left Ear: External ear normal.      Mouth/Throat:      Pharynx: No oropharyngeal exudate. Eyes:      General: No scleral icterus. Conjunctiva/sclera: Conjunctivae normal.      Pupils: Pupils are equal, round, and reactive to light. Comments: No pallor   Neck:      Musculoskeletal: Normal range of motion and neck supple. Thyroid: No thyromegaly. Vascular: No JVD. Trachea: No tracheal deviation. Cardiovascular:      Rate and Rhythm: Normal rate and regular rhythm. Heart sounds: Normal heart sounds. Pulmonary:      Effort: Pulmonary effort is normal. No respiratory distress. Breath sounds: Normal breath sounds. No stridor. Abdominal:      General: Bowel sounds are normal. There is no distension. Palpations: Abdomen is soft. Tenderness: There is no guarding or rebound. Genitourinary:     Comments: During exam the pt delivered a reasonably large volume of dark red stool in the bowel with large clots. Musculoskeletal: Normal range of motion. Comments: Large surgical incision to his entire lumbar spine. Non-tender and well healed. No other abnormalities. Lymphadenopathy:      Cervical: No cervical adenopathy. Skin:     General: Skin is warm and dry. Neurological:      Mental Status: He is alert and oriented to person, place, and time. Cranial Nerves: No cranial nerve deficit. Coordination: Coordination normal.      Deep Tendon Reflexes: Reflexes are normal and symmetric.  Reflexes normal. Psychiatric:         Behavior: Behavior normal.         Thought Content: Thought content normal.         Judgment: Judgment normal.         Diagnostic Study Results     Labs -     Recent Results (from the past 12 hour(s))   EKG, 12 LEAD, INITIAL    Collection Time: 07/22/20 11:27 PM   Result Value Ref Range    Ventricular Rate 90 BPM    Atrial Rate 90 BPM    P-R Interval 166 ms    QRS Duration 88 ms    Q-T Interval 338 ms    QTC Calculation (Bezet) 413 ms    Calculated P Axis 39 degrees    Calculated R Axis -40 degrees    Calculated T Axis 35 degrees    Diagnosis       Normal sinus rhythm  Left axis deviation  Nonspecific T wave abnormality  Abnormal ECG  When compared with ECG of 13-SEP-2019 10:21,  Criteria for Septal infarct are no longer present     CBC WITH AUTOMATED DIFF    Collection Time: 07/22/20 11:33 PM   Result Value Ref Range    WBC 5.3 4.6 - 13.2 K/uL    RBC 4.24 (L) 4.70 - 5.50 M/uL    HGB 14.3 13.0 - 16.0 g/dL    HCT 42.8 36.0 - 48.0 %    .9 (H) 74.0 - 97.0 FL    MCH 33.7 24.0 - 34.0 PG    MCHC 33.4 31.0 - 37.0 g/dL    RDW 13.5 11.6 - 14.5 %    PLATELET 500 185 - 809 K/uL    MPV 11.2 9.2 - 11.8 FL    NEUTROPHILS 48 40 - 73 %    LYMPHOCYTES 38 21 - 52 %    MONOCYTES 9 3 - 10 %    EOSINOPHILS 5 0 - 5 %    BASOPHILS 0 0 - 2 %    ABS. NEUTROPHILS 2.6 1.8 - 8.0 K/UL    ABS. LYMPHOCYTES 2.0 0.9 - 3.6 K/UL    ABS. MONOCYTES 0.5 0.05 - 1.2 K/UL    ABS. EOSINOPHILS 0.3 0.0 - 0.4 K/UL    ABS.  BASOPHILS 0.0 0.0 - 0.1 K/UL    DF AUTOMATED     METABOLIC PANEL, COMPREHENSIVE    Collection Time: 07/22/20 11:33 PM   Result Value Ref Range    Sodium 140 136 - 145 mmol/L    Potassium 3.6 3.5 - 5.5 mmol/L    Chloride 108 100 - 111 mmol/L    CO2 25 21 - 32 mmol/L    Anion gap 7 3.0 - 18 mmol/L    Glucose 286 (H) 74 - 99 mg/dL    BUN 32 (H) 7.0 - 18 MG/DL    Creatinine 1.53 (H) 0.6 - 1.3 MG/DL    BUN/Creatinine ratio 21 (H) 12 - 20      GFR est AA 53 (L) >60 ml/min/1.73m2    GFR est non-AA 44 (L) >60 ml/min/1.73m2    Calcium 8.9 8.5 - 10.1 MG/DL    Bilirubin, total 0.4 0.2 - 1.0 MG/DL    ALT (SGPT) 18 16 - 61 U/L    AST (SGOT) 16 10 - 38 U/L    Alk. phosphatase 84 45 - 117 U/L    Protein, total 6.6 6.4 - 8.2 g/dL    Albumin 3.4 3.4 - 5.0 g/dL    Globulin 3.2 2.0 - 4.0 g/dL    A-G Ratio 1.1 0.8 - 1.7     CARDIAC PANEL,(CK, CKMB & TROPONIN)    Collection Time: 07/22/20 11:33 PM   Result Value Ref Range    CK - MB 2.1 <3.6 ng/ml    CK-MB Index 2.0 0.0 - 4.0 %     39 - 308 U/L    Troponin-I, QT <0.02 0.0 - 0.045 NG/ML   TYPE & SCREEN    Collection Time: 07/22/20 11:33 PM   Result Value Ref Range    Crossmatch Expiration 07/25/2020     ABO/Rh(D) Francenia Laura POSITIVE     Antibody screen NEG    PROTHROMBIN TIME + INR    Collection Time: 07/22/20 11:33 PM   Result Value Ref Range    Prothrombin time 14.9 11.5 - 15.2 sec    INR 1.2 0.8 - 1.2     PTT    Collection Time: 07/22/20 11:33 PM   Result Value Ref Range    aPTT 28.6 23.0 - 36.4 SEC       Radiologic Studies -   NM ACUTE GI BLEED SCAN    (Results Pending)       Medical Decision Making   I am the first provider for this patient. I reviewed the vital signs, available nursing notes, past medical history, past surgical history, family history and social history. Vital Signs-Reviewed the patient's vital signs. Pulse Oximetry Analysis - 95% on RA     EKG interpretation: (Preliminary)  NSR at 90 bpm. LAD. No STEMI. Nonspecific T-wave abnormality. EKG read by Desirae Smith. Abdulaziz Clinton MD at 200     Records Reviewed: Nursing Notes, Old Medical Records, Previous Radiology Studies and Previous Laboratory Studies    Provider Notes (Medical Decision Making): Bleeding looks to be a lower GI diverticula. Unlikely to be affected by coagulopathy. Will try to image with scan. If nuc med study cannot be performed as we discovered for lack of radio-isotope.     PROCEDURES:  Procedures    MEDICATIONS GIVEN IN THE ED:  Medications   sodium chloride 0.9 % bolus infusion 1,000 mL (0 mL IntraVENous IV Completed 7/23/20 0227)   pantoprazole (PROTONIX) injection 80 mg (80 mg IntraVENous Given 7/23/20 0228)        ED COURSE:   12:52 AM   Initial assessment performed. CONSULT NOTE:  3:00 AM  Era Alonzo MD spoke with Lashawn Simms MD,  Specialty: GI  Discussed pt's hx, disposition, and available diagnostic and imaging results. Reviewed care plans. Consultant agrees with current management, and will be available to consult on the pt. Written by Stephani Guzman, ED Scribe, as dictated by Matilda Alonzo MD.      CONSULT NOTE:   3:15 AM  Era Alonzo MD, spoke with Sruthi Myles MD,   Specialty: Hospitalist  Discussed pt's hx, disposition, and available diagnostic and imaging results. Reviewed care plans. Consultant will admit the pt to MEDICAL. Written by Janna Cooper, ED Scribe, as dictated by Matilda Alonzo MD.         Diagnosis and Disposition     Critical Care Time: 3:15 AM  I have spent 33 minutes of critical care time involved in lab review, consultations with specialist, family decision-making, and documentation. During this entire length of time I was immediately available to the patient. Critical Care: The reason for providing this level of medical care for this critically ill patient was due a critical illness that impaired one or more vital organ systems such that there was a high probability of imminent or life threatening deterioration in the patients condition. This care involved high complexity decision making to assess, manipulate, and support vital system functions, to treat this degree vital organ system failure and to prevent further life threatening deterioration of the patients condition. Core Measures:  For Hospitalized Patients:    1. Hospitalization Decision Time:  The decision to hospitalize the patient was made by Era Alonzo MD at 3:00 AM on 7/22/2020    2.  Aspirin: Aspirin was not given because the patient is a bleeding risk    3:14 AM  Patient is being admitted to the hospital by Dash Murdock MD. The results of their tests and reasons for their admission have been discussed with them and/or available family. They convey agreement and understanding for the need to be admitted and for their admission diagnosis. CONDITIONS ON ADMISSION:  Sepsis is not present at the time of admission. Deep Vein Thrombosis is not present at the time of admission. Thrombosis is not present at the time of admission. Urinary Tract Infection is not present at the time of admission. Pneumonia is not present at the time of admission. MRSA is not present at the time of admission. Wound infection is not present at the time of admission. Pressure Ulcer is not present at the time of admission. CLINICAL IMPRESSION:  1. Lower GI bleed    2. Diverticulosis       PLAN:  1. ADMIT TO MEDICAL   _______________________________    Attestations: This note is prepared by Radha Garrison, acting as Scribe for Sarah. Rekha Cohn MD.    KimmyTrmiley. Rekha Cohn MD:  The scribe's documentation has been prepared under my direction and personally reviewed by me in its entirety.  I confirm that the note above accurately reflects all work, treatment, procedures, and medical decision making performed by me.  _______________________________

## 2020-07-23 NOTE — H&P
History & Physical    Patient: Wendi Perry MRN: [id number]  CSN: [id number]    YOB: 1940  Age: [de-identified] y.o. Sex: male      DOA: 7/22/2020    Chief Complaint:   Chief Complaint   Patient presents with    Rectal Bleeding          HPI:     Wendi Perry is a [de-identified] y.o.  male who has history of diverticulosis, diabetes, hypertension sleep apnea, and DVT presents to the emergency room with acute rectal bleeding that started at his doctor's office. Patient has had a total of 6 bloody loose stools denies straining constipation he does take an aspirin a day and drinks alcohol whiskey about 3 times a week in the emergency room his hemoglobin was found to be stable her bleeding scan was ordered and is pending at the time of this dictation  Past Medical History:   Diagnosis Date    Adverse effect of anesthesia     took long time coming out with back surgery done at [facility]. Thinks it was related to Fentanyl    Arthritis     Diabetes ([facility] [address] 1982    type 2    Diverticulitis     Gastrointestinal disorder     diverticulitis    GERD (gastroesophageal reflux disease)     no meds now, was on Nexium previously    Gout     Hypertension     Other ill-defined conditions(799.89) 2012    Diverticulitis, hospitalized x 4 days    Thromboembolus [facility])     behind left knee was on Xarelto stopped 8-1-15. On no anticoagulation now    Unspecified adverse effect of anesthesia     pt states he \"had trouble coming to, took too long\"    Unspecified sleep apnea     CPAP user.   Pt instructed to bring CPAP on DOS       Past Surgical History:   Procedure Laterality Date    COLONOSCOPY N/A 6/13/2016    COLONOSCOPY AND performed by Alison Mays MD at THE [facility] ENDOSCOPY    [address]X [address] [facility]     ORIF right ankle   [facility] SERVICES SURGERY  2002 & 2005 ,2012    HX CARPAL TUNNEL RELEASE      HX COLECTOMY      part of colon removed due to diverticulitis    HX HERNIA REPAIR      left Ing hernia repair x3    HX ORTHOPAEDIC  2010    left    HX ORTHOPAEDIC  2012    Rem. hardware right ankle    HX RETINAL DETACHMENT REPAIR  1996       Family History   Problem Relation Age of Onset    Malignant Hyperthermia Neg Hx        Social History     Socioeconomic History    Marital status:      Spouse name: Not on file    Number of children: Not on file    Years of education: Not on file    Highest education level: Not on file   Tobacco Use    Smoking status: Former Smoker     Packs/day: 0.25     Last attempt to quit: 2014     Years since quittin.5    Smokeless tobacco: Never Used   Substance and Sexual Activity    Alcohol use: No     Alcohol/week: 5.0 standard drinks     Types: 6 Standard drinks or equivalent per week     Comment: rare    Drug use: No       Prior to Admission medications    Medication Sig Start Date End Date Taking? Authorizing Provider   tamsulosin (Flomax) 0.4 mg capsule Take 0.4 mg by mouth daily. Yes Other, MD Marcia   albuterol (PROVENTIL HFA, VENTOLIN HFA, PROAIR HFA) 90 mcg/actuation inhaler Take 2 Puffs by inhalation daily as needed for Wheezing. Yes Other, MD Marcia   Magnesium Oxide 500 mg cap Take 1 Cap by mouth. Yes Other, MD Marcia   methocarbamol (ROBAXIN) 500 mg tablet Take 1 Tab by mouth two (2) times daily as needed (Musculoskeletal pain; muscle spasms). 19  Yes Chaparro Herrera, DO   polyethylene glycol (MIRALAX) 17 gram/dose powder Take 17 g by mouth daily as needed. Yes Provider, Historical   melatonin tab tablet Take 5 mg by mouth nightly. Yes Provider, Historical   metoprolol succinate (TOPROL-XL) 100 mg XL tablet Take 100 mg by mouth daily. Take with sip of water dos   Indications: HYPERTENSION   Yes Provider, Historical   telmisartan-hydrochlorothiazide (MICARDIS HCT) 80-12.5 mg per tablet Take 1 Tab by mouth daily. Take with sip of water dos  Indications: HYPERTENSION   Yes Other, MD Marcia   atorvastatin (LIPITOR) 10 mg tablet Take 10 mg by mouth nightly. Yes Other, MD Marcia   sitaGLIPtin (JANUVIA) 100 mg tablet Take 100 mg by mouth daily. Yes Victorina, MD Marcia   allopurinol (ZYLOPRIM) 100 mg tablet Take 100 mg by mouth two (2) times a day. Yes Victorina, MD Marcia   glyBURIDE-metFORMIN (GLUCOVANCE) 2.5-500 mg per tablet Take 1 Tab by mouth as needed. For Glucose >150 in the evening   Yes Victorina, MD Marcia   amLODIPine-valsartan (EXFORGE)  mg per tablet Take 1 Tab by mouth daily. Provider, Historical   carboxymethylcellulose sodium (REFRESH LIQUIGEL) 1 % dlgl Apply  to eye two (2) times a day. Indications: DRY EYE    Provider, Historical   multivitamin (ONE A DAY) tablet Take 1 Tab by mouth daily. Provider, Historical   aspirin delayed-release 81 mg tablet Take  by mouth daily. Provider, Historical   tadalafil (CIALIS) 5 mg tablet Take 5 mg by mouth as needed. Provider, Historical   testosterone (FORTESTA) 10 mg/0.5 gram /actuation glpm by TransDERmal route daily. Indications: 1 pump each thigh daily in am 10 mg    Provider, Historical   potassium chloride SR (KLOR-CON 8) 8 mEq tablet Take 8 mEq by mouth daily as needed. Indications: HYPOKALEMIA PREVENTION    Other, MD Marcia   furosemide (LASIX) 20 mg tablet Take 20 mg by mouth as needed. Indications: Edema    Other, MD Marcia       Allergies   Allergen Reactions    Norvasc [Amlodipine] Cough    Tolectin [Tolmetin] Hives         Review of Systems  GENERAL: Patient alert, awake and oriented times 3, able to communicate full sentences and not in distress. HEENT: No change in vision, no earache, tinnitus, sore throat or sinus congestion. NECK: No pain or stiffness. PULMONARY: No shortness of breath, cough or wheeze. Cardiovascular: no pnd or orthopnea, no CP  GASTROINTESTINAL: + abdominal pain, nausea, vomiting or diarrhea+melena+bright red blood per rectum. GENITOURINARY: No urinary frequency, urgency, hesitancy or dysuria.    MUSCULOSKELETAL: No joint or muscle pain, no back pain, no recent trauma. DERMATOLOGIC: No rash, no itching, no lesions. ENDOCRINE: No polyuria, polydipsia, no heat or cold intolerance. No recent change in weight. HEMATOLOGICAL: No anemia or easy bruising or bleeding. NEUROLOGIC: No headache, seizures, numbness, tingling or weakness. Physical Exam:     Physical Exam:  Visit Vitals  /75   Pulse 73   Temp 97.4 °F (36.3 °C)   Resp 17   Ht 5' 9\" (1.753 m)   Wt 90.7 kg (200 lb)   SpO2 100%   BMI 29.53 kg/m²      O2 Device: Room air    Temp (24hrs), Av.4 °F (36.3 °C), Min:97.4 °F (36.3 °C), Max:97.4 °F (36.3 °C)    1901 -  0700  In: 1000 [I.V.:1000]  Out: -    No intake/output data recorded. General:  Alert, cooperative, no distress, appears stated age. Head: Normocephalic, without obvious abnormality, atraumatic. Eyes:  Conjunctivae/corneas clear. PERRL, EOMs intact. Nose: Nares normal. No drainage or sinus tenderness. Neck: Supple, symmetrical, trachea midline, no adenopathy, thyroid: no enlargement, no carotid bruit and no JVD. Lungs:   Clear to auscultation bilaterally. Heart:  Regular rate and rhythm, S1, S2 normal.     Abdomen: Left lower quadrant pain. Bowel sounds normal.  Hernia is evident on the left side   Extremities: Extremities normal, atraumatic, no cyanosis or edema. Pulses: 2+ and symmetric all extremities. Skin:  No rashes or lesions pression stockings   Neurologic: , No focal motor or sensory deficit. Labs Reviewed: All lab results for the last 24 hours reviewed. and EKG    Procedures/imaging: see electronic medical records for all procedures/Xrays and details which were not copied into this note but were reviewed prior to creation of Plan      Assessment/Plan     Active Problems:    * No active hospital problems. *  1.   Lower GI bleed likely diverticular in origin hopefully this will stop on its own he is placed on a clear liquid diet will obtain serial H&H's he is typed and screened his hemoglobin is fairly stable will transfuse if his hemoglobin drops below 7 or he becomes symptomatic GI is already consulted a bleeding scan is pending  2. Diabetes all oral agents will be held he will be placed on a sliding scale insulin and since he is on a clear diet long-acting insulin will be held  3. Sleep apnea patient will be prescribed his  Nocturnal CPAP  4. Hypertension we will resume his Toprol and Benicar  5. History of DVT in the past he will be placed on SCDs all anticoagulation including aspirin is held  DVT/GI Prophylaxis: Hep SQ and SCD's    Discussed with patient at bedside about hospital admission and my plan care, who understood and agree with my plan care.     [facility], MD  7/23/2020 3:14 AM

## 2020-07-23 NOTE — ROUTINE PROCESS
0700: received bedside shift report from Wendi Escobedo RN (offgoing nurse) and assumed care of the pt. Updated white board, assessed all current needs, left bed in lowest position with wheels locked and call light within reach. 0730: pt being transported down to nuclear medicine for a gi bleed scan.    1400: pt let me know that he was experiencing itching at his IV site towards the end of the transfusion. The site was flushed and the pt reports no other additional discomfort at the site. When asked about itching anywhere else, swelling, SOB, flank pain, etc. Pt denies other discomforts. A perfect serve text has been placed out to Dr. Randa Jackson to let her know and see how she would like to proceed. 1700: ciprofloxacin D/Colby by Dr. Randa Jackson    1900: Shift summary, shift uneventful, no complaints of chest pain or shortness of breath.      Michelle Cuadra RN

## 2020-07-23 NOTE — ED TRIAGE NOTES
Pt co bloody stools x 3 since this afternoon. Pt reports his second BM seemed to have less blood, but his last one had large amounts of dark blood, and clots. Pt denies CP, SOB, dizziness, lightheadedness, weakness.  Pt has hx diverticulosis, and has needed blood transfusions in the past.

## 2020-07-23 NOTE — ROUTINE PROCESS
Bedside shift change report given to Estrella Hearn RN (oncoming nurse) by Elisabeth Ruiz RN (offgoing nurse). Report included the following information SBAR, Kardex, Intake/Output, MAR and Cardiac Rhythm SR.      Visit Vitals  /66 (BP 1 Location: Left arm, BP Patient Position: Sitting)   Pulse 71   Temp 97.6 °F (36.4 °C)   Resp 16   Ht 5' 9\" (1.753 m)   Wt 90.7 kg (200 lb)   SpO2 97%   BMI 29.53 kg/m²     Elisabeth Ruiz RN

## 2020-07-23 NOTE — PROGRESS NOTES
Hospitalist Progress Note    Patient: Yessi Negrete MRN: [id number]  CSN: [id number]    YOB: 1940  Age: [de-identified] y.o. Sex: male    DOA: 7/22/2020 LOS:  LOS: 0 days          Chief Complaint:    GI bleed      Assessment/Plan     Lower GI bleed likely diverticular  Hx of partial colectomy for severe diverticulitis  Diabetes type 2 non insulin dep  Sleep apnea   Hypertension   History of DVT   CHEY    Hold micardis, stop HCTZ    Daily BMP    Bleed scan is negative    Watch H&H every 8 hrs    GI consult from ER    Add empiric IV abx     Admitted this am    DVT/GI Prophylaxis: SCD's  Disposition :  Patient Active Problem List   Diagnosis Code    GI bleed K92.2    DM (diabetes mellitus) ([facility] [address] E11.9    Essential hypertension, malignant I10    Diverticulosis of colon without diverticulitis K57.30    Surgery, elective Z41.9    HTN (hypertension), benign I10    Lumbar stenosis with neurogenic claudication M48.062    Lumbar stenosis M48.061    Diverticulosis K57.90    Lower GI bleed K92.2    Sleep apnea G47.30       Subjective:    Denies any BM since last night  Left lower abd pain with pressure applied  No N/V    Review of systems:    Constitutional: denies fevers, chills  Respiratory: denies SOB  Cardiovascular: denies chest pain  Gastrointestinal: denies nausea, vomiting, diarrhea      Vital signs/Intake and Output:  Visit Vitals  /74 (BP 1 Location: Left arm, BP Patient Position: Supine)   Pulse 77   Temp 97.5 °F (36.4 °C)   Resp 16   Ht 5' 9\" (1.753 m)   Wt 90.7 kg (200 lb)   SpO2 100%   BMI 29.53 kg/m²     Current Shift:  No intake/output data recorded.   Last three shifts:  07/21 1901 - 07/23 0700  In: 1000 [I.V.:1000]  Out: 300 [Urine:300]    Exam:    General: Well developed, alert, NAD, OX3  Head/Neck: NCAT, supple, No masses, No lymphadenopathy  CVS:Regular rate and rhythm, no M/R/G, S1/S2 heard, no thrill  Lungs:Clear to auscultation bilaterally, no wheezes, rhonchi, or rales  Abdomen: Soft, mild tenderness LLQ, No distention, Normal Bowel sounds, No hepatomegaly  Extremities: No C/C/E, pulses palpable 2+  Neuro:grossly normal , follows commands  Psych:appropriate                Labs: Results:       Chemistry Recent Labs     07/22/20 2333   *      K 3.6      CO2 25   BUN 32*   CREA 1.53*   CA 8.9   AGAP 7   BUCR 21*   AP 84   TP 6.6   ALB 3.4   GLOB 3.2   AGRAT 1.1      CBC w/Diff Recent Labs     07/23/20  0545 07/22/20 2333   WBC 5.9 5.3   RBC 3.51* 4.24*   HGB 12.0* 14.3   HCT 35.5* 42.8    222   GRANS 54 48   LYMPH 34 38   EOS 3 5      Cardiac Enzymes Recent Labs     07/22/20 2333      CKND1 2.0      Coagulation Recent Labs     07/22/20 2333   PTP 14.9   INR 1.2   APTT 28.6       Lipid Panel No results found for: CHOL, CHOLPOCT, CHOLX, CHLST, CHOLV, 410862, HDL, HDLP, LDL, LDLC, DLDLP, 786455, VLDLC, VLDL, TGLX, TRIGL, TRIGP, TGLPOCT, CHHD, CHHDX   BNP No results for input(s): BNPP in the last 72 hours.    Liver Enzymes Recent Labs     07/22/20 2333   TP 6.6   ALB 3.4   AP 84      Thyroid Studies No results found for: T4, T3U, TSH, TSHEXT     Procedures/imaging: see electronic medical records for all procedures/Xrays and details which were not copied into this note but were reviewed prior to creation of Winston Santiago MD

## 2020-07-23 NOTE — PROGRESS NOTES
Reason for Admission:   GI Bleed                   RUR Score:   14%                  Plan for utilizing home health:  TBD        PCP: First and Last name:  Peewee Espinoza   Name of Practice:    Are you a current patient: Yes/No: Yes   Approximate date of last visit:  7/22/2020   Can you participate in a virtual visit with your PCP:                     Current Advanced Directive/Advance Care Plan:                          Transition of Care Plan:    Home with physician follow up vs [facility] and physician follow up                   Chart reviewed. Per H&P \"López Ding is a [de-identified] y.o.  male who has history of diverticulosis, diabetes, hypertension sleep apnea, and DVT presents to the emergency room with acute rectal bleeding that started at his doctor's office. Patient has had a total of 6 bloody loose stools denies straining constipation he does take an aspirin a day and drinks alcohol whiskey about 3 times a week in the emergency room his hemoglobin was found to be stable her bleeding scan was ordered and is pending at the time of this dictation. \"      CM met with pt at bedside to discuss transition of care. Pt lives alone and has friends in the area to assist as needed. Pt drove himself to the hospital and his care is in the [facility] parking lot. Pt has indicated he uses a cane to ambulate. Pt has access to a RW and a chair lift in the home, however, he prefers to ambulate his stairs on his own. Pt has been ambulating in the room independently. Pt with a therapy consult pending. Pt does not feel he needs anything at this point (ie HH). Anticipate pt will transition home with physician follow up with in the next 24-48 hours with physician follow up. Pt has indicated he has an appointment with his PCP, Peewee Espinoza, 8/6/2020. CMS has been notified. CM to continue to follow and assist.    Care Management Interventions  PCP Verified by CM:  Yes  Mode of Transport at Discharge: Self  Transition of Care Consult (CM Consult): Discharge Planning  Health Maintenance Reviewed: Yes  Physical Therapy Consult: Yes  Current Support Network: Family Lives Nearby  Confirm Follow Up Transport: Self  The Plan for Transition of Care is Related to the Following Treatment Goals : Home with physician follow up   Discharge Location  Discharge Placement: Home

## 2020-07-24 ENCOUNTER — APPOINTMENT (OUTPATIENT)
Dept: CT IMAGING | Age: 80
DRG: 377 | End: 2020-07-24
Attending: HOSPITALIST
Payer: MEDICARE

## 2020-07-24 ENCOUNTER — APPOINTMENT (OUTPATIENT)
Dept: NUCLEAR MEDICINE | Age: 80
DRG: 377 | End: 2020-07-24
Attending: HOSPITALIST
Payer: MEDICARE

## 2020-07-24 LAB
ABO + RH BLD: NORMAL
ALBUMIN SERPL-MCNC: 2.6 G/DL (ref 3.4–5)
ALBUMIN SERPL-MCNC: 2.7 G/DL (ref 3.4–5)
ALBUMIN/GLOB SERPL: 1.1 {RATIO} (ref 0.8–1.7)
ALBUMIN/GLOB SERPL: 1.1 {RATIO} (ref 0.8–1.7)
ALP SERPL-CCNC: 54 U/L (ref 45–117)
ALP SERPL-CCNC: 58 U/L (ref 45–117)
ALT SERPL-CCNC: 12 U/L (ref 16–61)
ALT SERPL-CCNC: 16 U/L (ref 16–61)
AMMONIA PLAS-SCNC: 21 UMOL/L (ref 11–32)
ANION GAP SERPL CALC-SCNC: 13 MMOL/L (ref 3–18)
ANION GAP SERPL CALC-SCNC: 3 MMOL/L (ref 3–18)
ARTERIAL PATENCY WRIST A: YES
AST SERPL-CCNC: 12 U/L (ref 10–38)
AST SERPL-CCNC: 14 U/L (ref 10–38)
ATRIAL RATE: 90 BPM
BASE DEFICIT BLD-SCNC: 7 MMOL/L
BASOPHILS # BLD: 0 K/UL (ref 0–0.1)
BASOPHILS NFR BLD: 0 % (ref 0–2)
BDY SITE: ABNORMAL
BILIRUB SERPL-MCNC: 0.5 MG/DL (ref 0.2–1)
BILIRUB SERPL-MCNC: 0.6 MG/DL (ref 0.2–1)
BLD PROD TYP BPU: NORMAL
BLD PROD TYP BPU: NORMAL
BLOOD GROUP ANTIBODIES SERPL: NORMAL
BODY TEMPERATURE: 98.1
BPU ID: NORMAL
BPU ID: NORMAL
BUN SERPL-MCNC: 24 MG/DL (ref 7–18)
BUN SERPL-MCNC: 25 MG/DL (ref 7–18)
BUN/CREAT SERPL: 18 (ref 12–20)
BUN/CREAT SERPL: 19 (ref 12–20)
CALCIUM SERPL-MCNC: 7.7 MG/DL (ref 8.5–10.1)
CALCIUM SERPL-MCNC: 8.2 MG/DL (ref 8.5–10.1)
CALCULATED P AXIS, ECG09: 39 DEGREES
CALCULATED R AXIS, ECG10: -40 DEGREES
CALCULATED T AXIS, ECG11: 35 DEGREES
CALLED TO:,BCALL1: NORMAL
CHLORIDE SERPL-SCNC: 110 MMOL/L (ref 100–111)
CHLORIDE SERPL-SCNC: 112 MMOL/L (ref 100–111)
CO2 SERPL-SCNC: 21 MMOL/L (ref 21–32)
CO2 SERPL-SCNC: 29 MMOL/L (ref 21–32)
CREAT SERPL-MCNC: 1.29 MG/DL (ref 0.6–1.3)
CREAT SERPL-MCNC: 1.4 MG/DL (ref 0.6–1.3)
CROSSMATCH RESULT,%XM: NORMAL
CROSSMATCH RESULT,%XM: NORMAL
DIAGNOSIS, 93000: NORMAL
DIFFERENTIAL METHOD BLD: ABNORMAL
EOSINOPHIL # BLD: 0.4 K/UL (ref 0–0.4)
EOSINOPHIL NFR BLD: 7 % (ref 0–5)
ERYTHROCYTE [DISTWIDTH] IN BLOOD BY AUTOMATED COUNT: 13.8 % (ref 11.6–14.5)
GAS FLOW.O2 O2 DELIVERY SYS: ABNORMAL L/MIN
GAS FLOW.O2 SETTING OXYMISER: 6 L/M
GLOBULIN SER CALC-MCNC: 2.4 G/DL (ref 2–4)
GLOBULIN SER CALC-MCNC: 2.4 G/DL (ref 2–4)
GLUCOSE BLD STRIP.AUTO-MCNC: 129 MG/DL (ref 70–110)
GLUCOSE BLD STRIP.AUTO-MCNC: 156 MG/DL (ref 70–110)
GLUCOSE BLD STRIP.AUTO-MCNC: 279 MG/DL (ref 70–110)
GLUCOSE SERPL-MCNC: 265 MG/DL (ref 74–99)
GLUCOSE SERPL-MCNC: 86 MG/DL (ref 74–99)
HCO3 BLD-SCNC: 17.4 MMOL/L (ref 22–26)
HCT VFR BLD AUTO: 24.1 % (ref 36–48)
HCT VFR BLD AUTO: 27.3 % (ref 36–48)
HCT VFR BLD AUTO: 28.6 % (ref 36–48)
HCT VFR BLD AUTO: 28.7 % (ref 36–48)
HCT VFR BLD AUTO: 30.6 % (ref 36–48)
HGB BLD-MCNC: 10.4 G/DL (ref 13–16)
HGB BLD-MCNC: 8 G/DL (ref 13–16)
HGB BLD-MCNC: 9.2 G/DL (ref 13–16)
HGB BLD-MCNC: 9.5 G/DL (ref 13–16)
HGB BLD-MCNC: 9.7 G/DL (ref 13–16)
LYMPHOCYTES # BLD: 2.1 K/UL (ref 0.9–3.6)
LYMPHOCYTES NFR BLD: 39 % (ref 21–52)
MAGNESIUM SERPL-MCNC: 1.8 MG/DL (ref 1.6–2.6)
MCH RBC QN AUTO: 34.3 PG (ref 24–34)
MCHC RBC AUTO-ENTMCNC: 34 G/DL (ref 31–37)
MCV RBC AUTO: 101 FL (ref 74–97)
MONOCYTES # BLD: 0.5 K/UL (ref 0.05–1.2)
MONOCYTES NFR BLD: 10 % (ref 3–10)
NEUTS SEG # BLD: 2.3 K/UL (ref 1.8–8)
NEUTS SEG NFR BLD: 44 % (ref 40–73)
O2/TOTAL GAS SETTING VFR VENT: 0.44 %
P-R INTERVAL, ECG05: 166 MS
PCO2 BLD: 27 MMHG (ref 35–45)
PH BLD: 7.42 [PH] (ref 7.35–7.45)
PLATELET # BLD AUTO: 167 K/UL (ref 135–420)
PMV BLD AUTO: 10.5 FL (ref 9.2–11.8)
PO2 BLD: 89 MMHG (ref 80–100)
POTASSIUM SERPL-SCNC: 3.6 MMOL/L (ref 3.5–5.5)
POTASSIUM SERPL-SCNC: 3.8 MMOL/L (ref 3.5–5.5)
PROT SERPL-MCNC: 5 G/DL (ref 6.4–8.2)
PROT SERPL-MCNC: 5.1 G/DL (ref 6.4–8.2)
Q-T INTERVAL, ECG07: 338 MS
QRS DURATION, ECG06: 88 MS
QTC CALCULATION (BEZET), ECG08: 413 MS
RBC # BLD AUTO: 3.03 M/UL (ref 4.7–5.5)
SAO2 % BLD: 97 % (ref 92–97)
SERVICE CMNT-IMP: ABNORMAL
SODIUM SERPL-SCNC: 144 MMOL/L (ref 136–145)
SODIUM SERPL-SCNC: 144 MMOL/L (ref 136–145)
SPECIMEN EXP DATE BLD: NORMAL
SPECIMEN TYPE: ABNORMAL
STATUS OF UNIT,%ST: NORMAL
STATUS OF UNIT,%ST: NORMAL
TOTAL RESP. RATE, ITRR: 20
UNIT DIVISION, %UDIV: 0
UNIT DIVISION, %UDIV: 0
VENTRICULAR RATE, ECG03: 90 BPM
WBC # BLD AUTO: 5.3 K/UL (ref 4.6–13.2)

## 2020-07-24 PROCEDURE — 36600 WITHDRAWAL OF ARTERIAL BLOOD: CPT

## 2020-07-24 PROCEDURE — 85025 COMPLETE CBC W/AUTO DIFF WBC: CPT

## 2020-07-24 PROCEDURE — 74011250637 HC RX REV CODE- 250/637: Performed by: INTERNAL MEDICINE

## 2020-07-24 PROCEDURE — 82962 GLUCOSE BLOOD TEST: CPT

## 2020-07-24 PROCEDURE — 65610000006 HC RM INTENSIVE CARE

## 2020-07-24 PROCEDURE — 74174 CTA ABD&PLVS W/CONTRAST: CPT

## 2020-07-24 PROCEDURE — 82140 ASSAY OF AMMONIA: CPT

## 2020-07-24 PROCEDURE — 82803 BLOOD GASES ANY COMBINATION: CPT

## 2020-07-24 PROCEDURE — 30233N1 TRANSFUSION OF NONAUTOLOGOUS RED BLOOD CELLS INTO PERIPHERAL VEIN, PERCUTANEOUS APPROACH: ICD-10-PCS | Performed by: HOSPITALIST

## 2020-07-24 PROCEDURE — 74011250637 HC RX REV CODE- 250/637: Performed by: HOSPITALIST

## 2020-07-24 PROCEDURE — 74011250636 HC RX REV CODE- 250/636: Performed by: INTERNAL MEDICINE

## 2020-07-24 PROCEDURE — 36415 COLL VENOUS BLD VENIPUNCTURE: CPT

## 2020-07-24 PROCEDURE — 80053 COMPREHEN METABOLIC PANEL: CPT

## 2020-07-24 PROCEDURE — 74011000250 HC RX REV CODE- 250: Performed by: FAMILY MEDICINE

## 2020-07-24 PROCEDURE — 86900 BLOOD TYPING SEROLOGIC ABO: CPT

## 2020-07-24 PROCEDURE — 36430 TRANSFUSION BLD/BLD COMPNT: CPT

## 2020-07-24 PROCEDURE — 86923 COMPATIBILITY TEST ELECTRIC: CPT

## 2020-07-24 PROCEDURE — P9016 RBC LEUKOCYTES REDUCED: HCPCS

## 2020-07-24 PROCEDURE — 74011000258 HC RX REV CODE- 258: Performed by: HOSPITALIST

## 2020-07-24 PROCEDURE — 74011636637 HC RX REV CODE- 636/637: Performed by: HOSPITALIST

## 2020-07-24 PROCEDURE — 74011636320 HC RX REV CODE- 636/320: Performed by: INTERNAL MEDICINE

## 2020-07-24 PROCEDURE — 74011250636 HC RX REV CODE- 250/636: Performed by: HOSPITALIST

## 2020-07-24 PROCEDURE — 74011000250 HC RX REV CODE- 250: Performed by: INTERNAL MEDICINE

## 2020-07-24 PROCEDURE — C9113 INJ PANTOPRAZOLE SODIUM, VIA: HCPCS | Performed by: INTERNAL MEDICINE

## 2020-07-24 PROCEDURE — 85018 HEMOGLOBIN: CPT

## 2020-07-24 PROCEDURE — 74011250637 HC RX REV CODE- 250/637: Performed by: FAMILY MEDICINE

## 2020-07-24 PROCEDURE — 83735 ASSAY OF MAGNESIUM: CPT

## 2020-07-24 PROCEDURE — 70450 CT HEAD/BRAIN W/O DYE: CPT

## 2020-07-24 RX ORDER — SODIUM CHLORIDE 9 MG/ML
75 INJECTION, SOLUTION INTRAVENOUS CONTINUOUS
Status: DISCONTINUED | OUTPATIENT
Start: 2020-07-24 | End: 2020-07-24

## 2020-07-24 RX ORDER — SODIUM CHLORIDE 9 MG/ML
1000 INJECTION, SOLUTION INTRAVENOUS CONTINUOUS
Status: CANCELLED | OUTPATIENT
Start: 2020-07-24

## 2020-07-24 RX ORDER — ALLOPURINOL 100 MG/1
100 TABLET ORAL 2 TIMES DAILY
Status: DISCONTINUED | OUTPATIENT
Start: 2020-07-25 | End: 2020-07-27 | Stop reason: HOSPADM

## 2020-07-24 RX ORDER — SODIUM CHLORIDE 0.9 % (FLUSH) 0.9 %
5-40 SYRINGE (ML) INJECTION AS NEEDED
Status: CANCELLED | OUTPATIENT
Start: 2020-07-24

## 2020-07-24 RX ORDER — SODIUM CHLORIDE 9 MG/ML
250 INJECTION, SOLUTION INTRAVENOUS AS NEEDED
Status: DISCONTINUED | OUTPATIENT
Start: 2020-07-24 | End: 2020-07-27 | Stop reason: SDUPTHER

## 2020-07-24 RX ORDER — LIDOCAINE 4 G/100G
1 PATCH TOPICAL EVERY 24 HOURS
Status: DISCONTINUED | OUTPATIENT
Start: 2020-07-24 | End: 2020-07-27 | Stop reason: HOSPADM

## 2020-07-24 RX ORDER — DIPHENHYDRAMINE HYDROCHLORIDE 50 MG/ML
50 INJECTION, SOLUTION INTRAMUSCULAR; INTRAVENOUS ONCE
Status: CANCELLED | OUTPATIENT
Start: 2020-07-24 | End: 2020-07-24

## 2020-07-24 RX ORDER — ACETAMINOPHEN 500 MG
500 TABLET ORAL
Status: DISCONTINUED | OUTPATIENT
Start: 2020-07-24 | End: 2020-07-27 | Stop reason: HOSPADM

## 2020-07-24 RX ORDER — ACETAMINOPHEN 500 MG
500 TABLET ORAL
Status: DISCONTINUED | OUTPATIENT
Start: 2020-07-24 | End: 2020-07-24

## 2020-07-24 RX ORDER — DEXTROSE MONOHYDRATE AND SODIUM CHLORIDE 5; .45 G/100ML; G/100ML
75 INJECTION, SOLUTION INTRAVENOUS CONTINUOUS
Status: DISCONTINUED | OUTPATIENT
Start: 2020-07-24 | End: 2020-07-25

## 2020-07-24 RX ORDER — EPINEPHRINE 0.1 MG/ML
1 INJECTION INTRACARDIAC; INTRAVENOUS
Status: CANCELLED | OUTPATIENT
Start: 2020-07-24 | End: 2020-07-25

## 2020-07-24 RX ORDER — SODIUM CHLORIDE 9 MG/ML
1000 INJECTION, SOLUTION INTRAVENOUS CONTINUOUS
Status: CANCELLED | OUTPATIENT
Start: 2020-07-24 | End: 2020-07-24

## 2020-07-24 RX ORDER — DEXTROMETHORPHAN/PSEUDOEPHED 2.5-7.5/.8
1.2 DROPS ORAL
Status: CANCELLED | OUTPATIENT
Start: 2020-07-24

## 2020-07-24 RX ORDER — SODIUM CHLORIDE 0.9 % (FLUSH) 0.9 %
5-40 SYRINGE (ML) INJECTION EVERY 8 HOURS
Status: CANCELLED | OUTPATIENT
Start: 2020-07-24

## 2020-07-24 RX ORDER — ATROPINE SULFATE 0.1 MG/ML
0.5 INJECTION INTRAVENOUS
Status: CANCELLED | OUTPATIENT
Start: 2020-07-24 | End: 2020-07-25

## 2020-07-24 RX ORDER — CARBOXYMETHYLCELLULOSE SODIUM 5 MG/ML
1 SOLUTION/ DROPS OPHTHALMIC 2 TIMES DAILY
Status: DISCONTINUED | OUTPATIENT
Start: 2020-07-24 | End: 2020-07-27 | Stop reason: HOSPADM

## 2020-07-24 RX ADMIN — Medication 1 TABLET: at 09:40

## 2020-07-24 RX ADMIN — INSULIN LISPRO 6 UNITS: 100 INJECTION, SOLUTION INTRAVENOUS; SUBCUTANEOUS at 23:24

## 2020-07-24 RX ADMIN — METOPROLOL SUCCINATE 100 MG: 100 TABLET, EXTENDED RELEASE ORAL at 09:40

## 2020-07-24 RX ADMIN — SODIUM CHLORIDE 100 ML/HR: 900 INJECTION, SOLUTION INTRAVENOUS at 12:11

## 2020-07-24 RX ADMIN — METRONIDAZOLE 500 MG: 500 INJECTION, SOLUTION INTRAVENOUS at 12:06

## 2020-07-24 RX ADMIN — DEXTROSE MONOHYDRATE AND SODIUM CHLORIDE 75 ML/HR: 5; .45 INJECTION, SOLUTION INTRAVENOUS at 23:05

## 2020-07-24 RX ADMIN — POLYETHYLENE GLYCOL 3350, SODIUM CHLORIDE, POTASSIUM CHLORIDE, SODIUM BICARBONATE, AND SODIUM SULFATE 4000 ML: 240; 5.84; 2.98; 6.72; 22.72 POWDER, FOR SOLUTION ORAL at 13:26

## 2020-07-24 RX ADMIN — SODIUM CHLORIDE 1000 ML: 900 INJECTION, SOLUTION INTRAVENOUS at 10:37

## 2020-07-24 RX ADMIN — PANTOPRAZOLE SODIUM 40 MG: 40 TABLET, DELAYED RELEASE ORAL at 09:40

## 2020-07-24 RX ADMIN — Medication 10 ML: at 23:25

## 2020-07-24 RX ADMIN — ALLOPURINOL 100 MG: 100 TABLET ORAL at 09:40

## 2020-07-24 RX ADMIN — METRONIDAZOLE 500 MG: 500 INJECTION, SOLUTION INTRAVENOUS at 23:26

## 2020-07-24 RX ADMIN — ACETAMINOPHEN 500 MG: 500 TABLET ORAL at 23:19

## 2020-07-24 RX ADMIN — POLYETHYLENE GLYCOL 3350 17 G: 17 POWDER, FOR SOLUTION ORAL at 09:41

## 2020-07-24 RX ADMIN — METRONIDAZOLE 500 MG: 500 INJECTION, SOLUTION INTRAVENOUS at 04:09

## 2020-07-24 RX ADMIN — Medication 1 TABLET: at 23:03

## 2020-07-24 RX ADMIN — ATORVASTATIN CALCIUM 10 MG: 20 TABLET, FILM COATED ORAL at 23:02

## 2020-07-24 RX ADMIN — IOPAMIDOL 100 ML: 755 INJECTION, SOLUTION INTRAVENOUS at 11:25

## 2020-07-24 RX ADMIN — SODIUM CHLORIDE 40 MG: 9 INJECTION, SOLUTION INTRAMUSCULAR; INTRAVENOUS; SUBCUTANEOUS at 23:01

## 2020-07-24 RX ADMIN — ACETYLCYSTEINE 13600 MG: 200 INJECTION, SOLUTION INTRAVENOUS at 13:40

## 2020-07-24 NOTE — PROGRESS NOTES
Chart reviewed. Pt admitted to tele by hospitalist. Pt noted to of had RRT due to syncope earlier today. No dc order noted for today. Bedrest order noted. GI following. Per PT notes pt has cleared PT with no recommendations for [facility] or DME. Therapy please advise if recommendations change considering RRT today. Per previous CM notes pt lives alone. Pt has cane, RW. Pts PCP confirmed as MD Ana Bocanegra. No anticipated dc concerns identified. CM will cont to follow for transition of care needs [phone] or via hospital  on weekends.

## 2020-07-24 NOTE — ROUTINE PROCESS
RRT called. Pt became unresponsive while on toilet. Code cart moved to room. Pt alert and oriented on my arrival to room. Assisted RNs with moving pt back to bed. Blood drawn (lyle) and and handed off to lab. No further assistance needed from Medic.

## 2020-07-24 NOTE — PROGRESS NOTES
Hospital Res-Med CPAP with full face mask setup and placed on patient. SP02 97%, HR 78. Patient knows how to remove hospital CPAP mask if needed.

## 2020-07-24 NOTE — ROUTINE PROCESS
TRANSFER - OUT REPORT:    Verbal report given to Ilda Beebe RN(name) on Marivel Kraft  being transferred to ICU(unit) for urgent transfer       Report consisted of patients Situation, Background, Assessment and   Recommendations(SBAR). Information from the following report(s) SBAR, Kardex, Intake/Output and MAR was reviewed with the receiving nurse. Lines:   Peripheral IV 07/24/20 Left Hand (Active)       Peripheral IV 07/24/20 Right Antecubital (Active)        Opportunity for questions and clarification was provided.       Patient transported with:   Registered Nurse

## 2020-07-24 NOTE — PROGRESS NOTES
[de-identified] yo with DM with CKD Creat 1.53, spinal stenosis  was well known to have History of multiple  Episodes of lower GI bleeding attributed to colonic diverticulosis that started at least since 2012. In 2015 he had 33.5 cm sigmoid resection for acute on chronic diverticulitis by Dr Jeffry Mccoy. He had also hx of colonic polyps but this time his bleeding is most likely diverticular from the right colon. GI bleeding scan was negative. Last colonoscopy done by Dr Jeffry Mccoy 6/13/ 2016 to the cecum anastomosis but no polyps  Or diverticula found  I would do tomorrow afternoon a colonoscopy. He can start the bowel prep tomorrow am  He also had hx of GERD with erosive esophagitis last EGD 2/6/ 2017 by Dr Jeffry Mccoy, but according to his list of medication he was not on PPI at home? His BUN was 32 on admission raising the possibility of UGI bleeding too? He has acute blood loss anemia to 11.7 but hemodynamically stable. Presently started on Pantoprazole.

## 2020-07-24 NOTE — PROGRESS NOTES
IV to PO Conversion Recommendation     Patient: Donna Verde   MRN#: [id number]   Admission Date: 165846     Pharmacy Dosing Services:  Summary:   Does the patient meet all criteria for IV to PO switch as listed below? No    If no, list: Clear liquid diet   Is the patient excluded from IV to PO automatic switch based on criteria listed below? No   If yes, list: GI bleed diagnosis     Criteria for IV to PO switch - Antibiotics   Received IV therapy for at least 24 hours   2. Functioning GI tract   Taking scheduled oral medications  Tolerating diet more advanced than clear liquids   3. No signs/symptoms of shock  No vasopressor support    Criteria for IV to PO switch - Nonantibiotics   Functioning GI tract   Taking scheduled oral medications  Toleratind duet more advanced than clear liquids  2. No signs/symptoms of shock  No vasopressor support        3. No seizures for >72 hours (antiepileptic medications only)    Exclusion Criteria   Patient is being treated for an infection that requires IV therapy such as: endocarditis, osteomyelitis, meningitis, pancreatitis (antibiotics only)   NG tube with continous suction   Nausea and/or vomiting or severe diarrhea within past 24 hours  Malabsorption syndromes, partial or total gastrectomy, ileus, gastric outlet or bowel obstruction  Active GI bleeding   Significant painful oral ulceration  Unable to swallow  On total parenteral nutrition with a NPO order  NPO  Febrile in last 24 hours (antibiotics only)  Clinically deteriorating or unstable (antibiotics only)    Oral Meds  Yes   Diet:     Active Orders   Diet    DIET CLEAR LIQUID    DIET CLEAR LIQUID      TEMP 98.2 °F (36.8 °C)   WBC Lab Results   Component Value Date/Time    WBC 5.3 07/24/2020 03:00 AM         Assessment/Recommendation:    Pantoprazole 40 mg PO BIDAC changed to 20 mg IV Q12H    Please call with questions.     Arin Brewer  Pharmacist  [phone]

## 2020-07-24 NOTE — PROGRESS NOTES
CT scan angio did not show any bleeding. The patient is presently weak and confused apparently he had a syncopal or presyncopal episodes x 2 where he fell. No active bleeding now since this noon but Hgb dropped to 8. He is disoriented x 3. But follow command moves all his 4 extremities. PERRLA. Cranial nerve assessment is ok. He was going to the CT scan for CT of the head but apparently he passed out while transferring him on the table RRT was called. He is going to the ICU for monitoring and to get blood transfusion. Later I may have to come to scope himto find where the bleeding is coming from. Having a significant bleeding we have always to suspect an upper GI bleeding. Beside the baby ASA he denied taking any NSAID's and has been taking Nexium 20 mg twice a week for mild rare heartburns.  His latest vital signs were stable

## 2020-07-24 NOTE — PROGRESS NOTES
Hospitalist Progress Note    Patient: Rosanna Garrison MRN: [id number]  [facility]: [id number]    YOB: 1940  Age: [de-identified] y.o. Sex: male    DOA: 7/22/2020 LOS:  LOS: 1 day            Mr Adryan Peterson climbed out of bed, walked down the rodriguez, almost fell when nurses caught him, and bled from his rectum all down the rodriguez. He is now encephalopathic, but without neuro deficits. He is confused and asking what we are doing to him. I have ordered a type and cross, and 2 units stat PRBC. Stat head CT ordered also.  Transfer to ICU    He may not be able to undergo colonoscopy due to his delerium    Check stat ammonia, H&H, BMP, ABG, and continue IVF hydration    PPI IV Q12H    guarded prognosis          Patient Active Problem List   Diagnosis Code    GI bleed K92.2    DM (diabetes mellitus) ([facility] [address] E11.9    Essential hypertension, malignant I10    Diverticulosis of colon without diverticulitis K57.30    Surgery, elective Z41.9    HTN (hypertension), benign I10    Lumbar stenosis with neurogenic claudication M48.062    Lumbar stenosis M48.061    Diverticulosis K57.90    Lower GI bleed K92.2    Sleep apnea G47.30         Wadie Cranker, MD

## 2020-07-24 NOTE — PROGRESS NOTES
Hospitalist Progress Note    Patient: Cesilia Palmer MRN: [id number]  CSN: [id number]    YOB: 1940  Age: [de-identified] y.o. Sex: male    DOA: 7/22/2020 LOS:  LOS: 1 day          Chief Complaint:    GI bleeding      Assessment/Plan   RRT called due to near syncope in toilet with large bloody watery BM     Lower GI bleed likely diverticular  Ac blood loss anemia-repeat H&H now, IVF bolus and IV NS maintenance  Hx of partial colectomy for severe diverticulitis  Diabetes type 2 non insulin dep-follow BG levels, SSI  Sleep apnea   Hypertension   History of DVT   [id number] over 60 now this am    Nuclear bleeding scan repeat    Colonoscopy planned    Discussed with GI  Bedrest  Telemetry monitoring  Disposition :  Patient Active Problem List   Diagnosis Code    GI bleed K92.2    DM (diabetes mellitus) ([facility] [address] E11.9    Essential hypertension, malignant I10    Diverticulosis of colon without diverticulitis K57.30    Surgery, elective Z41.9    HTN (hypertension), benign I10    Lumbar stenosis with neurogenic claudication M48.062    Lumbar stenosis M48.061    Diverticulosis K57.90    Lower GI bleed K92.2    Sleep apnea G47.30       Subjective:    Felt dizzy and weak in bathroom, called nurse alert to start emergent call  He was able to be helped back to bed  Denies CP, SOB  large watery bloody BM in toilet  No abd pain    Appears pale    Review of systems:    Constitutional: denies fevers, chills  Respiratory: denies SOB, cough  Cardiovascular: denies chest pain, palpitations  Gastrointestinal: denies nausea, vomiting      Vital signs/Intake and Output:  Visit Vitals  /56   Pulse 74   Temp 98.5 °F (36.9 °C)   Resp 16   Ht 5' 9\" (1.753 m)   Wt 90.7 kg (200 lb)   SpO2 94%   BMI 29.53 kg/m²     Current Shift:  No intake/output data recorded. Last three shifts:  07/22 1901 - 07/24 0700  In: 2020 [P.O.:1020;  I.V.:1000]  Out: 700 [Urine:700]    Exam:    General: elderly AAM alert, NAD, OX3  Head/Neck: NCAT, supple, No masses, No lymphadenopathy  CVS:Regular rate and rhythm, no M/R/G, S1/S2 heard, no thrill  Lungs:Clear to auscultation bilaterally, no wheezes, rhonchi, or rales  Abdomen: Soft, Nontender, No distention, diminished Bowel sounds, No hepatomegaly  Extremities: No C/C/E, pulses palpable 2+  Skin:pale  Neuro:grossly normal , follows commands  Psych:appropriate                Labs: Results:       Chemistry Recent Labs     07/24/20 0300 07/22/20  2333   GLU 86 286*    140   K 3.6 3.6   * 108   CO2 29 25   BUN 24* 32*   CREA 1.29 1.53*   CA 8.2* 8.9   AGAP 3 7   BUCR 19 21*   AP 58 84   TP 5.1* 6.6   ALB 2.7* 3.4   GLOB 2.4 3.2   AGRAT 1.1 1.1      CBC w/Diff Recent Labs     07/24/20  0300 07/23/20  2120 07/23/20  1338 07/23/20  0545 07/22/20 2333   WBC 5.3  --   --  5.9 5.3   RBC 3.03*  --   --  3.51* 4.24*   HGB 10.4* 10.8* 11.7* 12.0* 14.3   HCT 30.6* 31.2* 33.9* 35.5* 42.8     --   --  188 222   GRANS 44  --   --  54 48   LYMPH 39  --   --  34 38   EOS 7*  --   --  3 5      Cardiac Enzymes Recent Labs     07/22/20 2333      CKND1 2.0      Coagulation Recent Labs     07/22/20 2333   PTP 14.9   INR 1.2   APTT 28.6       Lipid Panel No results found for: CHOL, CHOLPOCT, CHOLX, CHLST, CHOLV, 987751, HDL, HDLP, LDL, LDLC, DLDLP, 092960, VLDLC, VLDL, TGLX, TRIGL, TRIGP, TGLPOCT, CHHD, CHHDX   BNP No results for input(s): BNPP in the last 72 hours.    Liver Enzymes Recent Labs     07/24/20 0300   TP 5.1*   ALB 2.7*   AP 58      Thyroid Studies No results found for: T4, T3U, TSH, TSHEXT     Procedures/imaging: see electronic medical records for all procedures/Xrays and details which were not copied into this note but were reviewed prior to creation of Gayle Fuentes MD

## 2020-07-24 NOTE — PROGRESS NOTES
1934 - Bedside report received from [address] [facility]. Patient in bed at this time. Pain 0/10. Plan of care for the day addressed with the patient. Pt informed to utilize call bell or this RNs zone phone for any questions, concerns, or needs. Pt verbalized understanding. Will continue to monitor. 2100 Dr August Ag placed order for clear liquid diet. Colonoscopy 7/24 afternoon. Bowel prep 7/24 morning. 2119 Blood glucose level 109. - Patient in bed at this time. Box 13 SR. A/O x 4. IV to RAC intact and patent. Pt denies numbness/tingling. Pulses positive, palpable. Lungs clear. Bowel sounds active to all quadrants. Pain 0/10. Cane at bedside for ambulation. Will continue to monitor. 2212 Order allopurinol 100 mg  BID. Pt states he takes allopurinol 100 mg 1x a day. Pt refused medication. PTA med rec states allopurinol 100 mg BID.  2217 Humalog held per protocol. 0428 Pt stated he needs to stool. He is very concerned about having a stool sample for Dr August Ag. Flagyl paused. Pt assisted to bathroom. Pt had bloody stool in \"hat\" for stool sample. Pt assisted back to bed. Sample collected and sent to lab. Will continue to monitor. 0530 Pt concerned, he feels he needs a blood transfusion d/t all of the blood when he stools. This RN educated pt about H&H. He is 10.4 and 30.6. Pt verbalized understanding and seemed more calm. Will continue to monitor. 4784 Blood glucose level 129. Humalog held per protocol. Bedside and Verbal shift change report given to Aydin Gorman RN  by Miranda Mariscal RN. Report included the following information SBAR, Kardex, OR Summary, Intake/Output and MAR.

## 2020-07-24 NOTE — PROGRESS NOTES
0700: Assumed care of pt from [address] [facility]. This RN was told pt was to have colonoscopy but there are no orders. Read doctors note from yesterday. Note stated yes for colonoscopy. 0815: Called endo spoke with Dora Cruz and she stated that she would speak with [facility] regarding procedure and call me back. So this RN has orders for prep.  0900: Pt wants to speak with physician this RN stated dr singh round shortly. Also spoke with Endo regarding Erasmo Chandler seeing pt as well. Dr. Erasmo Chandler is not here yet. 9480: Endo called back and stated orders are in for prep. 6322: Spoke with Endo and asked them to have Dr.El Tom call pt room to speak with pt. Pt refuses to drink prep til he speaks with   21 [phone]: Pt was up in bathroom Lorenza Ellis RN was in room with Pt and pt had syncopal episode on toilet. RRT was called pt came around and this RN and Brittny Vargas got pt back to bed. Dr. Yajaira Orosco came to RRT and saw pt ordered fluid bolus and labsalso messaged Gi  For additional instructions for possible scan. Will continue to monitor. Fluid bolus given. 1130: pt resting no complaints. No more bleeding at this time. Pt wants to be scoped very soon. This RN told pt procedure was for 2pm.  1245: pt resting no c/o pain or bleeding. Patient waiting for scope. Pt trying to drink prep.  1400: pt resting no sign of distress. Pt is still asking about procedure. 1530: pt put on bed pan. Left call light within reach for when pt is finished. 1540: pt taken of bed pan and cleaned up. Pt had bloody stool. 1608: Pt was found wondering down the rodriguez confused from blood loss pt had syncopal event from blood loss. RRT called. Pt was helped to the floor with no injury. Pt was then assessed by RN and .  ordered blood and a CT of the head. Pt taken for scope straight away after getting pt in bed  And cleaned up. This RN called son for consent to colonoscopy and endoscope. Son agreed to give consent so pt could get treatment. Called RN down in endo for RN to speak with soon regarding consent. 1715: Endo could no complete scope due pt pt status Dr. Consuelo Aldridge made aware. Spoke with Supervisor regarding pt going to [employer] stated he would go to CT with Pt because this RN cannot leave the floor. 1720: Spoke with  regarding pt status and need for higher level of care  gave order for pt to go to ICU. This RN called Supervisor to get bed.  1800: Pt had another syncopal episode in CT with Supervisor and super called RRT. Patient was transferred to ICU. 1840: Gave report to Heladio Salazar.

## 2020-07-24 NOTE — ROUTINE PROCESS
RRT @ [phone]. Assisted with moving pt back to room. Blood drawn and turned over to lab. New IV established left hand. Transport in to take pt to Endo.

## 2020-07-25 ENCOUNTER — APPOINTMENT (OUTPATIENT)
Dept: NUCLEAR MEDICINE | Age: 80
DRG: 377 | End: 2020-07-25
Attending: INTERNAL MEDICINE
Payer: MEDICARE

## 2020-07-25 ENCOUNTER — APPOINTMENT (OUTPATIENT)
Dept: GENERAL RADIOLOGY | Age: 80
DRG: 377 | End: 2020-07-25
Attending: INTERNAL MEDICINE
Payer: MEDICARE

## 2020-07-25 PROBLEM — M48.062 LUMBAR STENOSIS WITH NEUROGENIC CLAUDICATION: Status: RESOLVED | Noted: 2017-07-26 | Resolved: 2020-07-25

## 2020-07-25 PROBLEM — R57.1 HYPOVOLEMIC SHOCK (HCC): Status: ACTIVE | Noted: 2020-07-25

## 2020-07-25 PROBLEM — K92.2 LOWER GI BLEED: Status: RESOLVED | Noted: 2020-07-23 | Resolved: 2020-07-25

## 2020-07-25 PROBLEM — R57.1 HYPOVOLEMIC SHOCK (HCC): Status: RESOLVED | Noted: 2020-07-25 | Resolved: 2020-07-25

## 2020-07-25 PROBLEM — D64.9 SEVERE ANEMIA: Status: ACTIVE | Noted: 2020-07-25

## 2020-07-25 PROBLEM — N17.9 ACUTE RENAL FAILURE (ARF) (HCC): Status: ACTIVE | Noted: 2020-07-25

## 2020-07-25 LAB
ALBUMIN SERPL-MCNC: 2.2 G/DL (ref 3.4–5)
ALBUMIN/GLOB SERPL: 1.2 {RATIO} (ref 0.8–1.7)
ALP SERPL-CCNC: 43 U/L (ref 45–117)
ALT SERPL-CCNC: 12 U/L (ref 16–61)
ANION GAP SERPL CALC-SCNC: 11 MMOL/L (ref 3–18)
APTT PPP: 28.9 SEC (ref 23–36.4)
AST SERPL-CCNC: 11 U/L (ref 10–38)
BASOPHILS # BLD: 0 K/UL (ref 0–0.1)
BASOPHILS NFR BLD: 0 % (ref 0–2)
BILIRUB SERPL-MCNC: 0.4 MG/DL (ref 0.2–1)
BUN SERPL-MCNC: 30 MG/DL (ref 7–18)
BUN/CREAT SERPL: 19 (ref 12–20)
CALCIUM SERPL-MCNC: 7.4 MG/DL (ref 8.5–10.1)
CHLORIDE SERPL-SCNC: 114 MMOL/L (ref 100–111)
CO2 SERPL-SCNC: 20 MMOL/L (ref 21–32)
CREAT SERPL-MCNC: 1.62 MG/DL (ref 0.6–1.3)
DIFFERENTIAL METHOD BLD: ABNORMAL
EOSINOPHIL # BLD: 0 K/UL (ref 0–0.4)
EOSINOPHIL NFR BLD: 0 % (ref 0–5)
ERYTHROCYTE [DISTWIDTH] IN BLOOD BY AUTOMATED COUNT: 16 % (ref 11.6–14.5)
FIBRINOGEN PPP-MCNC: 165 MG/DL (ref 210–451)
GLOBULIN SER CALC-MCNC: 1.8 G/DL (ref 2–4)
GLUCOSE BLD STRIP.AUTO-MCNC: 126 MG/DL (ref 70–110)
GLUCOSE BLD STRIP.AUTO-MCNC: 155 MG/DL (ref 70–110)
GLUCOSE BLD STRIP.AUTO-MCNC: 239 MG/DL (ref 70–110)
GLUCOSE BLD STRIP.AUTO-MCNC: 256 MG/DL (ref 70–110)
GLUCOSE BLD STRIP.AUTO-MCNC: 89 MG/DL (ref 70–110)
GLUCOSE SERPL-MCNC: 233 MG/DL (ref 74–99)
HCT VFR BLD AUTO: 22.4 % (ref 36–48)
HCT VFR BLD AUTO: 22.5 % (ref 36–48)
HCT VFR BLD AUTO: 23.8 % (ref 36–48)
HGB BLD-MCNC: 7.5 G/DL (ref 13–16)
HGB BLD-MCNC: 7.7 G/DL (ref 13–16)
HGB BLD-MCNC: 8 G/DL (ref 13–16)
INR PPP: 1.5 (ref 0.8–1.2)
LYMPHOCYTES # BLD: 2.4 K/UL (ref 0.9–3.6)
LYMPHOCYTES NFR BLD: 23 % (ref 21–52)
MAGNESIUM SERPL-MCNC: 1.6 MG/DL (ref 1.6–2.6)
MCH RBC QN AUTO: 32.4 PG (ref 24–34)
MCHC RBC AUTO-ENTMCNC: 33.6 G/DL (ref 31–37)
MCV RBC AUTO: 96.4 FL (ref 74–97)
MONOCYTES # BLD: 0.8 K/UL (ref 0.05–1.2)
MONOCYTES NFR BLD: 8 % (ref 3–10)
NEUTS SEG # BLD: 7.1 K/UL (ref 1.8–8)
NEUTS SEG NFR BLD: 69 % (ref 40–73)
PLATELET # BLD AUTO: 143 K/UL (ref 135–420)
PMV BLD AUTO: 10.7 FL (ref 9.2–11.8)
POTASSIUM SERPL-SCNC: 3.9 MMOL/L (ref 3.5–5.5)
PROT SERPL-MCNC: 4 G/DL (ref 6.4–8.2)
PROTHROMBIN TIME: 18.2 SEC (ref 11.5–15.2)
RBC # BLD AUTO: 2.47 M/UL (ref 4.7–5.5)
SODIUM SERPL-SCNC: 145 MMOL/L (ref 136–145)
WBC # BLD AUTO: 10.3 K/UL (ref 4.6–13.2)

## 2020-07-25 PROCEDURE — 74011000250 HC RX REV CODE- 250: Performed by: INTERNAL MEDICINE

## 2020-07-25 PROCEDURE — 74011250636 HC RX REV CODE- 250/636: Performed by: INTERNAL MEDICINE

## 2020-07-25 PROCEDURE — 80053 COMPREHEN METABOLIC PANEL: CPT

## 2020-07-25 PROCEDURE — 99153 MOD SED SAME PHYS/QHP EA: CPT | Performed by: INTERNAL MEDICINE

## 2020-07-25 PROCEDURE — P9016 RBC LEUKOCYTES REDUCED: HCPCS

## 2020-07-25 PROCEDURE — 36430 TRANSFUSION BLD/BLD COMPNT: CPT

## 2020-07-25 PROCEDURE — 36556 INSERT NON-TUNNEL CV CATH: CPT

## 2020-07-25 PROCEDURE — 85025 COMPLETE CBC W/AUTO DIFF WBC: CPT

## 2020-07-25 PROCEDURE — 74011250636 HC RX REV CODE- 250/636: Performed by: HOSPITALIST

## 2020-07-25 PROCEDURE — 77010033678 HC OXYGEN DAILY

## 2020-07-25 PROCEDURE — 36591 DRAW BLOOD OFF VENOUS DEVICE: CPT

## 2020-07-25 PROCEDURE — 82962 GLUCOSE BLOOD TEST: CPT

## 2020-07-25 PROCEDURE — 74011250637 HC RX REV CODE- 250/637: Performed by: INTERNAL MEDICINE

## 2020-07-25 PROCEDURE — 85018 HEMOGLOBIN: CPT

## 2020-07-25 PROCEDURE — 85730 THROMBOPLASTIN TIME PARTIAL: CPT

## 2020-07-25 PROCEDURE — 85610 PROTHROMBIN TIME: CPT

## 2020-07-25 PROCEDURE — 94660 CPAP INITIATION&MGMT: CPT

## 2020-07-25 PROCEDURE — 0W3P8ZZ CONTROL BLEEDING IN GASTROINTESTINAL TRACT, VIA NATURAL OR ARTIFICIAL OPENING ENDOSCOPIC: ICD-10-PCS | Performed by: INTERNAL MEDICINE

## 2020-07-25 PROCEDURE — 0DJ08ZZ INSPECTION OF UPPER INTESTINAL TRACT, VIA NATURAL OR ARTIFICIAL OPENING ENDOSCOPIC: ICD-10-PCS | Performed by: INTERNAL MEDICINE

## 2020-07-25 PROCEDURE — 77030040361 HC SLV COMPR DVT MDII -B: Performed by: INTERNAL MEDICINE

## 2020-07-25 PROCEDURE — 85384 FIBRINOGEN ACTIVITY: CPT

## 2020-07-25 PROCEDURE — 86923 COMPATIBILITY TEST ELECTRIC: CPT

## 2020-07-25 PROCEDURE — G0500 MOD SEDAT ENDO SERVICE >5YRS: HCPCS | Performed by: INTERNAL MEDICINE

## 2020-07-25 PROCEDURE — 77030005513 HC CATH URETH FOL11 MDII -B

## 2020-07-25 PROCEDURE — 86900 BLOOD TYPING SEROLOGIC ABO: CPT

## 2020-07-25 PROCEDURE — 02HV33Z INSERTION OF INFUSION DEVICE INTO SUPERIOR VENA CAVA, PERCUTANEOUS APPROACH: ICD-10-PCS | Performed by: INTERNAL MEDICINE

## 2020-07-25 PROCEDURE — 30233L1 TRANSFUSION OF NONAUTOLOGOUS FRESH PLASMA INTO PERIPHERAL VEIN, PERCUTANEOUS APPROACH: ICD-10-PCS | Performed by: HOSPITALIST

## 2020-07-25 PROCEDURE — 76040000009: Performed by: INTERNAL MEDICINE

## 2020-07-25 PROCEDURE — 77030003657 HC NDL SCLER BSC -B: Performed by: INTERNAL MEDICINE

## 2020-07-25 PROCEDURE — C9113 INJ PANTOPRAZOLE SODIUM, VIA: HCPCS | Performed by: INTERNAL MEDICINE

## 2020-07-25 PROCEDURE — 74011250637 HC RX REV CODE- 250/637: Performed by: HOSPITALIST

## 2020-07-25 PROCEDURE — 77030010936 HC CLP LIG BSC -C: Performed by: INTERNAL MEDICINE

## 2020-07-25 PROCEDURE — 71045 X-RAY EXAM CHEST 1 VIEW: CPT

## 2020-07-25 PROCEDURE — 83735 ASSAY OF MAGNESIUM: CPT

## 2020-07-25 PROCEDURE — 65610000006 HC RM INTENSIVE CARE

## 2020-07-25 PROCEDURE — C1751 CATH, INF, PER/CENT/MIDLINE: HCPCS

## 2020-07-25 PROCEDURE — P9047 ALBUMIN (HUMAN), 25%, 50ML: HCPCS | Performed by: HOSPITALIST

## 2020-07-25 PROCEDURE — P9059 PLASMA, FRZ BETWEEN 8-24HOUR: HCPCS

## 2020-07-25 PROCEDURE — 74011636637 HC RX REV CODE- 636/637: Performed by: HOSPITALIST

## 2020-07-25 PROCEDURE — 74011000258 HC RX REV CODE- 258: Performed by: INTERNAL MEDICINE

## 2020-07-25 RX ORDER — FENTANYL CITRATE 50 UG/ML
100 INJECTION, SOLUTION INTRAMUSCULAR; INTRAVENOUS
Status: DISPENSED | OUTPATIENT
Start: 2020-07-25 | End: 2020-07-25

## 2020-07-25 RX ORDER — MIDAZOLAM HYDROCHLORIDE 1 MG/ML
.25-5 INJECTION, SOLUTION INTRAMUSCULAR; INTRAVENOUS
Status: DISCONTINUED | OUTPATIENT
Start: 2020-07-25 | End: 2020-07-25

## 2020-07-25 RX ORDER — SODIUM CHLORIDE 9 MG/ML
250 INJECTION, SOLUTION INTRAVENOUS AS NEEDED
Status: DISCONTINUED | OUTPATIENT
Start: 2020-07-25 | End: 2020-07-27 | Stop reason: SDUPTHER

## 2020-07-25 RX ORDER — FLUMAZENIL 0.1 MG/ML
0.2 INJECTION INTRAVENOUS
Status: ACTIVE | OUTPATIENT
Start: 2020-07-25 | End: 2020-07-25

## 2020-07-25 RX ORDER — SODIUM CHLORIDE 9 MG/ML
125 INJECTION, SOLUTION INTRAVENOUS CONTINUOUS
Status: DISCONTINUED | OUTPATIENT
Start: 2020-07-25 | End: 2020-07-26

## 2020-07-25 RX ORDER — MIDAZOLAM HYDROCHLORIDE 1 MG/ML
.25-5 INJECTION, SOLUTION INTRAMUSCULAR; INTRAVENOUS
Status: ACTIVE | OUTPATIENT
Start: 2020-07-25 | End: 2020-07-25

## 2020-07-25 RX ORDER — NALOXONE HYDROCHLORIDE 0.4 MG/ML
0.4 INJECTION, SOLUTION INTRAMUSCULAR; INTRAVENOUS; SUBCUTANEOUS
Status: ACTIVE | OUTPATIENT
Start: 2020-07-25 | End: 2020-07-25

## 2020-07-25 RX ORDER — MIDAZOLAM HYDROCHLORIDE 1 MG/ML
INJECTION, SOLUTION INTRAMUSCULAR; INTRAVENOUS
Status: DISPENSED
Start: 2020-07-25 | End: 2020-07-25

## 2020-07-25 RX ORDER — ALBUMIN HUMAN 250 G/1000ML
12.5 SOLUTION INTRAVENOUS EVERY 6 HOURS
Status: DISCONTINUED | OUTPATIENT
Start: 2020-07-25 | End: 2020-07-25

## 2020-07-25 RX ORDER — FENTANYL CITRATE 50 UG/ML
INJECTION, SOLUTION INTRAMUSCULAR; INTRAVENOUS
Status: DISPENSED
Start: 2020-07-25 | End: 2020-07-25

## 2020-07-25 RX ADMIN — SODIUM CHLORIDE 1000 ML: 900 INJECTION, SOLUTION INTRAVENOUS at 08:20

## 2020-07-25 RX ADMIN — INSULIN LISPRO 2 UNITS: 100 INJECTION, SOLUTION INTRAVENOUS; SUBCUTANEOUS at 13:47

## 2020-07-25 RX ADMIN — SODIUM CHLORIDE 40 MG: 9 INJECTION, SOLUTION INTRAMUSCULAR; INTRAVENOUS; SUBCUTANEOUS at 08:21

## 2020-07-25 RX ADMIN — ALLOPURINOL 100 MG: 100 TABLET ORAL at 00:30

## 2020-07-25 RX ADMIN — INSULIN GLARGINE 10 UNITS: 100 INJECTION, SOLUTION SUBCUTANEOUS at 08:20

## 2020-07-25 RX ADMIN — SODIUM CHLORIDE 125 ML/HR: 900 INJECTION, SOLUTION INTRAVENOUS at 13:48

## 2020-07-25 RX ADMIN — INSULIN LISPRO 4 UNITS: 100 INJECTION, SOLUTION INTRAVENOUS; SUBCUTANEOUS at 06:28

## 2020-07-25 RX ADMIN — SODIUM CHLORIDE 125 ML/HR: 900 INJECTION, SOLUTION INTRAVENOUS at 08:21

## 2020-07-25 RX ADMIN — Medication 10 ML: at 21:36

## 2020-07-25 RX ADMIN — Medication 10 ML: at 13:47

## 2020-07-25 RX ADMIN — CARBOXYMETHYLCELLULOSE SODIUM 1 DROP: 5 SOLUTION/ DROPS OPHTHALMIC at 21:00

## 2020-07-25 RX ADMIN — SODIUM CHLORIDE 40 MG: 9 INJECTION, SOLUTION INTRAMUSCULAR; INTRAVENOUS; SUBCUTANEOUS at 21:34

## 2020-07-25 RX ADMIN — METRONIDAZOLE 500 MG: 500 INJECTION, SOLUTION INTRAVENOUS at 06:29

## 2020-07-25 RX ADMIN — PHYTONADIONE 10 MG: 10 INJECTION, EMULSION INTRAMUSCULAR; INTRAVENOUS; SUBCUTANEOUS at 17:03

## 2020-07-25 RX ADMIN — ALBUMIN (HUMAN) 12.5 G: 0.25 INJECTION, SOLUTION INTRAVENOUS at 11:36

## 2020-07-25 NOTE — CONSULTS
Consult Note    Patient: Rosanna Garrison MRN: [id number]  CSN: [id number]    YOB: 1940  Age: [de-identified] y.o. Sex: male    DOA: 7/22/2020 LOS:  LOS: 2 days        Requesting Physician: ICU  Reason for Consultation: GI bleed               HPI:     Rosanna Garrison is a [de-identified] y.o. male who has been seen for GI bleed with history of colon resection in past.  Recent GI bleed with bloody BM. Imaging has not shown area of any GI bleed. Past Medical History:   Diagnosis Date    Adverse effect of anesthesia     took long time coming out with back surgery done at [facility]. Thinks it was related to Fentanyl    Arthritis     Diabetes ([address] 1982    type 2    Diverticulitis     Gastrointestinal disorder     diverticulitis    GERD (gastroesophageal reflux disease)     no meds now, was on Nexium previously    Gout     Hypertension     Hypovolemic shock ([address] 7/25/2020    Lower GI bleed 7/23/2020    Lumbar stenosis with neurogenic claudication 7/26/2017    Other ill-defined conditions(799.89) 2012    Diverticulitis, hospitalized x 4 days    Surgery, elective 8/31/2015    Thromboembolus ([address]     behind left knee was on Xarelto stopped 8-1-15. On no anticoagulation now    Unspecified adverse effect of anesthesia     pt states he \"had trouble coming to, took too long\"    Unspecified sleep apnea     CPAP user. Pt instructed to bring CPAP on DOS       Past Surgical History:   Procedure Laterality Date    COLONOSCOPY N/A 6/13/2016    COLONOSCOPY AND performed by Joseline Carr MD at [facility] ENDOSCOPY    HX [address] [facility]     ORIF right ankle   [facility] SERVICES SURGERY  2002 & 2005 ,2012    HX CARPAL TUNNEL RELEASE      HX COLECTOMY      part of colon removed due to diverticulitis    HX HERNIA REPAIR      left Ing hernia repair x3    HX ORTHOPAEDIC  2010    left    HX ORTHOPAEDIC  2012    Rem.  hardware right ankle    HX RETINAL DETACHMENT REPAIR  1996       Family History   Problem Relation Age of Onset    Hypertension Mother     Malignant Hyperthermia Neg Hx        Social History     Socioeconomic History    Marital status:      Spouse name: Not on file    Number of children: Not on file    Years of education: Not on file    Highest education level: Not on file   Tobacco Use    Smoking status: Former Smoker     Packs/day: 0.25     Last attempt to quit: 2014     Years since quittin.5    Smokeless tobacco: Never Used   Substance and Sexual Activity    Alcohol use: Yes     Alcohol/week: 5.0 standard drinks     Types: 6 Standard drinks or equivalent per week     Frequency: 2-3 times a week     Comment: rare    Drug use: No       Prior to Admission medications    Medication Sig Start Date End Date Taking? Authorizing Provider   vitamin D-Q-R-lutein-minerals (OCUVITE) tablet 1 Tab two (2) times a day. Yes Provider, Historical   polyvinyl alcohol-povidon,PF, (REFRESH CLASSIC) 1.4-0.6 % ophthalmic solution Administer 1-2 Drops to both eyes as needed. Yes Provider, Historical   tamsulosin (Flomax) 0.4 mg capsule Take 0.4 mg by mouth daily. Yes Other, MD Marcia   albuterol (PROVENTIL HFA, VENTOLIN HFA, PROAIR HFA) 90 mcg/actuation inhaler Take 2 Puffs by inhalation daily as needed for Wheezing. Yes Other, MD Marcia   Magnesium Oxide 500 mg cap Take 1 Cap by mouth. Yes Other, MD Marcia   methocarbamol (ROBAXIN) 500 mg tablet Take 1 Tab by mouth two (2) times daily as needed (Musculoskeletal pain; muscle spasms). 19  Yes Rosenda Herrera,    amLODIPine-valsartan (EXFORGE)  mg per tablet Take 1 Tab by mouth daily. Yes Provider, Historical   polyethylene glycol (MIRALAX) 17 gram/dose powder Take 17 g by mouth daily as needed. Yes Provider, Historical   carboxymethylcellulose sodium (REFRESH LIQUIGEL) 1 % dlgl Apply  to eye two (2) times a day. Indications: DRY EYE   Yes Provider, Historical   melatonin tab tablet Take 5 mg by mouth nightly.    Yes Provider, Historical   multivitamin (ONE A DAY) tablet Take 1 Tab by mouth daily. Yes Provider, Historical   testosterone (FORTESTA) 10 mg/0.5 gram /actuation glpm by TransDERmal route daily. Indications: 1 pump each thigh daily in am 10 mg   Yes Provider, Historical   metoprolol succinate (TOPROL-XL) 100 mg XL tablet Take 100 mg by mouth daily. Take with sip of water dos   Indications: HYPERTENSION   Yes Provider, Historical   telmisartan-hydrochlorothiazide (MICARDIS HCT) 80-12.5 mg per tablet Take 1 Tab by mouth daily. Take with sip of water dos  Indications: HYPERTENSION   Yes Other, MD Marcia   atorvastatin (LIPITOR) 10 mg tablet Take 10 mg by mouth nightly. Yes Other, MD Marcia   sitaGLIPtin (JANUVIA) 100 mg tablet Take 100 mg by mouth daily. Yes Other, MD Marcia   furosemide (LASIX) 20 mg tablet Take 20 mg by mouth as needed. Indications: Edema   Yes Other, MD Marcia   allopurinol (ZYLOPRIM) 100 mg tablet Take 100 mg by mouth two (2) times a day. Yes Other, MD Marcia   glyBURIDE-metFORMIN (GLUCOVANCE) 2.5-500 mg per tablet Take 1 Tab by mouth as needed. For Glucose >150 in the evening   Yes Other, MD Marcia   aspirin delayed-release 81 mg tablet Take  by mouth daily. Provider, Historical   tadalafil (CIALIS) 5 mg tablet Take 5 mg by mouth as needed. Provider, Historical   potassium chloride SR (KLOR-CON 8) 8 mEq tablet Take 8 mEq by mouth daily as needed. Indications: HYPOKALEMIA PREVENTION    Other, MD Marcia       Allergies   Allergen Reactions    Norvasc [Amlodipine] Cough    Tolectin [Tolmetin] Hives       Review of Systems  A comprehensive review of systems was negative except for that written in the History of Present Illness. Physical Exam:      Visit Vitals  BP 96/52   Pulse 88   Temp 98.1 °F (36.7 °C)   Resp 18   Ht 5' 9\" (1.753 m)   Wt 90.7 kg (200 lb)   SpO2 100%   BMI 29.53 kg/m²       Physical Exam:  Pertinent items are noted in HPI. Labs Reviewed:   All lab results for the last 24 hours reviewed. Assessment/Plan     Active Problems:    DM (diabetes mellitus) ([facility] (10/7/2012)      Diverticulosis (7/23/2020)      Sleep apnea (7/23/2020)      Acute renal failure (ARF) ([facility] (7/25/2020)      Severe anemia (7/25/2020)        Rec continued GI w/u and EGD/cscope. May need repeat bleeding scan and CTA. Rec IR intervention if becomes unstable. Surgery not indicated unless source identified, he has had previous colectomy and surgery would be difficult. If cannot do IR immediatly  and source is possible small bowel recommend transfer to tertiary care center.

## 2020-07-25 NOTE — PROGRESS NOTES
0700  Bedside, Verbal and Written shift change report given to CAMILLE Jain (oncoming nurse) by BRADY Dineor,RN (offgoing nurse). Report included the following information SBAR, Kardex, Intake/Output and Recent Results. 0800  Transport at bedside taking pt for NM Scan. 0830  Pt returned from from [address] [place] [address], pt was unable to tolerate scan, sat up during scan, had large bloody BM, pt returned to ICU attached to monitor, VSS, aox3. Denies pain at this time, tele monitored, Dr. Geronimo Shi and Dr. Giuseppe Bender at bedside discussing plan. 0930  Time out completed by Dr. Geronimo Shi, consent on chart for central line. 1100  1st unit PRBCs started, pt remains aox3, following commands, talkative. VSS  1200  Dr. Therese Scheuermann at bedside with endo team for scope. 1400  Pt status unchanged, resting with eyes closed, sitter at bedside. VSS  1600  No changed. Reviewed med's with Dr. Giuseppe Bender, orders received. See MAR.  1900 Bedside, Verbal and Written shift change report given to CAMILLE Snell RN (oncoming nurse) by Wes Friedman. Shyann Jain (offgoing nurse). Report included the following information SBAR, Kardex, Intake/Output and Recent Results.

## 2020-07-25 NOTE — PROCEDURES
(EGD) Esophagogastroduodenoscopy (UPPER ENDOSCOPY) Procedure Note  [facility] [place]  __________________________________________________________________________________________________________________________      7/25/2020     Patient: Jairo Milton YOB: 1940 Gender: male Age: [de-identified] y.o. INDICATION:  Severe rectal bleeding of red burgundy blood started this last Wednesday on July 22 Hgb dropped to 8. He required 4 blood transfusion. He has chronic erosive GERD but in the last 4 years he was taking his Nexium 20 mg twice weekly. He was also on baby ASA. We were going to scope him yesterday but he became weak and confused. He was transferred to the ICU where he continued to bleed. CT angio of the abdomen and pelvis was negative. The GI bleeding scan could not be done this morning because he was weak and not collaborative    : Chelita Moore MD    Referring Provider:  Nevaeh Molina MD    Sedation:  Versed 3 mg IV, Fentanyl 100 mcg IV     Procedure Details:  After infomed consent was obtained for the procedure, with all risks and benefits of procedure explained to the family the patient was kept in his ICU room and placed in the left lateral decubitus position. Following sequential administration of sedation as per above, the endoscope was inserted into the mouth and advanced under direct vision to third portion of the duodenum. A careful inspection was made as the gastroscope was withdrawn, including a retroflexed view of the proximal stomach; findings and interventions are described below. OROPHARYNX: The vocal Cords and the larynx are normal.   ESOPHAGUS: The proximal, mid, and distal oesophagus are normal. The Z-Line is intact. 1.5 cm reducible hiatal hernia. The diaphragmatic opening is located at 40 cm  STOMACH: No blood or food retention.  The fundus on antegrade and retroflex views is normal. The cardia, body, lesser curvature, greater curvature and pylorus are normal. One small erosion in the antrum. Mild diffuse gastritis biopsy not taken  DUODENUM: The bulb, second, third, fourth portions and major papilla are normal.  Therapies:  Nil    Specimen: none           Complications:   None    EBL:  Nil. IMPRESSION:   1.5 cm reducible hiatal hernia. The diaphragmatic opening is located at 40 cm  One small gastric antral erosion. Mild diffuse gastritis biopsy not taken. Nothing found to explain the bleeding          RECOMMENDATION:  may resume soft diet. Avoid NSAID's for life. May resume baby ASA ib 3 days. Make a FU appointment at the office. continue taking OTC Nexium as needed only. Would proceed to do the colonoscopy.     Assistant: None    --Jarek Santos MD on 7/25/2020 at 10:35 AM

## 2020-07-25 NOTE — PROGRESS NOTES
Bedside and Verbal shift change report received from CAMILLE Payne (offgoing nurse). Report included the following information SBAR, Intake/Output, MAR and Recent Results. 1923 RBC infusion started at this time. 1930 Shift assessment completed, see EMR    2145 Patient got out off bed and pulled his IV line out while receiving his blood transfusion. Patient redirected back to bed and bed alarm was set. Sitter at bedside at this time. 2215 RBC transfusion completed. 2345 Reassessment completed, no changes noted. Post transfusion Hgb 9.5 and Hct 8.6.    0445 Reassessment completed, no changes noted. Bedside and Verbal shift change report given to CAMILLE Estrella RN (oncoming nurse)  Report included the following information SBAR, Kardex, Intake/Output, MAR and Recent Results.

## 2020-07-25 NOTE — PROCEDURES
[facility]  Central Line Procedure Note with US guidance    Indication:   Lower GI bleeding  Poor IV access    Risks, benefits, alternatives explained and consent obtained from patient. Patient positioned in Trendelenburg. Full sterile barrier precautions used. Sterility Protocol followed. (cap, mask sterile gown, sterile gloves, large sterile sheet, hand hygiene, 2% chlorhexidine for cutaneous antisepsis, sterile probe cover). Local anesthetic with Lidocaine 1% - about 3 cc. Using ultrasound guidance, 7 Fr triple lumen cath placed and secured at 15 cm. RIGHT internal jugular vein cannulated x 2 attempt(s) utilizing the Seldinger technique. First attempt could not thread the wire, second attempt was successful. Position of wire confirmed in vein using US before dilating. Wire was removed. No immediate complications. No arterial puncture done during this procedure. Good venous nonpulsatile blood return, carefully flushed. Catheter secured & Biopatch applied. Sterile Tegaderm placed.     Photo taken for chart    CXR ordered    Paige Roper MD

## 2020-07-25 NOTE — CONSULTS
[address] [facility]    Name:  Floyd Hamilton  MR#:   [id number]  :  1940  ACCOUNT #:  [de-identified]  DATE OF SERVICE:  2020    HISTORY OF PRESENT ILLNESS:  This is an 80-year-old male, who was admitted two days ago because of acute GI bleeding that started the same day. Initially, he was passing pink- tinted stools, but after this later in the evening, he passed large quantities of dark red blood on multiple occasions. This continued intermittently. He had a large one this morning where it was preceded by lower abdominal cramps. He denies having nausea, vomiting. He has been having problem with chronic GERD for many years. He usually takes Nexium 20 mg twice a week. He denies taking any antiinflammatory medication or aspirin. We know that in the past, he was scoped by Dr. Kailey Lewis on multiple occasions from above and below. There was a mention of erosive esophagitis. The biopsy from the cardia in  revealed the presence of chronic active gastritis, negative for Helicobacter pylori, but no antral biopsy was taken. The patient used to take Nexium 40 mg daily, but in the last few years, he had been taking only Nexium as needed for mild heartburns. He denies having dysphagia or weight loss. No abdominal pain. He did call today on a couple of occasions and after I saw him this morning, he became later on confused, disoriented, which was striking to me. He is not known to have any liver disease. He does not smoke or drink alcohol. He claimed that he quit smoking five years ago. He already had a history of lower GI bleeding, where in 2016 he had 33.5 cm of his sigmoid removed for acute-on-chronic bleeding, diverticulitis. After this, he did not have any bleeding until this time. The last colonoscopy done by Dr. Kailey Lewis in 2016, spoke that there was normal anastomosis. There was no evidence of any polyps or diverticula, although in the past he did have history of polyps. This patient had apparently multiple EGDs and coloscopy at [facility] in the past, but his last colonoscopy was 2016. CT scan of the abdomen and pelvis done today around noon, with and without contrast, revealed the presence of prior sigmoid resections with anastomosis in the pelvis, scattered diverticula identified, some scar area of subtle hyperdensity within the stomach, duodenum, and portion of the distal colon, which are seen on both noncontrast and postcontrast imaging, but there was no evidence of any active GI bleeding. There were degenerative changes throughout the spine. There were prior thoracolumbar fusions and atherosclerosis. MEDICATIONS AT HOME:  1. Flomax 0.4 mg.  2.  Magnesium oxide 500 mg.  3.  Robaxin 500 mg b.i.d.  4. Exforge .  5.  MiraLAX 17 g daily. 6.  Melatonin tablets. 7.  Multivitamins. 8.  Testosterone 10 mg/0.5 g transdermal daily. 9.  Januvia 100 mg. 10.  Lasix 20 mg. 11.  Glucovance 2.5-500 as needed. 12.  Aspirin 81 mg. 13.  Cialis 5. 14.  Potassium chloride 80 mEq as needed. 15.  Albuterol. 16.  Lipitor 10. 17.  Allopurinol 100 mg b.i.d. 18.  Micardis 80-12.5 mg. PAST MEDICAL HISTORY:  He is known to be diabetic for many years. Has no coronary artery disease, no history of stroke. He denies having shortness of breath or chest pain. ALLERGIES:  NO KNOWN DRUG ALLERGIES. PHYSICAL EXAMINATION:  GENERAL:  We have an 71-year-old [facility] American male, who appears to be in no distress. When I saw him earlier today, he was well oriented and coherent, but now he appears to be disoriented. He does not appear to be in any distress. VITAL SIGNS:  He weighed 200 pounds. Temperature 98.2; pulse 84 to 79; blood pressure 93/53 at noon, but now it is 128/61; breathing 16; saturation 100% on room air. SKIN:  Normal.  No evidence of any rash. HEENT:  The eyes are remarkable for pale conjunctivae. The sclerae are anicteric.   The pupils are equal and reactive to light. The oropharyngeal cavity, he has his own teeth. The mucous membranes are slightly pale. NECK:  Supple. No palpable mass. No enlarged thyroid. LUNGS:  Clear to auscultation. HEART:  The cardiac rhythm is regular. S1 and S2 normal.  No murmur. ABDOMEN:  Nondistended, very soft, nontender. No mass or organomegaly. He has a long mid abdominal surgical scar. EXTREMITIES:  Unremarkable. No pedal edema. No clubbing. LABORATORY DATA:  Blood tests:  Hemoglobin on arrival was 12 and it dropped gradually to 9.7 at 10:30 this morning. His MCV was high at 101.1, WBC 5.3, normal differential except today he had high eosinophils. On arrival, his BUN was 32 and today 24, it was 18 on 05/04/2019. Creatinine was 1.63 and 1.29. LFTs are normal, CPK and CPK-MB. Hemoglobin A1c is 6.4. ASSESSMENT AND PLAN:  In conclusion, this is an 45-year-old male who had a history of previous significant lower gastrointestinal bleeding, that was attributed in the past to severe diverticulosis of the sigmoid and had 33 cm of his sigmoid removed in 2016 by Dr. Noe Nassar. The patient presented at this time with a similar type of pain. It appears to be lower gastrointestinal, but with the presence of mild elevation of the BUN and the fact that he has not been taking his proton pump inhibitor on a regular basis, makes me wonder if he could be bleeding also from above. We are going to do a colonoscopy and I would like to do an upper endoscopy as well. The patient takes only baby aspirin, but denies taking any other nonsteroidal antiinflammatory drugs. He is diabetic, has chronic kidney disease. Presently he is disoriented, and this may be related to a small stroke, we are not sure. We would like to check his ammonia level, although his CT scan did not mention any cirrhosis and the platelets were normal.    He may have to be evaluated by Neurology for this.   The patient appears to move all his four extremities and does not have facial asymmetry. Further note will follow.     Sincerely,      Temitope Kimball MD      ME/MARIA [email or web address]  D:  07/24/2020 17:13  T:  07/24/2020 21:41  JOB #:  [id number]  CC:  Santiago Bhatt MD

## 2020-07-25 NOTE — CONSULTS
[facility]  Pulmonary, Critical Care, and Sleep Medicine    Initial Patient Consult    Name: Jairo Milton MRN: [id number]   : 1940 Hospital: [facility] [place]   Date: 2020  Room: 102/01     Subjective: This patient has been seen and evaluated at the request of Dr. Caroline Forerst for GI bleeding needing icu admission. Patient is a [de-identified] y.o. male with history of diverticulosis, type 2 diabetes mellitus, hypertension, obstructive sleep apnea on CPAP therapy and history of DVT in the past, currently not on anticoagulation therapy. The patient has history of significant lower GI bleeding in 2016, and underwent sigmoid colectomy by Dr. Talita Farnsworth for diverticular bleeding. The patient was admitted on  early morning with bloody bowel movements. He appears to have recurrent bloody BMs. He has had CT abdomen and nuclear medicine scan so far-nondiagnostic. The patient is currently in ICU. He had some confusion episodes last night. This morning he could not tolerate repeat nuclear medicine scan due to confusion and agitation. RN reports episodic bloody BMs. Patient has no chest pains or shortness of breath. He denies any abdominal pains. Prior history of GERD on Nexium therapy. Patient denies any CAD or CHF or asthma or COPD. He had been a smoker till about 5 years ago. He had used albuterol inhaler in the past.  Sleep-patient has history of FAUSTO on CPAP therapy at home. Prior echocardiogram EF 60-65% 2018. Afebrile; blood pressure low normal, but not needing pressor. Past Medical History:   Diagnosis Date    Adverse effect of anesthesia     took long time coming out with back surgery done at [facility].  Thinks it was related to Fentanyl    Arthritis     Diabetes ([address]     type 2    Diverticulitis     Gastrointestinal disorder     diverticulitis    GERD (gastroesophageal reflux disease)     no meds now, was on Nexium previously    Gout     Hypertension  Other ill-defined conditions(799.89) 2012    Diverticulitis, hospitalized x 4 days    Thromboembolus [facility])     behind left knee was on Xarelto stopped 8-1-15. On no anticoagulation now    Unspecified adverse effect of anesthesia     pt states he \"had trouble coming to, took too long\"    Unspecified sleep apnea     CPAP user. Pt instructed to bring CPAP on DOS      Past Surgical History:   Procedure Laterality Date    COLONOSCOPY N/A 6/13/2016    COLONOSCOPY AND performed by Mary Ardon MD at THE [facility] ENDOSCOPY    HX [address] [facility]     ORIF right ankle   [facility] SERVICES SURGERY  2002 & 2005 ,2012    HX CARPAL TUNNEL RELEASE      HX COLECTOMY      part of colon removed due to diverticulitis    HX HERNIA REPAIR      left Ing hernia repair x3    HX ORTHOPAEDIC  2010    left    HX ORTHOPAEDIC  2012    Rem. hardware right ankle    HX RETINAL DETACHMENT REPAIR  1996      Prior to Admission medications    Medication Sig Start Date End Date Taking? Authorizing Provider   vitamin I-I-W-lutein-minerals (OCUVITE) tablet 1 Tab two (2) times a day. Yes Provider, Historical   polyvinyl alcohol-povidon,PF, (REFRESH CLASSIC) 1.4-0.6 % ophthalmic solution Administer 1-2 Drops to both eyes as needed. Yes Provider, Historical   tamsulosin (Flomax) 0.4 mg capsule Take 0.4 mg by mouth daily. Yes Other, MD Marcia   albuterol (PROVENTIL HFA, VENTOLIN HFA, PROAIR HFA) 90 mcg/actuation inhaler Take 2 Puffs by inhalation daily as needed for Wheezing. Yes Other, MD Marcia   Magnesium Oxide 500 mg cap Take 1 Cap by mouth. Yes Other, MD Marcia   methocarbamol (ROBAXIN) 500 mg tablet Take 1 Tab by mouth two (2) times daily as needed (Musculoskeletal pain; muscle spasms). 9/13/19  Yes Natalie Herrera,    amLODIPine-valsartan (EXFORGE)  mg per tablet Take 1 Tab by mouth daily. Yes Provider, Historical   polyethylene glycol (MIRALAX) 17 gram/dose powder Take 17 g by mouth daily as needed.    Yes Provider, Historical carboxymethylcellulose sodium (REFRESH LIQUIGEL) 1 % dlgl Apply  to eye two (2) times a day. Indications: DRY EYE   Yes Provider, Historical   melatonin tab tablet Take 5 mg by mouth nightly. Yes Provider, Historical   multivitamin (ONE A DAY) tablet Take 1 Tab by mouth daily. Yes Provider, Historical   testosterone (FORTESTA) 10 mg/0.5 gram /actuation glpm by TransDERmal route daily. Indications: 1 pump each thigh daily in am 10 mg   Yes Provider, Historical   metoprolol succinate (TOPROL-XL) 100 mg XL tablet Take 100 mg by mouth daily. Take with sip of water dos   Indications: HYPERTENSION   Yes Provider, Historical   telmisartan-hydrochlorothiazide (MICARDIS HCT) 80-12.5 mg per tablet Take 1 Tab by mouth daily. Take with sip of water dos  Indications: HYPERTENSION   Yes Other, MD Marcia   atorvastatin (LIPITOR) 10 mg tablet Take 10 mg by mouth nightly. Yes Other, MD Marcia   sitaGLIPtin (JANUVIA) 100 mg tablet Take 100 mg by mouth daily. Yes Other, MD Marcia   furosemide (LASIX) 20 mg tablet Take 20 mg by mouth as needed. Indications: Edema   Yes Other, MD Marcia   allopurinol (ZYLOPRIM) 100 mg tablet Take 100 mg by mouth two (2) times a day. Yes Other, MD Marcia   glyBURIDE-metFORMIN (GLUCOVANCE) 2.5-500 mg per tablet Take 1 Tab by mouth as needed. For Glucose >150 in the evening   Yes Other, MD Marcia   aspirin delayed-release 81 mg tablet Take  by mouth daily. Provider, Historical   tadalafil (CIALIS) 5 mg tablet Take 5 mg by mouth as needed. Provider, Historical   potassium chloride SR (KLOR-CON 8) 8 mEq tablet Take 8 mEq by mouth daily as needed.  Indications: HYPOKALEMIA PREVENTION    Other, MD Marcia     Allergies   Allergen Reactions    Norvasc [Amlodipine] Cough    Tolectin [Tolmetin] Hives      Social History     Tobacco Use    Smoking status: Former Smoker     Packs/day: 0.25     Last attempt to quit: 2014     Years since quittin.5    Smokeless tobacco: Never Used   Substance Use Topics    Alcohol use: Yes     Alcohol/week: 5.0 standard drinks     Types: 6 Standard drinks or equivalent per week     Frequency: 2-3 times a week     Comment: rare      Family History   Problem Relation Age of Onset    Hypertension Mother     Malignant Hyperthermia Neg Hx         Current Facility-Administered Medications   Medication Dose Route Frequency    0.9% sodium chloride infusion  125 mL/hr IntraVENous CONTINUOUS    albumin human 25% (BUMINATE) solution 12.5 g  12.5 g IntraVENous Q6H    carboxymethylcellulose sodium (REFRESH PLUS) 0.5 % ophthalmic solution 1 Drop  1 Drop Both Eyes BID    [Held by provider] vitamin Z-Y-L-lutein-minerals (OCUVITE) tablet 1 Tab  1 Tab Oral BID    pantoprazole (PROTONIX) 40 mg in 0.9% sodium chloride 10 mL injection  40 mg IntraVENous Q12H    lidocaine 4 % patch 1 Patch  1 Patch TransDERmal Q24H    [Held by provider] allopurinoL (ZYLOPRIM) tablet 100 mg  100 mg Oral BID    sodium chloride (NS) flush 5-40 mL  5-40 mL IntraVENous Q8H    [Held by provider] atorvastatin (LIPITOR) tablet 10 mg  10 mg Oral QHS    [Held by provider] metoprolol succinate (TOPROL-XL) tablet 100 mg  100 mg Oral DAILY    [Held by provider] telmisartan (MICARDIS) tablet 80 mg  80 mg Oral DAILY    insulin glargine (LANTUS) injection 10 Units  10 Units SubCUTAneous DAILY    insulin lispro (HUMALOG) injection   SubCUTAneous AC&HS    metroNIDAZOLE (FLAGYL) IVPB premix 500 mg  500 mg IntraVENous Q8H         Review of Systems:  Limited due to patient condition     Objective:   Vital Signs:    Visit Vitals  BP 96/52   Pulse 88   Temp 98.1 °F (36.7 °C)   Resp 18   Ht 5' 9\" (1.753 m)   Wt 90.7 kg (200 lb)   SpO2 100%   BMI 29.53 kg/m²       O2 Device: Nasal cannula   O2 Flow Rate (L/min): 6 l/min   Temp (24hrs), Av.1 °F (36.7 °C), Min:97.4 °F (36.3 °C), Max:98.6 °F (37 °C)       Intake/Output:   Last shift:      No intake/output data recorded.   Last 3 shifts:  1901 -  0700  In: 18 [P.O.:240]  Out: 201 [Urine:200]    Intake/Output Summary (Last 24 hours) at 7/25/2020 0851  Last data filed at 7/25/2020 0130  Gross per 24 hour   Intake 620 ml   Output 201 ml   Net 419 ml         Physical Exam:   Comfortable; on nc O2; acyanotic; appears elderly and good for age  [de-identified]: pupils not dilated, no scleral jaundice, moist oral mucosa, no nasal drainage; pale tongue and conjunctiva  Neck: No adenopathy or thyroid swelling  CVS: S1S2 no murmurs; JVD not elevated  RS: Good air entry bilaterally, decreased BS at bases, no wheezes or crackles; symmetrical BS; normal respirations at rest  Abd: soft, non tender, no hepatosplenomegaly, no abd distension, no guarding or rigidity, bowel sounds heard  Neuro: Awake, alert and oriented now, moving all extremities, nonfocal exam  Extrm: no leg edema or swelling or clubbing  Skin: no rash  Lymphatic: no cervical or supraclavicular adenopathy    Telemetry: Normal sinus rhythm      Data review:     Recent Results (from the past 24 hour(s))   GLUCOSE, POC    Collection Time: 07/24/20 10:22 AM   Result Value Ref Range    Glucose (POC) 156 (H) 70 - 110 mg/dL   HGB & HCT    Collection Time: 07/24/20 10:30 AM   Result Value Ref Range    HGB 9.7 (L) 13.0 - 16.0 g/dL    HCT 28.7 (L) 36.0 - 48.0 %   TYPE & SCREEN    Collection Time: 07/24/20  4:40 PM   Result Value Ref Range    Crossmatch Expiration 07/27/2020     ABO/Rh(D) O POSITIVE     Antibody screen NEG     Unit number [id number]     Blood component type  LR     Unit division 00     Status of unit ISSUED     Crossmatch result Compatible     Unit number [id number]     Blood component type  LR     Unit division 00     Status of unit ISSUED     Crossmatch result Compatible     Unit number [id number]     Blood component type  LR     Unit division 00     Status of unit ALLOCATED     Crossmatch result Compatible    HGB & HCT    Collection Time: 07/24/20  4:40 PM   Result Value Ref Range    HGB 8.0 (L) 13.0 - 16.0 g/dL    HCT 24.1 (L) 36.0 - 48.0 %   AMMONIA    Collection Time: 07/24/20  6:40 PM   Result Value Ref Range    Ammonia 21 11 - 32 UMOL/L   METABOLIC PANEL, COMPREHENSIVE    Collection Time: 07/24/20  6:40 PM   Result Value Ref Range    Sodium 144 136 - 145 mmol/L    Potassium 3.8 3.5 - 5.5 mmol/L    Chloride 110 100 - 111 mmol/L    CO2 21 21 - 32 mmol/L    Anion gap 13 3.0 - 18 mmol/L    Glucose 265 (H) 74 - 99 mg/dL    BUN 25 (H) 7.0 - 18 MG/DL    Creatinine 1.40 (H) 0.6 - 1.3 MG/DL    BUN/Creatinine ratio 18 12 - 20      GFR est AA 59 (L) >60 ml/min/1.73m2    GFR est non-AA 49 (L) >60 ml/min/1.73m2    Calcium 7.7 (L) 8.5 - 10.1 MG/DL    Bilirubin, total 0.5 0.2 - 1.0 MG/DL    ALT (SGPT) 16 16 - 61 U/L    AST (SGOT) 14 10 - 38 U/L    Alk. phosphatase 54 45 - 117 U/L    Protein, total 5.0 (L) 6.4 - 8.2 g/dL    Albumin 2.6 (L) 3.4 - 5.0 g/dL    Globulin 2.4 2.0 - 4.0 g/dL    A-G Ratio 1.1 0.8 - 1.7     HGB & HCT    Collection Time: 07/24/20  6:40 PM   Result Value Ref Range    HGB 9.2 (L) 13.0 - 16.0 g/dL    HCT 27.3 (L) 36.0 - 48.0 %   POC G3    Collection Time: 07/24/20  8:43 PM   Result Value Ref Range    Device: NASAL CANNULA      Flow rate (POC) 6 L/M    FIO2 (POC) 0.44 %    pH (POC) 7.42 7.35 - 7.45      pCO2 (POC) 27.0 (L) 35.0 - 45.0 MMHG    pO2 (POC) 89 80 - 100 MMHG    HCO3 (POC) 17.4 (L) 22 - 26 MMOL/L    sO2 (POC) 97 92 - 97 %    Base deficit (POC) 7 mmol/L    Allens test (POC) YES      Total resp.  rate 20      Site RIGHT RADIAL      Patient temp. 98.1      Specimen type (POC) ARTERIAL      Performed by Cary Biggs, POC    Collection Time: 07/24/20 11:09 PM   Result Value Ref Range    Glucose (POC) 279 (H) 70 - 110 mg/dL   HGB & HCT    Collection Time: 07/24/20 11:41 PM   Result Value Ref Range    HGB 9.5 (L) 13.0 - 16.0 g/dL    HCT 28.6 (L) 36.0 - 43.7 %   METABOLIC PANEL, COMPREHENSIVE    Collection Time: 07/25/20  4:25 AM   Result Value Ref Range    Sodium 145 136 - 145 mmol/L Potassium 3.9 3.5 - 5.5 mmol/L    Chloride 114 (H) 100 - 111 mmol/L    CO2 20 (L) 21 - 32 mmol/L    Anion gap 11 3.0 - 18 mmol/L    Glucose 233 (H) 74 - 99 mg/dL    BUN 30 (H) 7.0 - 18 MG/DL    Creatinine 1.62 (H) 0.6 - 1.3 MG/DL    BUN/Creatinine ratio 19 12 - 20      GFR est AA 50 (L) >60 ml/min/1.73m2    GFR est non-AA 41 (L) >60 ml/min/1.73m2    Calcium 7.4 (L) 8.5 - 10.1 MG/DL    Bilirubin, total 0.4 0.2 - 1.0 MG/DL    ALT (SGPT) 12 (L) 16 - 61 U/L    AST (SGOT) 11 10 - 38 U/L    Alk. phosphatase 43 (L) 45 - 117 U/L    Protein, total 4.0 (L) 6.4 - 8.2 g/dL    Albumin 2.2 (L) 3.4 - 5.0 g/dL    Globulin 1.8 (L) 2.0 - 4.0 g/dL    A-G Ratio 1.2 0.8 - 1.7     MAGNESIUM    Collection Time: 07/25/20  4:25 AM   Result Value Ref Range    Magnesium 1.6 1.6 - 2.6 mg/dL   GLUCOSE, POC    Collection Time: 07/25/20  5:09 AM   Result Value Ref Range    Glucose (POC) 256 (H) 70 - 110 mg/dL   CBC WITH AUTOMATED DIFF    Collection Time: 07/25/20  5:19 AM   Result Value Ref Range    WBC 10.3 4.6 - 13.2 K/uL    RBC 2.47 (L) 4.70 - 5.50 M/uL    HGB 8.0 (L) 13.0 - 16.0 g/dL    HCT 23.8 (L) 36.0 - 48.0 %    MCV 96.4 74.0 - 97.0 FL    MCH 32.4 24.0 - 34.0 PG    MCHC 33.6 31.0 - 37.0 g/dL    RDW 16.0 (H) 11.6 - 14.5 %    PLATELET 693 722 - 264 K/uL    MPV 10.7 9.2 - 11.8 FL    NEUTROPHILS 69 40 - 73 %    LYMPHOCYTES 23 21 - 52 %    MONOCYTES 8 3 - 10 %    EOSINOPHILS 0 0 - 5 %    BASOPHILS 0 0 - 2 %    ABS. NEUTROPHILS 7.1 1.8 - 8.0 K/UL    ABS. LYMPHOCYTES 2.4 0.9 - 3.6 K/UL    ABS. MONOCYTES 0.8 0.05 - 1.2 K/UL    ABS. EOSINOPHILS 0.0 0.0 - 0.4 K/UL    ABS. BASOPHILS 0.0 0.0 - 0.1 K/UL    DF AUTOMATED     GLUCOSE, POC    Collection Time: 07/25/20  6:24 AM   Result Value Ref Range    Glucose (POC) 239 (H) 70 - 110 mg/dL           Recent Labs     07/24/20 2043   FIO2I 0.44   HCO3I 17.4*   PCO2I 27.0*   PHI 7.42   PO2I 89       All Micro Results     None          ECHO January 2018 [place]  OVERALL IMPRESSIONS:  1. The cardiac chambers are of normal size. 2.There is mild-to-moderate concentric left ventricular hypertrophy present with grade 1 diastolic relaxation abnormalities. The E/E' ratio is increased, suggesting elevated left atrial filling pressure. The regional wall motion is normal with an ejection fraction estimated to be 60% to 65%. 3.No significant stenotic or regurgitant valvular lesions are noted. 4.No right ventricular systolic pressure could be estimated in the absence of a full TR jet envelope. The right heart size and configuration are normal.  5.No clots, vegetations, or pericardial effusion are identified. Imaging:  [x]I have personally reviewed the patients chest radiographs images and report       Results from [place] encounter on 07/22/20   CT HEAD WO CONT    Narrative EXAM: CT head    INDICATION: Confusion    COMPARISON: None. TECHNIQUE: Axial CT imaging of the head was performed without intravenous  contrast. One or more dose reduction techniques were used on this CT: automated  exposure control, adjustment of the mAs and/or kVp according to patient size,  and iterative reconstruction techniques. The specific techniques used on this  CT exam have been documented in the patient's electronic medical record. Digital  Imaging and Communications in Medicine (DICOM) format image data are available  to nonaffiliated external healthcare facilities or entities on a secure, media  free, reciprocally searchable basis with patient authorization for at least a  12-month period after this study. _______________    FINDINGS:    BRAIN AND POSTERIOR FOSSA: The sulci, folia, ventricles and basal cisterns are  within normal limits for the patient's age. There is no intracranial hemorrhage,  mass effect, or midline shift. There are no areas of abnormal parenchymal  attenuation. EXTRA-AXIAL SPACES AND MENINGES: There are no abnormal extra-axial fluid  collections. CALVARIUM: Intact.     SINUSES: Clear.    OTHER: None.    _______________      Impression IMPRESSION:    No acute intracranial abnormalities. CT abdomen/pelvis with contrast 7/24/2020  COMPARISON: GI bleeding scan dated 7/23/2020  FINDINGS:  LOWER CHEST: Basilar atelectasis and/or scarring noted. Coronary artery  calcifications identified. LIVER, BILIARY: Liver is normal. No biliary dilation. Gallbladder is  unremarkable. PANCREAS: Normal.  SPLEEN: Normal.  ADRENALS: Normal.  KIDNEYS/URETERS/BLADDER: No suspicious renal masses or hydronephrosis. PELVIC ORGANS: Unremarkable. LYMPH NODES: No enlarged lymph nodes. GASTROINTESTINAL TRACT: Prior sigmoid colon resection with anastomosis in the  pelvis. Scattered diverticula identified. There are some scattered areas of  subtle hyperdensity within the stomach, duodenum, and portions of the distal  colon although are seen on both noncontrast and postcontrast imaging. No  convincing evidence of active GI bleeding identified. VASCULATURE: Atherosclerosis.   BONES: Degenerative changes throughout the spine with prior thoracolumbar  fusion. Left L2 transpedicular screw is positioned lateral to the vertebral  body. OTHER: None. IMPRESSION:  1. Prior sigmoid colon resection with anastomosis in the pelvis. Colonic  diverticulosis again identified. No convincing evidence of active GI bleeding  identified.  Scattered areas of hyperdensity within bowel lumen in the stomach as  well as portions of the colon seen on both precontrast and postcontrast imaging  are nonspecific.       Nuclear medicine RBC scan 7/23/2020  IMPRESSION:    No evidence of active GI bleeding.         IMPRESSION:   · Acute GI bleeding-likely lower GI from diverticulosis   · Severe anemia from GI bleeding  · Acute renal failure   · Type 2 diabetes mellitus  · FAUSTO on CPAP therapy  ·   Patient Active Problem List   Diagnosis Code    GI bleed K92.2    DM (diabetes mellitus) ([facility] [address] E11.9    Essential hypertension, malignant I10    Diverticulosis of colon without diverticulitis K57.30    HTN (hypertension), benign I10    Lumbar stenosis M48.061    Diverticulosis K57.90    Sleep apnea G47.30    Acute renal failure (ARF) (HCC) N17.9    Severe anemia D64.9     ·       RECOMMENDATIONS:   · Pulm: Stable respirations; wean oxygen for O2 sats greater than 91%; follow-up chest x-ray later this morning after central line and NG tube placement; prn bronchodilators if needed  · Cardiac: Watch hemodynamics and blood pressure; continue intravenous fluids and PRBCs as needed  · GI: Persistent GI bleeding; likely lower GI; plan for NG tube placement and rule out upper GI cause; discussed with Dr. Triston Talley who would like NM RBC scan repeated this morning; Dr. Kirkpatrick Covert surgery has also been consulted  · Hem: Watch H&H every 6 hours; keep hemoglobin greater than 7 g/dL; coags were normal; follow-up coags and fibrinogen today; status post PRBCs overnight  · ID: Patient on empiric Flagyl  · Renal: Watch I/O's; Place [place] catheter for accurate monitoring; watch renal function and avoid nephrotoxic medications  · Neuro: Stable mentation now; agitation overnight; patient denies any significant alcohol use  · Endo: Lantus baseline 10 units due to diabetes and elevated blood sugars; sliding scale insulin; follow blood sugars and watch for hypoglycemia  · Vasc: Patient will need central line due to recurrent large-volume GI bleeding  · Fluids: Normal saline maintenance 125 mL /hr  · Nutrition:  NGT, NPO for now  · Proph:  DVt-prior history of left leg DVT; check ultrasound legs before SCDs; GI proph- PPI  · Discussed with patient and updated management plans. I will also try and reach out to patient's son.    Will defer respective systems problem management to primary and other consultant and follow patient in ICU with primary and other medical team  Further recommendations will be based on the patient's response to recommended treatment and results of the investigation ordered. Quality Care: PPI, DVT prophylaxis, HOB elevated, Infection control all reviewed and addressed.   PAIN AND SEDATION: none   · Skin/Wound: no active issues  · Nutrition:  N.p.o. for now   · Prophylaxis: DVT and GI Prophylaxis reviewed  · Restraints: none  · PT/OT eval and treat: as needed  · Lines/Tubes: PIVs  ADVANCE DIRECTIVE: Full Code    · Thank you for the consult      High complexity decision making was performed in this consultation and evaluation of this patient who is at high risk for decompensation with multiple organ involvement  Total critical care time spent rendering care exclusive of procedures: 42 minutes     Jeremie Duarte MD

## 2020-07-25 NOTE — PROGRESS NOTES
Gastrointestinal Progress Note    Patient Name: Pradeep Woody    PYUDK'X Date: 7/25/2020    Admit Date: 7/22/2020    Subjective:     Diet: Patient is on NPO and is tolerating. Nausea is not present. Vomiting is not present. Pain: Patient does not complain of abdominal pain. Bowel Movements: mara bloody at 8 am in large quantity.  He claim that he had 6 to 7 rectal bleeding since this started in Wednesday    Bleeding:  bright red blood per rectum    Current Facility-Administered Medications   Medication Dose Route Frequency    0.9% sodium chloride infusion  125 mL/hr IntraVENous CONTINUOUS    albumin human 25% (BUMINATE) solution 12.5 g  12.5 g IntraVENous Q6H    0.9% sodium chloride infusion 250 mL  250 mL IntraVENous PRN    carboxymethylcellulose sodium (REFRESH PLUS) 0.5 % ophthalmic solution 1 Drop  1 Drop Both Eyes BID    polyvinyl alcohol-povidon(PF) (REFRESH CLASSIC) 1.4-0.6 % ophthalmic solution 1-2 Drop  1-2 Drop Both Eyes PRN    [Held by provider] vitamin Q-F-N-lutein-minerals (OCUVITE) tablet 1 Tab  1 Tab Oral BID    pantoprazole (PROTONIX) 40 mg in 0.9% sodium chloride 10 mL injection  40 mg IntraVENous Q12H    0.9% sodium chloride infusion 250 mL  250 mL IntraVENous PRN    0.9% sodium chloride infusion 250 mL  250 mL IntraVENous PRN    lidocaine 4 % patch 1 Patch  1 Patch TransDERmal Q24H    acetaminophen (TYLENOL) tablet 500 mg  500 mg Oral Q4H PRN    [Held by provider] allopurinoL (ZYLOPRIM) tablet 100 mg  100 mg Oral BID    glucose chewable tablet 16 g  4 Tab Oral PRN    glucagon (GLUCAGEN) injection 1 mg  1 mg IntraMUSCular PRN    dextrose 10% infusion 125-250 mL  125-250 mL IntraVENous PRN    sodium chloride (NS) flush 5-40 mL  5-40 mL IntraVENous Q8H    sodium chloride (NS) flush 5-40 mL  5-40 mL IntraVENous PRN    ondansetron (ZOFRAN) injection 4 mg  4 mg IntraVENous Q6H PRN    0.9% sodium chloride infusion 250 mL  250 mL IntraVENous PRN    albuterol (PROVENTIL HFA, VENTOLIN HFA, PROAIR HFA) inhaler 2 Puff  2 Puff Inhalation DAILY PRN    [Held by provider] atorvastatin (LIPITOR) tablet 10 mg  10 mg Oral QHS    [Held by provider] metoprolol succinate (TOPROL-XL) tablet 100 mg  100 mg Oral DAILY    [Held by provider] telmisartan (MICARDIS) tablet 80 mg  80 mg Oral DAILY    insulin glargine (LANTUS) injection 10 Units  10 Units SubCUTAneous DAILY    insulin lispro (HUMALOG) injection   SubCUTAneous AC&HS    metroNIDAZOLE (FLAGYL) IVPB premix 500 mg  500 mg IntraVENous Q8H          Objective:     Visit Vitals  BP 96/52   Pulse 88   Temp 98.1 °F (36.7 °C)   Resp 18   Ht 5' 9\" (1.753 m)   Wt 90.7 kg (200 lb)   SpO2 100%   BMI 29.53 kg/m²       07/23 1901 - 07/25 0700  In: 860 [P.O.:240]  Out: 201 [Urine:200]    General appearance: alert, fatigued, cooperative, no distress, slowed mentation, appears stated age, moderately obese, oriented x 3  Eyes: negative except for pale conjunctiva. Naso gastric tube in the stomach no blood  Lungs: clear to auscultation bilaterally  Abdomen: soft, non-tender. Bowel sounds normal. No masses,  no organomegaly. Ennis catheter in place with clear urine  Extremities: extremities normal, atraumatic, no cyanosis or edema  Skin: Skin color, texture, turgor normal. No rashes or lesions  Neurologic: Alert and oriented X 3, moves 4 extremities.  No asterixis  Data Review:    Labs: Results:       Chemistry Recent Labs     07/25/20  0425 07/24/20  1840 07/24/20  0300   * 265* 86    144 144   K 3.9 3.8 3.6   * 110 112*   CO2 20* 21 29   BUN 30* 25* 24*   CREA 1.62* 1.40* 1.29   CA 7.4* 7.7* 8.2*   AGAP 11 13 3   BUCR 19 18 19   AP 43* 54 58   TP 4.0* 5.0* 5.1*   ALB 2.2* 2.6* 2.7*   GLOB 1.8* 2.4 2.4   AGRAT 1.2 1.1 1.1      CBC w/Diff Recent Labs     07/25/20  0519 07/24/20  2341 07/24/20  1840  07/24/20  0300  07/23/20  0545   WBC 10.3  --   --   --  5.3  --  5.9   RBC 2.47*  --   --   --  3.03*  --  3.51*   HGB 8.0* 9.5* 9.2*   < > 10.4*   < > 12.0*   HCT 23.8* 28.6* 27.3*   < > 30.6*   < > 35.5*     --   --   --  167  --  188   GRANS 69  --   --   --  44  --  54   LYMPH 23  --   --   --  39  --  34   EOS 0  --   --   --  7*  --  3    < > = values in this interval not displayed. Coagulation Recent Labs     07/22/20  2333   PTP 14.9   INR 1.2   APTT 28.6       Liver Enzymes Recent Labs     07/25/20  0425   TP 4.0*   ALB 2.2*   AP 43*          Assessment:     Active Problems:    DM (diabetes mellitus) ([facility].) (10/7/2012)      Diverticulosis (7/23/2020)      Sleep apnea (7/23/2020)      Acute renal failure (ARF) ([facility] (7/25/2020)      Severe anemia (7/25/2020)        Plan:     Acute severe \"lower \" GI bleeding started 3 days ago so far he got a total of 2 blood transfusions? He is getting 2 more now. He got a litter of fluid this morning Plan would do EGD and colonoscopy to find from where he is bleeding most likely lower. Acute blood loss anemia: Hgb presently 8 after 2 blood transfusions. AMS resolved probably was metabolic related to the severity of the GI bleeding and hypotension.  CT scan of the head was negative  Acute on CKD              M Chet Brown MD  July 25, 2020

## 2020-07-25 NOTE — PROGRESS NOTES
Hospitalist Progress Note    Patient: Marivel Kraft MRN: [id number]  CSN: [id number]    YOB: 1940  Age: [de-identified] y.o. Sex: male    DOA: 7/22/2020 LOS:  LOS: 2 days          Chief Complaint:    GI bleed      Assessment/Plan   [de-identified] yo AAM with gi bleed, admitted 7/22, had neg bleed scan, started having more bleeding yesterday, had near syncope in toilet, then climbed out of bed over rail and walked down rodriguez with rectal bleeding and almost passed out near nurses' station  Transferred to ICU yesterday afternoon  Colonoscopy and EGD cancelled due to his delerium    Severe GI bleed  Acute blood loss anemia  Diverticulosis with partial colectomy in past  Hypovolemic shock  Toxic metabolic encephalopathy due to severe ac blood loss  NIDDM  CHEY  Hypoalbuminemia    PRBC 2 units this am  IV albumin  2 units plasma   Recheck INR  IV NS bolus, possible CVL to be placed  GI requests nuclear bleed scan, and NG tube for aspirate  Continue IV protonix    ICU level care, he is mentating more appropriately this am and is oriented    Consult to surgery  Discussed with intensivist this am and nursing    Orders placed  Complex bleeding situation with negative CTA abd yesterday and nuc bleed scan prior  Will need close monitoring of renal function if further contrast given    Guarded prognosis  I updated son by phone last night and answered his questions    Critical Care Note    Assessment and Plan by system:    CVS:HR stable, hold PO BP meds  Resp:02 sats ok with NC 02, no resp distress  GI:as above  Neuro:mentation better this am  :manage strict I/O  Endo:hypergluycemia, SSI PRN  Heme:as above, blood, plasma, albumin  ID:no infection sx    F/E/N:IVF, bolus and maintenance  DVT proph:SCD      45 minutes of critical care time spent in the direct evaluation and treatment of this high risk patient.  The reason for providing this level of medical care for this critically ill patient was due a critical illness that impaired one or more vital organ systems such that there was a high probability of imminent or life threatening deterioration in the patients condition. This care involved high complexity decision making to assess, manipulate, and support vital system functions, to treat this degreee vital organ system failure and to prevent further life threatening deterioration of the patients condition. Disposition :  Patient Active Problem List   Diagnosis Code    GI bleed K92.2    [facility] E11.9    Essential hypertension, malignant I10    Diverticulosis of colon without diverticulitis K57.30    Surgery, elective Z41.9    HTN (hypertension), benign I10    Lumbar stenosis with neurogenic claudication M48.062    Lumbar stenosis M48.061    Diverticulosis K57.90    Lower GI bleed K92.2    Sleep apnea G47.30    Hypovolemic shock (HCC) R57.1       Subjective:    Bleeding at nuc med this am, he was not able to tolerate lying for scan, uncomfortable with bleeding  Denies pain in abd, CP< SOB, dizziness    Review of systems:    Constitutional: denies fevers, chills  Respiratory: denies SOB  Cardiovascular: denies  palpitations  Gastrointestinal: denies nausea, vomiting      Vital signs/Intake and Output:  Visit Vitals  BP 96/52   Pulse 88   Temp 98.1 °F (36.7 °C)   Resp 18   Ht 5' 9\" (1.753 m)   Wt 90.7 kg (200 lb)   SpO2 100%   BMI 29.53 kg/m²     Current Shift:  No intake/output data recorded.   Last three shifts:  07/23 1901 - 07/25 0700  In: 860 [P.O.:240]  Out: 201 [Urine:200]    Exam:    General: elderly AAM alert, NAD, OX3  Head/Neck: NCAT, supple, No masses, No lymphadenopathy  CVS:Regular rate and rhythm, no M/R/G, S1/S2 heard, no thrill  Lungs:Clear to auscultation bilaterally, no wheezes, rhonchi, or rales  Abdomen: Soft, Nontender, No distention, Normal Bowel sounds, No hepatomegaly  Extremities: No C/C/E, pulses palpable 2+  Skin:pale, not diaphoretic  Neuro:grossly normal , follows commands  Psych:appropriate  Large bloody BM in bed this am, with clots                Labs: Results:       Chemistry Recent Labs     07/25/20 0425 07/24/20 1840 07/24/20  0300   * 265* 86    144 144   K 3.9 3.8 3.6   * 110 112*   CO2 20* 21 29   BUN 30* 25* 24*   CREA 1.62* 1.40* 1.29   CA 7.4* 7.7* 8.2*   AGAP 11 13 3   BUCR 19 18 19   AP 43* 54 58   TP 4.0* 5.0* 5.1*   ALB 2.2* 2.6* 2.7*   GLOB 1.8* 2.4 2.4   AGRAT 1.2 1.1 1.1      CBC w/Diff Recent Labs     07/25/20  0519 07/24/20 2341 07/24/20 1840 07/24/20  0300  07/23/20  0545   WBC 10.3  --   --   --  5.3  --  5.9   RBC 2.47*  --   --   --  3.03*  --  3.51*   HGB 8.0* 9.5* 9.2*   < > 10.4*   < > 12.0*   HCT 23.8* 28.6* 27.3*   < > 30.6*   < > 35.5*     --   --   --  167  --  188   GRANS 69  --   --   --  44  --  54   LYMPH 23  --   --   --  39  --  34   EOS 0  --   --   --  7*  --  3    < > = values in this interval not displayed. Cardiac Enzymes Recent Labs     07/22/20 2333      CKND1 2.0      Coagulation Recent Labs     07/22/20 2333   PTP 14.9   INR 1.2   APTT 28.6       Lipid Panel No results found for: CHOL, CHOLPOCT, CHOLX, CHLST, CHOLV, 356905, HDL, HDLP, LDL, LDLC, DLDLP, 231285, VLDLC, VLDL, TGLX, TRIGL, TRIGP, TGLPOCT, CHHD, CHHDX   BNP No results for input(s): BNPP in the last 72 hours.    Liver Enzymes Recent Labs     07/25/20  0425   TP 4.0*   ALB 2.2*   AP 43*      Thyroid Studies No results found for: T4, T3U, TSH, TSHEXT, TSHEXT     Procedures/imaging: see electronic medical records for all procedures/Xrays and details which were not copied into this note but were reviewed prior to creation of Shanell Coon MD

## 2020-07-25 NOTE — PROGRESS NOTES
Patient placed on Hospital owned CPAP Machine but not tolerated for long due to constant confusion. NC worn most of the night without any difficulty or SOB.

## 2020-07-25 NOTE — PROGRESS NOTES
Hospitalist Progress Note    Patient: Carter Nuñez MRN: [id number]  [facility]: [id number]    YOB: 1940  Age: [de-identified] y.o. Sex: male    DOA: 7/22/2020 LOS:  LOS: 2 days          On unit this am, Mr Beny Perla is back from [facility] where he had large bloody BM and was unable to lie comfortably to complete bleeding scan.     Hgb 8, Cr 1.6    BP [id number] systolic    Proceed with NS bolus, IV albumin, 2 units blood, plasma, and ask surgery to see also    May need CVL and pressor support, but proceed with volume first and blood    He is awake and talking, seems less delerious than yesterday afternoon    Critical situation, GI on case, labs reviewed    Continue IVF maintenance    Xochitl Graham MD

## 2020-07-25 NOTE — PROCEDURES
[facility]  Colonoscopy Procedure Report  _______________________________________________________  Patient: Jennifer Bhatti                                        Attending Physician: Brenden Fernandez MD    Patient ID: [id number]                                    Referring Physician: Moise Mccoy MD    Exam Date: 7/25/2020      Introduction: A  [de-identified] y.o. male patient, presents for inpatient Colonoscopy     Indications: Recurring severe lower GI bleeding with significant blood loss anemia and hypotension started 3 days ago. Had partial sigmoid resection 33.5 cm in 2016 for acute on chronic diverticulitis. EGD and CT angio of the abdomen were negative. Consent: The benefits, risks, and alternatives to the procedure were discussed and informed consent was obtained from the patient and yesterday from the son. Preparation: EKG, pulse, pulse oximetry and blood pressure were monitored throughout the procedure. ASA Classification: Class III- . The heart is an S1-S2 and regular heart rate and rhythm. Lungs are clear to auscultation and percussion. Abdomen is soft, nondistended, and nontender. Mental Status: awake, alert, and oriented to person, place, and time    Medications:  · Fentanyl 100 mcg IV throughout the procedure. Previously sedated for the EGD    Rectal Exam: Normal Rectal Exam. old Blood. Prostate slightly enlarged. Pathology Specimens:  0    Procedure done in ICU:  The colonoscope was passed with ease through the anus under direct visualization and advanced to the cecum and 5 cm inside the terminal ileum. The scope was withdrawn and the mucosa was carefully examined. The quality of the preparation was poor. The views were good. The patient's toleration of the procedure was excellent. The exam was done twice to the cecum. Total time is 34 minutes and withdrawal time is 21 minutes.     Findings:  Old blood was prent throughout the entire colon but not in the terminal ileum  Rectum: Small internal hemorrhoids. Sigmoid:   Sp partial colectomy with latero terminal anastomosis at 20 cm. Descending Colon: Moderate diverticulosis in the descending colon. A small 5 mm sessile polyp not removed  Transverse Colon: Moderate transverse colon diverticulosis but in the distal transverse colon at 66 cm there an adherent clot at the entrance of a small diverticulum attached to a small non bleeding 2 mm ulcer centered by a small visible vessel that was exposed after removing the adherent clot. One clip applied to that ulcer. Then the surrounding area is tattooed with faisal ink for future reference. There is a couple of adjacent tattos present in the very distal transverse colon next to a 6 mm sessile polyp located 4 cm distal to the clip. The polyp is not removed  Ascending Colon: Moderate diverticulosis of the ascending colon. Cecum: Moderate diverticulosis in the cecum but at the entrance of one diverticulum in the cecum, there are 2 adjacent 4 mm cm sessile polyps not removed  Terminal Ileum:   Normal no blood present      Unplanned Events: There were no unplanned events. Estimated Blood Loss: None  Impressions: Prostate slightly enlarged. Presence of large quantity of Old blood throughout the entire colon but not in the terminal ileum that had to be cleaned and suctioned. Small internal hemorrhoids. Sp partial colectomy with latero terminal anastomosis located in the sigmoid at 20 cm. Moderate diverticulosis in the descending colon. A small 5 mm sessile polyp not removed. Moderate transverse colon diverticulosis but in the distal transverse colon at 66 cm there is a small adherent clot at the entrance of a small diverticulum attached to a very small non bleeding 2 mm ulcer centered by a small visible vessel that was exposed after removing the adherent clot. One clip was applied to that ulcer without provocation of bleeding.  Then the surrounding area is tattooed with faisal ink for future reference. There is a couple of adjacent tattos present in the very distal transverse colon next to a 6 mm sessile polyp located 4 cm distal to the above clip. The polyp is not removed. Moderate diverticulosis of the ascending colon. Moderate diverticulosis in the cecum but at the entrance of one diverticulum in the cecum, there are 2 adjacent 4 mm cm sessile polyps not removed. Normal Mucosa. Complications: None; patient tolerated the procedure well. Recommendations:  · Monitor for 24 hours to assure no more bleeding  · Resume a soft diet for now. · Resume own medications. Avoid all NSAID's for life. Can resume the baby ASA in 3 days  · Repeat Colonoscopy as an outpatient to remove all the polyps and do a better colon exam without the presence of blood.   · Take Miralax and/ or Colace 100 mg on regular basis to avoid being constipated    Procedure Codes:    · [place] BLEEDING [[id number]]  · COLONOSCPY,FLEX,W/DIR [facility] INJECT [[id number]]    Endoscope Information:  Model Number(s)    [id number]   Assistant: None  Signed By: Kyleigh Man MD Date: 7/25/2020

## 2020-07-26 ENCOUNTER — APPOINTMENT (OUTPATIENT)
Dept: VASCULAR SURGERY | Age: 80
DRG: 377 | End: 2020-07-26
Attending: INTERNAL MEDICINE
Payer: MEDICARE

## 2020-07-26 LAB
ABO + RH BLD: NORMAL
ALBUMIN SERPL-MCNC: 2.6 G/DL (ref 3.4–5)
ALBUMIN/GLOB SERPL: 1.4 {RATIO} (ref 0.8–1.7)
ALP SERPL-CCNC: 46 U/L (ref 45–117)
ALT SERPL-CCNC: 14 U/L (ref 16–61)
ANION GAP SERPL CALC-SCNC: 6 MMOL/L (ref 3–18)
ANION GAP SERPL CALC-SCNC: 6 MMOL/L (ref 3–18)
AST SERPL-CCNC: 20 U/L (ref 10–38)
BASOPHILS # BLD: 0 K/UL (ref 0–0.1)
BASOPHILS NFR BLD: 0 % (ref 0–2)
BILIRUB SERPL-MCNC: 0.5 MG/DL (ref 0.2–1)
BLD PROD TYP BPU: NORMAL
BLOOD GROUP ANTIBODIES SERPL: NORMAL
BPU ID: NORMAL
BUN SERPL-MCNC: 15 MG/DL (ref 7–18)
BUN SERPL-MCNC: 18 MG/DL (ref 7–18)
BUN/CREAT SERPL: 13 (ref 12–20)
BUN/CREAT SERPL: 15 (ref 12–20)
CALCIUM SERPL-MCNC: 7.2 MG/DL (ref 8.5–10.1)
CALCIUM SERPL-MCNC: 7.3 MG/DL (ref 8.5–10.1)
CHLORIDE SERPL-SCNC: 114 MMOL/L (ref 100–111)
CHLORIDE SERPL-SCNC: 117 MMOL/L (ref 100–111)
CO2 SERPL-SCNC: 23 MMOL/L (ref 21–32)
CO2 SERPL-SCNC: 25 MMOL/L (ref 21–32)
CREAT SERPL-MCNC: 1.15 MG/DL (ref 0.6–1.3)
CREAT SERPL-MCNC: 1.18 MG/DL (ref 0.6–1.3)
CROSSMATCH RESULT,%XM: NORMAL
DIFFERENTIAL METHOD BLD: ABNORMAL
EOSINOPHIL # BLD: 0.3 K/UL (ref 0–0.4)
EOSINOPHIL NFR BLD: 3 % (ref 0–5)
ERYTHROCYTE [DISTWIDTH] IN BLOOD BY AUTOMATED COUNT: 16.1 % (ref 11.6–14.5)
GLOBULIN SER CALC-MCNC: 1.9 G/DL (ref 2–4)
GLUCOSE BLD STRIP.AUTO-MCNC: 100 MG/DL (ref 70–110)
GLUCOSE BLD STRIP.AUTO-MCNC: 112 MG/DL (ref 70–110)
GLUCOSE BLD STRIP.AUTO-MCNC: 172 MG/DL (ref 70–110)
GLUCOSE BLD STRIP.AUTO-MCNC: 199 MG/DL (ref 70–110)
GLUCOSE SERPL-MCNC: 133 MG/DL (ref 74–99)
GLUCOSE SERPL-MCNC: 73 MG/DL (ref 74–99)
HCT VFR BLD AUTO: 21.2 % (ref 36–48)
HCT VFR BLD AUTO: 23.8 % (ref 36–48)
HCT VFR BLD AUTO: 25.8 % (ref 36–48)
HGB BLD-MCNC: 7.3 G/DL (ref 13–16)
HGB BLD-MCNC: 8.2 G/DL (ref 13–16)
HGB BLD-MCNC: 8.8 G/DL (ref 13–16)
INR PPP: 1.3 (ref 0.8–1.2)
LYMPHOCYTES # BLD: 1.8 K/UL (ref 0.9–3.6)
LYMPHOCYTES NFR BLD: 17 % (ref 21–52)
MAGNESIUM SERPL-MCNC: 1.5 MG/DL (ref 1.6–2.6)
MAGNESIUM SERPL-MCNC: 1.7 MG/DL (ref 1.6–2.6)
MCH RBC QN AUTO: 32.9 PG (ref 24–34)
MCHC RBC AUTO-ENTMCNC: 34.4 G/DL (ref 31–37)
MCV RBC AUTO: 95.5 FL (ref 74–97)
MONOCYTES # BLD: 0.8 K/UL (ref 0.05–1.2)
MONOCYTES NFR BLD: 8 % (ref 3–10)
NEUTS SEG # BLD: 7.9 K/UL (ref 1.8–8)
NEUTS SEG NFR BLD: 72 % (ref 40–73)
PLATELET # BLD AUTO: 115 K/UL (ref 135–420)
PMV BLD AUTO: 10.8 FL (ref 9.2–11.8)
POTASSIUM SERPL-SCNC: 2.8 MMOL/L (ref 3.5–5.5)
POTASSIUM SERPL-SCNC: 3.1 MMOL/L (ref 3.5–5.5)
PROT SERPL-MCNC: 4.5 G/DL (ref 6.4–8.2)
PROTHROMBIN TIME: 16.3 SEC (ref 11.5–15.2)
RBC # BLD AUTO: 2.22 M/UL (ref 4.7–5.5)
SODIUM SERPL-SCNC: 143 MMOL/L (ref 136–145)
SODIUM SERPL-SCNC: 148 MMOL/L (ref 136–145)
SPECIMEN EXP DATE BLD: NORMAL
STATUS OF UNIT,%ST: NORMAL
UNIT DIVISION, %UDIV: 0
WBC # BLD AUTO: 10.8 K/UL (ref 4.6–13.2)

## 2020-07-26 PROCEDURE — 80053 COMPREHEN METABOLIC PANEL: CPT

## 2020-07-26 PROCEDURE — 74011250637 HC RX REV CODE- 250/637: Performed by: FAMILY MEDICINE

## 2020-07-26 PROCEDURE — 36430 TRANSFUSION BLD/BLD COMPNT: CPT

## 2020-07-26 PROCEDURE — 74011250637 HC RX REV CODE- 250/637: Performed by: INTERNAL MEDICINE

## 2020-07-26 PROCEDURE — 36591 DRAW BLOOD OFF VENOUS DEVICE: CPT

## 2020-07-26 PROCEDURE — 85025 COMPLETE CBC W/AUTO DIFF WBC: CPT

## 2020-07-26 PROCEDURE — 83735 ASSAY OF MAGNESIUM: CPT

## 2020-07-26 PROCEDURE — 65610000006 HC RM INTENSIVE CARE

## 2020-07-26 PROCEDURE — 74011636637 HC RX REV CODE- 636/637: Performed by: HOSPITALIST

## 2020-07-26 PROCEDURE — 74011000258 HC RX REV CODE- 258: Performed by: INTERNAL MEDICINE

## 2020-07-26 PROCEDURE — 77010033678 HC OXYGEN DAILY

## 2020-07-26 PROCEDURE — 93970 EXTREMITY STUDY: CPT

## 2020-07-26 PROCEDURE — 82962 GLUCOSE BLOOD TEST: CPT

## 2020-07-26 PROCEDURE — 74011250636 HC RX REV CODE- 250/636: Performed by: FAMILY MEDICINE

## 2020-07-26 PROCEDURE — 74011000250 HC RX REV CODE- 250: Performed by: FAMILY MEDICINE

## 2020-07-26 PROCEDURE — 85610 PROTHROMBIN TIME: CPT

## 2020-07-26 PROCEDURE — C9113 INJ PANTOPRAZOLE SODIUM, VIA: HCPCS | Performed by: INTERNAL MEDICINE

## 2020-07-26 PROCEDURE — 74011250637 HC RX REV CODE- 250/637: Performed by: HOSPITALIST

## 2020-07-26 PROCEDURE — 85018 HEMOGLOBIN: CPT

## 2020-07-26 PROCEDURE — 74011000250 HC RX REV CODE- 250: Performed by: INTERNAL MEDICINE

## 2020-07-26 PROCEDURE — P9016 RBC LEUKOCYTES REDUCED: HCPCS

## 2020-07-26 PROCEDURE — 74011250636 HC RX REV CODE- 250/636: Performed by: INTERNAL MEDICINE

## 2020-07-26 RX ORDER — MAGNESIUM SULFATE 1 G/100ML
1 INJECTION INTRAVENOUS ONCE
Status: COMPLETED | OUTPATIENT
Start: 2020-07-26 | End: 2020-07-26

## 2020-07-26 RX ORDER — FUROSEMIDE 10 MG/ML
20 INJECTION INTRAMUSCULAR; INTRAVENOUS ONCE
Status: COMPLETED | OUTPATIENT
Start: 2020-07-26 | End: 2020-07-26

## 2020-07-26 RX ORDER — POTASSIUM CHLORIDE AND SODIUM CHLORIDE 450; 150 MG/100ML; MG/100ML
INJECTION, SOLUTION INTRAVENOUS CONTINUOUS
Status: DISCONTINUED | OUTPATIENT
Start: 2020-07-26 | End: 2020-07-27 | Stop reason: HOSPADM

## 2020-07-26 RX ORDER — POTASSIUM CHLORIDE 7.45 MG/ML
10 INJECTION INTRAVENOUS ONCE
Status: COMPLETED | OUTPATIENT
Start: 2020-07-26 | End: 2020-07-26

## 2020-07-26 RX ORDER — SODIUM CHLORIDE 9 MG/ML
250 INJECTION, SOLUTION INTRAVENOUS AS NEEDED
Status: DISCONTINUED | OUTPATIENT
Start: 2020-07-26 | End: 2020-07-27 | Stop reason: SDUPTHER

## 2020-07-26 RX ORDER — MAGNESIUM SULFATE HEPTAHYDRATE 40 MG/ML
2 INJECTION, SOLUTION INTRAVENOUS ONCE
Status: COMPLETED | OUTPATIENT
Start: 2020-07-26 | End: 2020-07-26

## 2020-07-26 RX ORDER — POTASSIUM CHLORIDE 7.45 MG/ML
10 INJECTION INTRAVENOUS
Status: COMPLETED | OUTPATIENT
Start: 2020-07-26 | End: 2020-07-26

## 2020-07-26 RX ORDER — POTASSIUM CHLORIDE 20 MEQ/1
40 TABLET, EXTENDED RELEASE ORAL
Status: COMPLETED | OUTPATIENT
Start: 2020-07-26 | End: 2020-07-26

## 2020-07-26 RX ORDER — POTASSIUM CHLORIDE 7.45 MG/ML
10 INJECTION INTRAVENOUS
Status: DISCONTINUED | OUTPATIENT
Start: 2020-07-26 | End: 2020-07-26

## 2020-07-26 RX ADMIN — TELMISARTAN 80 MG: 80 TABLET ORAL at 08:52

## 2020-07-26 RX ADMIN — INSULIN LISPRO 2 UNITS: 100 INJECTION, SOLUTION INTRAVENOUS; SUBCUTANEOUS at 11:27

## 2020-07-26 RX ADMIN — CARBOXYMETHYLCELLULOSE SODIUM 1 DROP: 5 SOLUTION/ DROPS OPHTHALMIC at 21:00

## 2020-07-26 RX ADMIN — INSULIN LISPRO 2 UNITS: 100 INJECTION, SOLUTION INTRAVENOUS; SUBCUTANEOUS at 21:53

## 2020-07-26 RX ADMIN — Medication 1 TABLET: at 08:51

## 2020-07-26 RX ADMIN — SODIUM CHLORIDE AND POTASSIUM CHLORIDE: 4.5; 1.49 INJECTION, SOLUTION INTRAVENOUS at 23:57

## 2020-07-26 RX ADMIN — POTASSIUM CHLORIDE 10 MEQ: 7.46 INJECTION, SOLUTION INTRAVENOUS at 16:09

## 2020-07-26 RX ADMIN — Medication 1 TABLET: at 20:26

## 2020-07-26 RX ADMIN — PHYTONADIONE 5 MG: 10 INJECTION, EMULSION INTRAMUSCULAR; INTRAVENOUS; SUBCUTANEOUS at 16:31

## 2020-07-26 RX ADMIN — SODIUM CHLORIDE 40 MG: 9 INJECTION, SOLUTION INTRAMUSCULAR; INTRAVENOUS; SUBCUTANEOUS at 08:50

## 2020-07-26 RX ADMIN — SODIUM CHLORIDE AND POTASSIUM CHLORIDE: 4.5; 1.49 INJECTION, SOLUTION INTRAVENOUS at 10:29

## 2020-07-26 RX ADMIN — POTASSIUM CHLORIDE 10 MEQ: 7.46 INJECTION, SOLUTION INTRAVENOUS at 14:58

## 2020-07-26 RX ADMIN — ACETAMINOPHEN 500 MG: 500 TABLET ORAL at 16:39

## 2020-07-26 RX ADMIN — ATORVASTATIN CALCIUM 10 MG: 20 TABLET, FILM COATED ORAL at 22:00

## 2020-07-26 RX ADMIN — Medication 10 ML: at 21:54

## 2020-07-26 RX ADMIN — POTASSIUM CHLORIDE 10 MEQ: 7.46 INJECTION, SOLUTION INTRAVENOUS at 09:03

## 2020-07-26 RX ADMIN — ALLOPURINOL 100 MG: 100 TABLET ORAL at 08:51

## 2020-07-26 RX ADMIN — POTASSIUM CHLORIDE 10 MEQ: 7.46 INJECTION, SOLUTION INTRAVENOUS at 07:58

## 2020-07-26 RX ADMIN — CARBOXYMETHYLCELLULOSE SODIUM 1 DROP: 5 SOLUTION/ DROPS OPHTHALMIC at 09:00

## 2020-07-26 RX ADMIN — POTASSIUM CHLORIDE 40 MEQ: 1500 TABLET, EXTENDED RELEASE ORAL at 08:51

## 2020-07-26 RX ADMIN — MAGNESIUM SULFATE HEPTAHYDRATE 2 G: 40 INJECTION, SOLUTION INTRAVENOUS at 07:58

## 2020-07-26 RX ADMIN — MAGNESIUM SULFATE HEPTAHYDRATE 1 G: 1 INJECTION, SOLUTION INTRAVENOUS at 14:58

## 2020-07-26 RX ADMIN — POTASSIUM CHLORIDE 40 MEQ: 1500 TABLET, EXTENDED RELEASE ORAL at 14:58

## 2020-07-26 RX ADMIN — INSULIN GLARGINE 10 UNITS: 100 INJECTION, SOLUTION SUBCUTANEOUS at 08:52

## 2020-07-26 RX ADMIN — FUROSEMIDE 20 MG: 10 INJECTION, SOLUTION INTRAMUSCULAR; INTRAVENOUS at 10:29

## 2020-07-26 RX ADMIN — Medication 10 ML: at 13:37

## 2020-07-26 RX ADMIN — SODIUM CHLORIDE 40 MG: 9 INJECTION, SOLUTION INTRAMUSCULAR; INTRAVENOUS; SUBCUTANEOUS at 20:27

## 2020-07-26 RX ADMIN — ALLOPURINOL 100 MG: 100 TABLET ORAL at 20:27

## 2020-07-26 NOTE — PROGRESS NOTES
1930- Report and care received, assessment completed per flow sheet. Alert, oriented, day RN reported intermittent confusion. Sitter at bedside. 2100- Paranoid, accusing staff of taking belongings, tampering with IVF, of being in [place] with each other. Would not allow nurse to see blood band. Teofilo Carter in to see. Patient allowed RN to see blood band afterwards, however, remains highly suspicious of staff despite attempts to reassure. 2300- Remains argumentative, asking to speak with \"Head nurse\", charge nurse in to see. Made charge nurse change IVF bag, remained suspicious of IVF afterwards. 0030- Reassessment completed and without change. Received a phone call from patient who thought he was calling the police. Attempted to reassure patient, however, raised voice stating \"Don't make me hurt somebody. \". Security notified, in to see, patient told officer to leave because they were not the police. Told nurse and NA to leave him alone. 0400- Resting quietly with eyes closed, NAD.    0630- Refusing potassium despite explanation. Argumentative, paranoid.  paged. [facility] Teofilo Carter in to see. Agitated, remains argumentative, told Teofilo Carter that he doesn't even think she's a doctor. Continues to adamantly refuse potassium. States he's waiting for the doctor to round.

## 2020-07-26 NOTE — PROGRESS NOTES
[facility]  Pulmonary, Critical Care, and Sleep Medicine    Pulm/CC Note    Name: Diony Brady MRN: [id number]   : 1940 Hospital: [facility] [place]   Date: 2020  Room: [facility]/     Subjective: This patient has been seen and evaluated at the request of Dr. Errol Galeazzi for GI bleeding needing icu admission. Patient is a [de-identified] y.o. male with history of diverticulosis, type 2 diabetes mellitus, hypertension, obstructive sleep apnea on CPAP therapy and history of DVT in the past, currently not on anticoagulation therapy. The patient has history of significant lower GI bleeding in 2016, and underwent sigmoid colectomy by Dr. Dong Maldonado for diverticular bleeding. The patient was admitted on  early morning with bloody bowel movements. He appears to have recurrent bloody BMs. He has had CT abdomen and nuclear medicine scan so far-nondiagnostic.    20  Day 2 of ICU. Status post EGD and colonoscopy by Dr Brenda Hernandez yesterday; EGD was not diagnostic, but colonoscopy showed diverticulosis, and bleeding vessel in the distal transverse colon, and was clipped during the procedure. Patient was agitated overnight; no significant rectal bleeding reported. This morning, he is awake and calm. Stable respirations; no worsening shortness of breath. No chest pains or abdominal pains. CVP- ranging 7-8. Afebrile; blood pressure stable; urine output 3 L last night. Prior history of GERD on Nexium therapy. Patient denies any CAD or CHF or asthma or COPD. He had been a smoker till about 5 years ago. He had used albuterol inhaler in the past.  Sleep-patient has history of FAUSTO on CPAP therapy at home. Prior echocardiogram EF 60-65% 2018. Past Medical History:   Diagnosis Date    Adverse effect of anesthesia     took long time coming out with back surgery done at [facility].  Thinks it was related to Fentanyl    Arthritis     Diabetes ([facility] [address]     type 2    Diverticulitis     Gastrointestinal disorder     diverticulitis    GERD (gastroesophageal reflux disease)     no meds now, was on Nexium previously    Gout     Hypertension     Hypovolemic shock ([facility] [address] 7/25/2020    Lower GI bleed 7/23/2020    Lumbar stenosis with neurogenic claudication 7/26/2017    Other ill-defined conditions(799.89) 2012    Diverticulitis, hospitalized x 4 days    Surgery, elective 8/31/2015    Thromboembolus ([address]     behind left knee was on Xarelto stopped 8-1-15. On no anticoagulation now    Unspecified adverse effect of anesthesia     pt states he \"had trouble coming to, took too long\"    Unspecified sleep apnea     CPAP user. Pt instructed to bring CPAP on DOS      Past Surgical History:   Procedure Laterality Date    COLONOSCOPY N/A 6/13/2016    COLONOSCOPY AND performed by Sharad Hernadez MD at THE [facility] ENDOSCOPY    HX [address] [facility]     ORIF right ankle   [facility] SERVICES SURGERY  2002 & 2005 ,2012    HX CARPAL TUNNEL RELEASE      HX COLECTOMY      part of colon removed due to diverticulitis    HX HERNIA REPAIR      left Ing hernia repair x3    HX ORTHOPAEDIC  2010    left    HX ORTHOPAEDIC  2012    Rem. hardware right ankle    HX RETINAL DETACHMENT REPAIR  1996      Prior to Admission medications    Medication Sig Start Date End Date Taking? Authorizing Provider   vitamin F-Q-D-lutein-minerals (OCUVITE) tablet 1 Tab two (2) times a day. Yes Provider, Historical   polyvinyl alcohol-povidon,PF, (REFRESH CLASSIC) 1.4-0.6 % ophthalmic solution Administer 1-2 Drops to both eyes as needed. Yes Provider, Historical   tamsulosin (Flomax) 0.4 mg capsule Take 0.4 mg by mouth daily. Yes Other, MD Marcia   albuterol (PROVENTIL HFA, VENTOLIN HFA, PROAIR HFA) 90 mcg/actuation inhaler Take 2 Puffs by inhalation daily as needed for Wheezing. Yes Other, MD Marcia   Magnesium Oxide 500 mg cap Take 1 Cap by mouth.    Yes Other, MD Marcia   methocarbamol (ROBAXIN) 500 mg tablet Take 1 Tab by mouth two (2) times daily as needed (Musculoskeletal pain; muscle spasms). 9/13/19  Yes Cyndy Herrera,    amLODIPine-valsartan (EXFORGE)  mg per tablet Take 1 Tab by mouth daily. Yes Provider, Historical   polyethylene glycol (MIRALAX) 17 gram/dose powder Take 17 g by mouth daily as needed. Yes Provider, Historical   carboxymethylcellulose sodium (REFRESH LIQUIGEL) 1 % dlgl Apply  to eye two (2) times a day. Indications: DRY EYE   Yes Provider, Historical   melatonin tab tablet Take 5 mg by mouth nightly. Yes Provider, Historical   multivitamin (ONE A DAY) tablet Take 1 Tab by mouth daily. Yes Provider, Historical   testosterone (FORTESTA) 10 mg/0.5 gram /actuation glpm by TransDERmal route daily. Indications: 1 pump each thigh daily in am 10 mg   Yes Provider, Historical   metoprolol succinate (TOPROL-XL) 100 mg XL tablet Take 100 mg by mouth daily. Take with sip of water dos   Indications: HYPERTENSION   Yes Provider, Historical   telmisartan-hydrochlorothiazide (MICARDIS HCT) 80-12.5 mg per tablet Take 1 Tab by mouth daily. Take with sip of water dos  Indications: HYPERTENSION   Yes Other, MD Marcia   atorvastatin (LIPITOR) 10 mg tablet Take 10 mg by mouth nightly. Yes Other, MD Marcia   sitaGLIPtin (JANUVIA) 100 mg tablet Take 100 mg by mouth daily. Yes Other, MD Marcia   furosemide (LASIX) 20 mg tablet Take 20 mg by mouth as needed. Indications: Edema   Yes Other, MD Marcia   allopurinol (ZYLOPRIM) 100 mg tablet Take 100 mg by mouth two (2) times a day. Yes Other, MD Marcia   glyBURIDE-metFORMIN (GLUCOVANCE) 2.5-500 mg per tablet Take 1 Tab by mouth as needed. For Glucose >150 in the evening   Yes Other, MD Marcia   aspirin delayed-release 81 mg tablet Take  by mouth daily. Provider, Historical   tadalafil (CIALIS) 5 mg tablet Take 5 mg by mouth as needed. Provider, Historical   potassium chloride SR (KLOR-CON 8) 8 mEq tablet Take 8 mEq by mouth daily as needed.  Indications: HYPOKALEMIA PREVENTION Other, MD Marcia     Allergies   Allergen Reactions    Norvasc [Amlodipine] Cough    Tolectin [Tolmetin] Hives      Social History     Tobacco Use    Smoking status: Former Smoker     Packs/day: 0.25     Last attempt to quit: 2014     Years since quittin.5    Smokeless tobacco: Never Used   Substance Use Topics    Alcohol use:  Yes     Alcohol/week: 5.0 standard drinks     Types: 6 Standard drinks or equivalent per week     Frequency: 2-3 times a week     Comment: rare      Family History   Problem Relation Age of Onset    Hypertension Mother     Malignant Hyperthermia Neg Hx         Current Facility-Administered Medications   Medication Dose Route Frequency    potassium chloride 10 mEq in 100 ml IVPB  10 mEq IntraVENous ONCE    0.45% sodium chloride with KCl 20 mEq/L infusion   IntraVENous CONTINUOUS    carboxymethylcellulose sodium (REFRESH PLUS) 0.5 % ophthalmic solution 1 Drop  1 Drop Both Eyes BID    vitamin F-Z-J-lutein-minerals (OCUVITE) tablet 1 Tab  1 Tab Oral BID    pantoprazole (PROTONIX) 40 mg in 0.9% sodium chloride 10 mL injection  40 mg IntraVENous Q12H    lidocaine 4 % patch 1 Patch  1 Patch TransDERmal Q24H    allopurinoL (ZYLOPRIM) tablet 100 mg  100 mg Oral BID    sodium chloride (NS) flush 5-40 mL  5-40 mL IntraVENous Q8H    atorvastatin (LIPITOR) tablet 10 mg  10 mg Oral QHS    [Held by provider] metoprolol succinate (TOPROL-XL) tablet 100 mg  100 mg Oral DAILY    telmisartan (MICARDIS) tablet 80 mg  80 mg Oral DAILY    insulin glargine (LANTUS) injection 10 Units  10 Units SubCUTAneous DAILY    insulin lispro (HUMALOG) injection   SubCUTAneous AC&HS         Review of Systems:  Limited due to patient condition     Objective:   Vital Signs:    Visit Vitals  /51   Pulse 84   Temp 99.4 °F (37.4 °C)   Resp 17   Ht 5' 9\" (1.753 m)   Wt 90.7 kg (200 lb)   SpO2 96%   BMI 29.53 kg/m²       O2 Device: Nasal cannula   O2 Flow Rate (L/min): 2 l/min   Temp (24hrs), Av.3 °F (36.8 °C), Min:97.9 °F (36.6 °C), Max:99.4 °F (37.4 °C)       Intake/Output:   Last shift:      07/26 0701 - 07/26 1900  In: 480 [P.O.:480]  Out: 1200 [Urine:1200]  Last 3 shifts: 07/24 1901 - 07/26 0700  In: 1500.5 [P.O.:100;  I.V.:87.5]  Out: 3875 [Urine:3875]    Intake/Output Summary (Last 24 hours) at 7/26/2020 0920  Last data filed at 7/26/2020 0857  Gross per 24 hour   Intake 1670.5 ml   Output 4875 ml   Net -3204.5 ml         Physical Exam:   Comfortable; on nc O2; acyanotic; appears elderly and good for age  [de-identified]: pupils not dilated, no scleral jaundice, moist oral mucosa, no nasal drainage; pale tongue and conjunctiva  Neck: No adenopathy or thyroid swelling  CVS: S1S2 no murmurs; JVD not elevated  RS: Good air entry bilaterally, no wheezes or crackles; symmetrical BS; normal respirations at rest  Abd: soft, non tender, no hepatosplenomegaly, no abd distension, no guarding or rigidity, bowel sounds heard  Neuro: Awake, alert and oriented now, moving all extremities, nonfocal exam  Extrm: no leg edema or swelling or clubbing  Skin: no rash  Lymphatic: no cervical or supraclavicular adenopathy    Telemetry: Normal sinus rhythm      Data review:     Recent Results (from the past 24 hour(s))   GLUCOSE, POC    Collection Time: 07/25/20  1:37 PM   Result Value Ref Range    Glucose (POC) 155 (H) 70 - 110 mg/dL   PROTHROMBIN TIME + INR    Collection Time: 07/25/20  1:50 PM   Result Value Ref Range    Prothrombin time 18.2 (H) 11.5 - 15.2 sec    INR 1.5 (H) 0.8 - 1.2     PTT    Collection Time: 07/25/20  1:50 PM   Result Value Ref Range    aPTT 28.9 23.0 - 36.4 SEC   FIBRINOGEN    Collection Time: 07/25/20  1:50 PM   Result Value Ref Range    Fibrinogen 165 (L) 210 - 451 mg/dL   HGB & HCT    Collection Time: 07/25/20  1:50 PM   Result Value Ref Range    HGB 7.5 (L) 13.0 - 16.0 g/dL    HCT 22.4 (L) 36.0 - 48.0 %   GLUCOSE, POC    Collection Time: 07/25/20  4:40 PM   Result Value Ref Range    Glucose (POC) 126 (H) 70 - 110 mg/dL   HGB & HCT    Collection Time: 07/25/20  8:05 PM   Result Value Ref Range    HGB 7.7 (L) 13.0 - 16.0 g/dL    HCT 22.5 (L) 36.0 - 48.0 %   TYPE & SCREEN    Collection Time: 07/25/20  8:08 PM   Result Value Ref Range    Crossmatch Expiration 07/28/2020     ABO/Rh(D) Ethan Maxon POSITIVE     Antibody screen NEG    PLASMA, ALLOCATE    Collection Time: 07/25/20  8:30 PM   Result Value Ref Range    Unit number [id number]     Blood component type FP 24h, Thaw     Unit division 00     Status of unit ISSUED    GLUCOSE, POC    Collection Time: 07/25/20  9:27 PM   Result Value Ref Range    Glucose (POC) 89 70 - 110 mg/dL   MAGNESIUM    Collection Time: 07/26/20  5:03 AM   Result Value Ref Range    Magnesium 1.5 (L) 1.6 - 2.6 mg/dL   CBC WITH AUTOMATED DIFF    Collection Time: 07/26/20  5:03 AM   Result Value Ref Range    WBC 10.8 4.6 - 13.2 K/uL    RBC 2.22 (L) 4.70 - 5.50 M/uL    HGB 7.3 (L) 13.0 - 16.0 g/dL    HCT 21.2 (L) 36.0 - 48.0 %    MCV 95.5 74.0 - 97.0 FL    MCH 32.9 24.0 - 34.0 PG    MCHC 34.4 31.0 - 37.0 g/dL    RDW 16.1 (H) 11.6 - 14.5 %    PLATELET 648 (L) 894 - 420 K/uL    MPV 10.8 9.2 - 11.8 FL    NEUTROPHILS 72 40 - 73 %    LYMPHOCYTES 17 (L) 21 - 52 %    MONOCYTES 8 3 - 10 %    EOSINOPHILS 3 0 - 5 %    BASOPHILS 0 0 - 2 %    ABS. NEUTROPHILS 7.9 1.8 - 8.0 K/UL    ABS. LYMPHOCYTES 1.8 0.9 - 3.6 K/UL    ABS. MONOCYTES 0.8 0.05 - 1.2 K/UL    ABS. EOSINOPHILS 0.3 0.0 - 0.4 K/UL    ABS.  BASOPHILS 0.0 0.0 - 0.1 K/UL    DF AUTOMATED     METABOLIC PANEL, COMPREHENSIVE    Collection Time: 07/26/20  5:03 AM   Result Value Ref Range    Sodium 148 (H) 136 - 145 mmol/L    Potassium 2.8 (LL) 3.5 - 5.5 mmol/L    Chloride 117 (H) 100 - 111 mmol/L    CO2 25 21 - 32 mmol/L    Anion gap 6 3.0 - 18 mmol/L    Glucose 73 (L) 74 - 99 mg/dL    BUN 18 7.0 - 18 MG/DL    Creatinine 1.18 0.6 - 1.3 MG/DL    BUN/Creatinine ratio 15 12 - 20      GFR est AA >60 >60 ml/min/1.73m2    GFR est non-AA 59 (L) >60 ml/min/1.73m2    Calcium 7.2 (L) 8.5 - 10.1 MG/DL    Bilirubin, total 0.5 0.2 - 1.0 MG/DL    ALT (SGPT) 14 (L) 16 - 61 U/L    AST (SGOT) 20 10 - 38 U/L    Alk. phosphatase 46 45 - 117 U/L    Protein, total 4.5 (L) 6.4 - 8.2 g/dL    Albumin 2.6 (L) 3.4 - 5.0 g/dL    Globulin 1.9 (L) 2.0 - 4.0 g/dL    A-G Ratio 1.4 0.8 - 1.7     GLUCOSE, POC    Collection Time: 07/26/20  6:54 AM   Result Value Ref Range    Glucose (POC) 100 70 - 110 mg/dL           Recent Labs     07/24/20 2043   FIO2I 0.44   HCO3I 17.4*   PCO2I 27.0*   PHI 7.42   PO2I 89       All Micro Results     None          ECHO January 2018 [place]  OVERALL IMPRESSIONS:  1. The cardiac chambers are of normal size. 2.There is mild-to-moderate concentric left ventricular hypertrophy present with grade 1 diastolic relaxation abnormalities. The E/E' ratio is increased, suggesting elevated left atrial filling pressure. The regional wall motion is normal with an ejection fraction estimated to be 60% to 65%. 3.No significant stenotic or regurgitant valvular lesions are noted. 4.No right ventricular systolic pressure could be estimated in the absence of a full TR jet envelope. The right heart size and configuration are normal.  5.No clots, vegetations, or pericardial effusion are identified. Imaging:  [x]I have personally reviewed the patients chest radiographs images and report       Results from [place] encounter on 07/22/20   CT HEAD WO CONT    Narrative EXAM: CT head    INDICATION: Confusion    COMPARISON: None. TECHNIQUE: Axial CT imaging of the head was performed without intravenous  contrast. One or more dose reduction techniques were used on this CT: automated  exposure control, adjustment of the mAs and/or kVp according to patient size,  and iterative reconstruction techniques. The specific techniques used on this  CT exam have been documented in the patient's electronic medical record.  Digital  Imaging and Communications in Medicine (DICOM) format image data are available  to nonaffiliated external healthcare facilities or entities on a secure, media  free, reciprocally searchable basis with patient authorization for at least a  12-month period after this study. _______________    FINDINGS:    BRAIN AND POSTERIOR FOSSA: The sulci, folia, ventricles and basal cisterns are  within normal limits for the patient's age. There is no intracranial hemorrhage,  mass effect, or midline shift. There are no areas of abnormal parenchymal  attenuation. EXTRA-AXIAL SPACES AND MENINGES: There are no abnormal extra-axial fluid  collections. CALVARIUM: Intact. SINUSES: Clear. OTHER: None.    _______________      Impression IMPRESSION:    No acute intracranial abnormalities. CT abdomen/pelvis with contrast 7/24/2020  COMPARISON: GI bleeding scan dated 7/23/2020  FINDINGS:  LOWER CHEST: Basilar atelectasis and/or scarring noted. Coronary artery  calcifications identified. LIVER, BILIARY: Liver is normal. No biliary dilation. Gallbladder is  unremarkable. PANCREAS: Normal.  SPLEEN: Normal.  ADRENALS: Normal.  KIDNEYS/URETERS/BLADDER: No suspicious renal masses or hydronephrosis. PELVIC ORGANS: Unremarkable. LYMPH NODES: No enlarged lymph nodes. GASTROINTESTINAL TRACT: Prior sigmoid colon resection with anastomosis in the  pelvis. Scattered diverticula identified. There are some scattered areas of  subtle hyperdensity within the stomach, duodenum, and portions of the distal  colon although are seen on both noncontrast and postcontrast imaging. No  convincing evidence of active GI bleeding identified. VASCULATURE: Atherosclerosis.   BONES: Degenerative changes throughout the spine with prior thoracolumbar  fusion. Left L2 transpedicular screw is positioned lateral to the vertebral  body. OTHER: None. IMPRESSION:  1. Prior sigmoid colon resection with anastomosis in the pelvis. Colonic  diverticulosis again identified.  No convincing evidence of active GI bleeding  identified. Scattered areas of hyperdensity within bowel lumen in the stomach as  well as portions of the colon seen on both precontrast and postcontrast imaging  are nonspecific.       Nuclear medicine RBC scan 7/23/2020  IMPRESSION:    No evidence of active GI bleeding.       Chest x-ray 1 view 7/25/2020  FINDINGS:  SUPPORT DEVICES: Right jugular central venous catheter tip projects at the  distal SVC. Nasogastric tube is present with its tip projecting just into the  gastric fundus, and side-port probably just proximal to the EG junction. HEART AND MEDIASTINUM: Aorta is tortuous as previously seen. Cardiac silhouette  is within normal range in size. LUNGS AND PLEURAL SPACES: Pulmonary vessels are normal. Lungs are clear. Costophrenic angles are sharp. No pneumothorax. BONY THORAX AND SOFT TISSUES: No acute osseous abnormality. IMPRESSION:  Nasogastric tube tip in the stomach, with its side port just above the EG  junction, recommend advancing at least 10 cm. No active cardiopulmonary disease.         IMPRESSION:   · Acute GI bleeding-likely lower GI from diverticulosis   · Severe anemia from GI bleeding  · Acute renal failure   · Type 2 diabetes mellitus  · FAUSTO on CPAP therapy  ·   Patient Active Problem List   Diagnosis Code    GI bleed K92.2    DM (diabetes mellitus) ([facility] [address] E11.9    Essential hypertension, malignant I10    Diverticulosis of colon without diverticulitis K57.30    HTN (hypertension), benign I10    Lumbar stenosis M48.061    Diverticulosis K57.90    Sleep apnea G47.30    Acute renal failure (ARF) (HCC) N17.9    Severe anemia D64.9     ·       RECOMMENDATIONS:   · Pulm: Respirations remained stable; wean nasal cannula oxygen; keep O2 sats greater than 91%  · Chest x-ray showed clear lungs  · Cardiac: Watch hemodynamics and blood pressure; continue intravenous fluids  · GI: Status post colonoscopy with clipping of bleeding blood vessel in distal transverse colon; please see report by Dr. Valeria Francisco; Dr. Pickard [facility] surgery has also been consulted-currently no surgical intervention needed. · Hem: Status post PRBCs and FFP's; status post 1 dose vitamin K; watch H&H every 8 hours; keep hemoglobin greater than 7 g/dL; hemoglobin trending down 7.3 this morning- transfuse 1 unit due to risk of acute coronary syndrome in this elderly patient; plan for 1 dose Lasix 20 mg with this transfusion. Mild decrease in platelets due to dilutional effect from transfusion with PRBCs. · ID: No active issues  · Renal: Watch I/O's; Ennis catheter for accurate monitoring; watch renal function and avoid nephrotoxic medications; potassium being replaced, and plan for recheck in 2 hours  · Neuro: Stable mentation now; agitation overnight; patient denies any significant alcohol use  · Endo: Blood glucose stable; Lantus baseline 10 units due to diabetes and elevated blood sugars; sliding scale insulin; follow blood sugars and watch for hypoglycemia; patient currently on full liquid diet  · Vasc: Right IJ central line; CVP ranges 8; ultrasound venous lower extremities pending due to history of DVT left leg in the past  · Fluids: Mild hypernatremia; fluids changed to half-normal saline with 20 mEq KCl at 75 mL/h  · Nutrition: Oral full liquid diet; advance as tolerated  · Proph:  DVt- check ultrasound legs before SCDs; GI proph- PPI  · Discussed with patient and updated management plans. I will also try and reach out to patient's son. Will defer respective systems problem management to primary and other consultant and follow patient in ICU with primary and other medical team  Further recommendations will be based on the patient's response to recommended treatment and results of the investigation ordered. Quality Care: PPI, DVT prophylaxis, HOB elevated, Infection control all reviewed and addressed.   PAIN AND SEDATION: none   · Skin/Wound: no active issues  · Nutrition:  Full liquid diet    · Prophylaxis: DVT and GI Prophylaxis reviewed  · Restraints: none  · PT/OT eval and treat: as needed  · Lines/Tubes: PIVs; right IJ line 7/25;  Ennis catheter 7/25-medically necessary  ADVANCE DIRECTIVE: Full Code     High complexity decision making was performed in this consultation and evaluation of this patient who is at high risk for decompensation with multiple organ involvement  Total critical care time spent rendering care exclusive of procedures: 37  minutes     Nahomy Mckeon MD

## 2020-07-26 NOTE — PROGRESS NOTES
Hospitalist Progress Note    Patient: Galo Jaime MRN: [id number]  CSN: [id number]    YOB: 1940  Age: [de-identified] y.o. Sex: male    DOA: 7/22/2020 LOS:  LOS: 3 days          Chief Complaint:    GI bleed      Assessment/Plan     [de-identified] yo AAM with gi bleed, admitted 7/22day afternoon  Colonoscopy and EGD cancelled due to his delerium     Severe GI bleed  Acute blood loss anemia  Diverticulosis with partial colectomy in past  Hypovolemic shock resolved  Toxic metabolic encephalopathy due to severe ac blood loss-improved  Hypokalemia  hypomagnesemia  NIDDM  CHEY resolved  Hypoalbuminemia    S/p colonoscopy and EGD   S/p blood and plasma transfusions-4 units blood, 2 of plasma    1 more unit blood for Hgb 7.3 this am  repeltion of K and Mag  Gentle IVF    D/c fontaine    Diet as per GI  Follow BG levels, SSI, lantus      Disposition :  Patient Active Problem List   Diagnosis Code    GI bleed K92.2    [facility]di[facility]) [facility] E11.9    Essential hypertension, malignant I10    Diverticulosis of colon without diverticulitis K57.30    HTN (hypertension), benign I10    Lumbar stenosis M48.061    Diverticulosis K57.90    Sleep apnea G47.30    Acute renal failure (ARF) (HCC) N17.9    Severe anemia D64.9       Subjective:  I feel bad, I should apologize to people at night    I am ok, I feel a little better now      Review of systems:    Constitutional: denies fevers, chills  Respiratory: denies SOB, cough  Cardiovascular: denies chest pain, palpitations  Gastrointestinal: denies nausea, vomiting, diarrhea      Vital signs/Intake and Output:  Visit Vitals  /51   Pulse 84   Temp 99.4 °F (37.4 °C)   Resp 17   Ht 5' 9\" (1.753 m)   Wt 90.7 kg (200 lb)   SpO2 96%   BMI 29.53 kg/m²     Current Shift:  07/26 0701 - 07/26 1900  In: -   Out: 1200 [Urine:1200]  Last three shifts:  07/24 1901 - 07/26 0700  In: 1500.5 [P.O.:100;  I.V.:87.5]  Out: 3875 [Urine:3875]    Exam:    General: elderly AAM, alert, NAD, OX3, still pale  Head/Neck: NCAT, supple, No masses, No lymphadenopathy  CVS:Regular rate and rhythm, no M/R/G, S1/S2 heard, no thrill  Lungs:Clear to auscultation bilaterally, no wheezes, rhonchi, or rales  Abdomen: Soft, Nontender, No distention, Normal Bowel sounds, No hepatomegaly  Extremities: No C/C/E, pulses palpable 2+  Neuro:grossly normal , follows commands  Psych:appropriate                Labs: Results:       Chemistry Recent Labs     07/26/20  0503 07/25/20  0425 07/24/20  1840   GLU 73* 233* 265*   * 145 144   K 2.8* 3.9 3.8   * 114* 110   CO2 25 20* 21   BUN 18 30* 25*   CREA 1.18 1.62* 1.40*   CA 7.2* 7.4* 7.7*   AGAP 6 11 13   BUCR 15 19 18   AP 46 43* 54   TP 4.5* 4.0* 5.0*   ALB 2.6* 2.2* 2.6*   GLOB 1.9* 1.8* 2.4   AGRAT 1.4 1.2 1.1      CBC w/Diff Recent Labs     07/26/20  0503 07/25/20 2005 07/25/20  1350 07/25/20  0519  07/24/20  0300   WBC 10.8  --   --  10.3  --  5.3   RBC 2.22*  --   --  2.47*  --  3.03*   HGB 7.3* 7.7* 7.5* 8.0*   < > 10.4*   HCT 21.2* 22.5* 22.4* 23.8*   < > 30.6*   *  --   --  143  --  167   GRANS 72  --   --  69  --  44   LYMPH 17*  --   --  23  --  39   EOS 3  --   --  0  --  7*    < > = values in this interval not displayed. Cardiac Enzymes No results for input(s): CPK, CKND1, CHETNA in the last 72 hours. No lab exists for component: CKRMB, TROIP   Coagulation Recent Labs     07/25/20  1350   PTP 18.2*   INR 1.5*   APTT 28.9       Lipid Panel No results found for: CHOL, CHOLPOCT, CHOLX, CHLST, CHOLV, 453724, HDL, HDLP, LDL, LDLC, DLDLP, 903411, VLDLC, VLDL, TGLX, TRIGL, TRIGP, TGLPOCT, CHHD, CHHDX   BNP No results for input(s): BNPP in the last 72 hours.    Liver Enzymes Recent Labs     07/26/20  0503   TP 4.5*   ALB 2.6*   AP 46      Thyroid Studies No results found for: T4, T3U, TSH, TSHEXT     Procedures/imaging: see electronic medical records for all procedures/Xrays and details which were not copied into this note but were reviewed prior to creation of Angeline Langley MD

## 2020-07-26 NOTE — PROGRESS NOTES
Pulm/CC    Tata Roles  Son  [phone]        I have called and updated son; discussed patient condition which appears to be stable now; no obvious evidence of lower GI bleeding; reviewed colonoscopy and endoscopy procedure results; discussed about transfusing 1 unit of blood today to keep hemoglobin closer to 8 considering elderly age and risk for acute coronary syndrome; if patient remains stable, likely will be transferred to medical floor tomorrow. Answered all questions to satisfaction.     Brenda Boyd MD

## 2020-07-26 NOTE — PROGRESS NOTES
0700 Bedside shift change report received from Mirian Lynch RN. Report included the following information SBAR, Kardex, Procedure Summary, Intake/Output, MAR, Recent Results and Med Rec Status.

## 2020-07-27 ENCOUNTER — APPOINTMENT (OUTPATIENT)
Dept: NUCLEAR MEDICINE | Age: 80
DRG: 377 | End: 2020-07-27
Attending: HOSPITALIST
Payer: MEDICARE

## 2020-07-27 VITALS
TEMPERATURE: 99.1 F | SYSTOLIC BLOOD PRESSURE: 152 MMHG | HEART RATE: 97 BPM | OXYGEN SATURATION: 100 % | BODY MASS INDEX: 29.62 KG/M2 | DIASTOLIC BLOOD PRESSURE: 74 MMHG | WEIGHT: 200 LBS | HEIGHT: 69 IN | RESPIRATION RATE: 9 BRPM

## 2020-07-27 PROBLEM — G93.41 METABOLIC ENCEPHALOPATHY: Status: ACTIVE | Noted: 2020-07-27

## 2020-07-27 LAB
ALBUMIN SERPL-MCNC: 2.3 G/DL (ref 3.4–5)
ALBUMIN/GLOB SERPL: 1.2 {RATIO} (ref 0.8–1.7)
ALP SERPL-CCNC: 49 U/L (ref 45–117)
ALT SERPL-CCNC: 16 U/L (ref 16–61)
ANION GAP SERPL CALC-SCNC: 7 MMOL/L (ref 3–18)
AST SERPL-CCNC: 21 U/L (ref 10–38)
BASOPHILS # BLD: 0 K/UL (ref 0–0.1)
BASOPHILS NFR BLD: 0 % (ref 0–2)
BILIRUB SERPL-MCNC: 0.8 MG/DL (ref 0.2–1)
BUN SERPL-MCNC: 11 MG/DL (ref 7–18)
BUN/CREAT SERPL: 9 (ref 12–20)
CALCIUM SERPL-MCNC: 7.2 MG/DL (ref 8.5–10.1)
CHLORIDE SERPL-SCNC: 117 MMOL/L (ref 100–111)
CO2 SERPL-SCNC: 23 MMOL/L (ref 21–32)
CREAT SERPL-MCNC: 1.17 MG/DL (ref 0.6–1.3)
DIFFERENTIAL METHOD BLD: ABNORMAL
EOSINOPHIL # BLD: 0.4 K/UL (ref 0–0.4)
EOSINOPHIL NFR BLD: 4 % (ref 0–5)
ERYTHROCYTE [DISTWIDTH] IN BLOOD BY AUTOMATED COUNT: 16.4 % (ref 11.6–14.5)
GLOBULIN SER CALC-MCNC: 1.9 G/DL (ref 2–4)
GLUCOSE BLD STRIP.AUTO-MCNC: 191 MG/DL (ref 70–110)
GLUCOSE BLD STRIP.AUTO-MCNC: 209 MG/DL (ref 70–110)
GLUCOSE SERPL-MCNC: 187 MG/DL (ref 74–99)
HCT VFR BLD AUTO: 21.5 % (ref 36–48)
HCT VFR BLD AUTO: 24 % (ref 36–48)
HGB BLD-MCNC: 7.3 G/DL (ref 13–16)
HGB BLD-MCNC: 8.1 G/DL (ref 13–16)
INR PPP: 1.4 (ref 0.8–1.2)
LACTATE SERPL-SCNC: 1.3 MMOL/L (ref 0.4–2)
LYMPHOCYTES # BLD: 1.8 K/UL (ref 0.9–3.6)
LYMPHOCYTES NFR BLD: 19 % (ref 21–52)
MAGNESIUM SERPL-MCNC: 1.7 MG/DL (ref 1.6–2.6)
MCH RBC QN AUTO: 31.6 PG (ref 24–34)
MCHC RBC AUTO-ENTMCNC: 34 G/DL (ref 31–37)
MCV RBC AUTO: 93.1 FL (ref 74–97)
MONOCYTES # BLD: 0.7 K/UL (ref 0.05–1.2)
MONOCYTES NFR BLD: 7 % (ref 3–10)
NEUTS SEG # BLD: 6.7 K/UL (ref 1.8–8)
NEUTS SEG NFR BLD: 70 % (ref 40–73)
PLATELET # BLD AUTO: 105 K/UL (ref 135–420)
PMV BLD AUTO: 10.3 FL (ref 9.2–11.8)
POTASSIUM SERPL-SCNC: 3.5 MMOL/L (ref 3.5–5.5)
PROT SERPL-MCNC: 4.2 G/DL (ref 6.4–8.2)
PROTHROMBIN TIME: 17.2 SEC (ref 11.5–15.2)
RBC # BLD AUTO: 2.31 M/UL (ref 4.7–5.5)
SODIUM SERPL-SCNC: 147 MMOL/L (ref 136–145)
WBC # BLD AUTO: 9.6 K/UL (ref 4.6–13.2)

## 2020-07-27 PROCEDURE — 85025 COMPLETE CBC W/AUTO DIFF WBC: CPT

## 2020-07-27 PROCEDURE — 74011000250 HC RX REV CODE- 250: Performed by: INTERNAL MEDICINE

## 2020-07-27 PROCEDURE — 82962 GLUCOSE BLOOD TEST: CPT

## 2020-07-27 PROCEDURE — 74011250636 HC RX REV CODE- 250/636: Performed by: INTERNAL MEDICINE

## 2020-07-27 PROCEDURE — 74011250637 HC RX REV CODE- 250/637: Performed by: INTERNAL MEDICINE

## 2020-07-27 PROCEDURE — 87635 SARS-COV-2 COVID-19 AMP PRB: CPT

## 2020-07-27 PROCEDURE — 74011636637 HC RX REV CODE- 636/637: Performed by: HOSPITALIST

## 2020-07-27 PROCEDURE — 36430 TRANSFUSION BLD/BLD COMPNT: CPT

## 2020-07-27 PROCEDURE — 74011250637 HC RX REV CODE- 250/637: Performed by: HOSPITALIST

## 2020-07-27 PROCEDURE — P9016 RBC LEUKOCYTES REDUCED: HCPCS

## 2020-07-27 PROCEDURE — C9113 INJ PANTOPRAZOLE SODIUM, VIA: HCPCS | Performed by: INTERNAL MEDICINE

## 2020-07-27 PROCEDURE — 85018 HEMOGLOBIN: CPT

## 2020-07-27 PROCEDURE — 80053 COMPREHEN METABOLIC PANEL: CPT

## 2020-07-27 PROCEDURE — P9059 PLASMA, FRZ BETWEEN 8-24HOUR: HCPCS

## 2020-07-27 PROCEDURE — 83605 ASSAY OF LACTIC ACID: CPT

## 2020-07-27 PROCEDURE — 85610 PROTHROMBIN TIME: CPT

## 2020-07-27 PROCEDURE — 83735 ASSAY OF MAGNESIUM: CPT

## 2020-07-27 RX ORDER — MAGNESIUM SULFATE HEPTAHYDRATE 40 MG/ML
2 INJECTION, SOLUTION INTRAVENOUS ONCE
Status: COMPLETED | OUTPATIENT
Start: 2020-07-27 | End: 2020-07-27

## 2020-07-27 RX ORDER — SODIUM CHLORIDE 9 MG/ML
250 INJECTION, SOLUTION INTRAVENOUS AS NEEDED
Status: DISCONTINUED | OUTPATIENT
Start: 2020-07-27 | End: 2020-07-27 | Stop reason: HOSPADM

## 2020-07-27 RX ADMIN — Medication 10 ML: at 06:34

## 2020-07-27 RX ADMIN — CARBOXYMETHYLCELLULOSE SODIUM 1 DROP: 5 SOLUTION/ DROPS OPHTHALMIC at 09:00

## 2020-07-27 RX ADMIN — SODIUM CHLORIDE 40 MG: 9 INJECTION, SOLUTION INTRAMUSCULAR; INTRAVENOUS; SUBCUTANEOUS at 09:40

## 2020-07-27 RX ADMIN — INSULIN LISPRO 4 UNITS: 100 INJECTION, SOLUTION INTRAVENOUS; SUBCUTANEOUS at 09:40

## 2020-07-27 RX ADMIN — SODIUM CHLORIDE AND POTASSIUM CHLORIDE: 4.5; 1.49 INJECTION, SOLUTION INTRAVENOUS at 12:21

## 2020-07-27 RX ADMIN — Medication 1 TABLET: at 09:39

## 2020-07-27 RX ADMIN — TELMISARTAN 80 MG: 80 TABLET ORAL at 09:00

## 2020-07-27 RX ADMIN — MAGNESIUM SULFATE HEPTAHYDRATE 2 G: 40 INJECTION, SOLUTION INTRAVENOUS at 09:55

## 2020-07-27 RX ADMIN — METOPROLOL SUCCINATE 100 MG: 100 TABLET, EXTENDED RELEASE ORAL at 09:39

## 2020-07-27 RX ADMIN — ALLOPURINOL 100 MG: 100 TABLET ORAL at 09:39

## 2020-07-27 RX ADMIN — INSULIN GLARGINE 10 UNITS: 100 INJECTION, SOLUTION SUBCUTANEOUS at 09:42

## 2020-07-27 NOTE — PROGRESS NOTES
Bedside and Verbal shift change report given to Angel Tellez and MARIZA lema Rn (oncoming nurse) by Chiquita Keller (offgoing nurse). Report included the following information SBAR, Kardex, ED Summary, Procedure Summary, Intake/Output, Med Rec Status and Cardiac Rhythm NSR.     0745 hrs Shift assessment completed. 0754 hrs 1 PRBC Started. Tolerated well. 1000 hrs Notified Medical transport about [facility] bed availability. Transfer in process . 1100 hrs SBAR report give it to Jose Carlos Guillermo at [facility] all questions answered. Called London Rogers \" son \" to notified time of transfer. 1205 hrs Pt discharged to [facility] with belongings. Alert, oriented x 3. @ % sats, NSR on the monitor. IVF via pump. [place] cath. NAD. Documents signed and sent it with [facility]. Nurses supervisor @ bedside.

## 2020-07-27 NOTE — DISCHARGE SUMMARY
[address]    Name:  Jamel Diaz  MR#:   [id number]  :  1940  ACCOUNT #:  [de-identified]  ADMIT DATE:  2020  DISCHARGE DATE:  2020      DISPOSITION:  The patient is being transferred to [facility] for recurrent severe lower GI bleeding. DISCHARGE DIAGNOSES:  1. Diverticular bleeding. 2.  Severe acute blood loss. 3.  Status post colonoscopy with clipping of the vessel. 4.  Status post EGD with no evidence for upper gastrointestinal bleeding. 5.  Toxic metabolic encephalopathy due to severe blood loss. 6.  Non insulin-dependent diabetes mellitus. 7.  Acute kidney injury, resolved. 8.  Hypoalbuminemia. CONSULTANTS:  1.  Dr. Annika Edwards MD, Gastroenterology. 2.  Dr. Rajesh Escobedo intensivist.  3.  Dr. Minoo Pruitt, Surgery. HOSPITAL SUMMARY:  The patient is a very pleasant [de-identified] [facility] American male with a history of diverticular bleeding, for which he had surgery about 5 years ago. He has had diverticulitis for sometime. So the patient came in during this hospitalization for rectal bleeding, and he was taking a daily aspirin and drinks alcohol whiskey about three times a week, but hemoglobin was stable. Initially, he was hemodynamically stable. No acute issues. The first 24 hours were unremarkable, and then the next day, he began to have more aggressive GI bleeding. GI was already on the case. They recommended trying to do a bowel prep. He ended up having various radiological studies including a CT angiogram on  that showed no evidence for active GI bleeding. He had a CT head because he had become confused that it was negative. He ended up climbing out of bed, walking down the hallway, bleeding down the hallway. Has so far during this hospital stay, would have had 6 units of blood and 3 units of plasma. The patient was moved to the ICU for closer monitoring. He had a colonoscopy the night of the . He had a vessel clipped. EGD was done. There was no evidence for upper GI bleeding and things seemed to be better. His diet was advanced. There was no further bleeding. He was hemodynamically stable but unfortunately, last night, he started bleeding again. He had five large bloody bowel movements. With that, Surgery here has said they have nothing to offer the patient. He continues to bleed, and I am concerned about his instability in that regard. I would prefer he be at a tertiary care center for more higher level intervention if necessary, especially considering his advanced age and risk for potential surgery. GI recommended a nuclear bleeding scan this morning. I have ordered that, but he has been accepted at [facility] at this point in time. He is running 2 units of blood currently and 1 unit of plasma. He is hemodynamically stable at this point. Still he is awake and alert, oriented to person, in no acute distress. His blood pressure is 152/74, pulse 97, temp 98, respiratory rate is 12, SaO2 is 100% on 2 L. I spoke to his son by phone and advised him of the [place] [facility], so he is aware of that and agrees. The patient's most recent H and H are 7.3 and 21.5. His lactate is normal at 1.3. His chemistry shows a BUN of 11, creatinine 1.17. Mag is 1.7. LFTs are unremarkable. Albumin is 2.3. Sodium is 147, potassium 3.5. His abdomen is soft with diminished bowel sounds. Lungs are clear. Cardiac exam, regular rate and rhythm. Lower extremities are without edema, but he does appear pale, and again, he is confused this morning.   Plan is to [place] [facility] [place] critical care assessment at tertiary care facility for recurrent complicated GI bleeding due to diverticulosis with prior history of diverticular colon surgery, and of note, it looks like his surgery was for severe left colon diverticular disease where he had a left hemicolectomy with mobilization of the splenic flexure, low colorectal anastomosis and removal of old mesh from an inguinal hernia, that was 08/31/2015 by Dr. Desiree Cloud. He is a diabetic. His blood sugars are between 172 and 209, and he is not been on any anticoagulants here. His INR is 1.4. He is receiving a unit of plasma this morning. Transfer risks include worsening bleeding, instability hemodynamically, cardiac arrest, stroke, death, however, benefits to procure tertiary care assessment and surgical evaluation at this point are outweighed by the benefits. Son is in agree with that plan as are the specialist here in the hospital, including the intensivist and asked that I transfer him this morning. Thank you to Dr. Andriy Ziegler at [address] for accepting the patient in transfer.       Eduardo Nuno MD      [id number]  D:  07/27/2020 9:48  T:  07/27/2020 10:15  JOB #:  [id number]

## 2020-07-27 NOTE — PROGRESS NOTES
Problem: Falls - Risk of  Goal: *Absence of Falls  Description: Document Liban Salazar Fall Risk and appropriate interventions in the flowsheet.   Outcome: Progressing Towards Goal  Note: Fall Risk Interventions:  Mobility Interventions: Bed/chair exit alarm, Communicate number of staff needed for ambulation/transfer, Patient to call before getting OOB, OT consult for ADLs, Utilize walker, cane, or other assistive device    Mentation Interventions: Bed/chair exit alarm, Door open when patient unattended, Evaluate medications/consider consulting pharmacy, Reorient patient, Room close to nurse's station, Eyeglasses and hearing aids    Medication Interventions: Bed/chair exit alarm, Evaluate medications/consider consulting pharmacy, Patient to call before getting OOB    Elimination Interventions: Bed/chair exit alarm, Call light in reach, Patient to call for help with toileting needs, Toileting schedule/hourly rounds              Problem: Patient Education: Go to Patient Education Activity  Goal: Patient/Family Education  Outcome: Progressing Towards Goal     Problem: Patient Education: Go to Patient Education Activity  Goal: Patient/Family Education  Outcome: Progressing Towards Goal     Problem: High Risk or History of FAUSTO  Goal: Recognition of FAUSTO or High Risk for FAUSTO  Outcome: Progressing Towards Goal  Goal: Maintain Patent Airway and Adequate Oxygenation  Outcome: Progressing Towards Goal  Goal: Avoid Over-sedation  Outcome: Progressing Towards Goal  Goal: Maintenance Care of FAUSTO  Outcome: Progressing Towards Goal     Problem: Patient Education: Go to Patient Education Activity  Goal: Patient/Family Education  Outcome: Progressing Towards Goal     Problem: Diabetes Maintenance:Admission  Goal: Activity/Safety  Outcome: Progressing Towards Goal  Goal: Diagnostic Tests/Procedures  Outcome: Progressing Towards Goal  Goal: Nutrition  Outcome: Progressing Towards Goal  Goal: Medications  Outcome: Progressing Towards Goal  Goal: Treatments/Interventions/Procedures  Outcome: Progressing Towards Goal     Problem: Diabetes Maintenance:Ongoing  Goal: Activity/Safety  Outcome: Progressing Towards Goal  Goal: Nutrition  Outcome: Progressing Towards Goal  Goal: Medications  Outcome: Progressing Towards Goal  Goal: Treatments/Interventsions/Procedures  Outcome: Progressing Towards Goal  Goal: *Blood Glucose 80 to 180 md/dl  Outcome: Progressing Towards Goal     Problem: Diabetes Maintenance:Discharge Outcomes  Goal: *Describes follow-up/return visits to physicians  Outcome: Progressing Towards Goal  Goal: *Blood glucose at patient's target range or approaching  Outcome: Progressing Towards Goal  Goal: *Aware of nutrition guidelines  Outcome: Progressing Towards Goal  Goal: *Verbalizes information about medication  Description: Verbalizes name, dosage, time, side effects, and number of days to  continue medications. Outcome: Progressing Towards Goal  Goal: *Describes goals, rules, symptoms, and treatments  Description: Describes blood glucose goals, monitoring, sick day rules,  hypo/hyperglycemia prevention, symptoms, and treatment  Outcome: Progressing Towards Goal  Goal: *Describes available outpatient diabetes resources and support systems  Outcome: Progressing Towards Goal     Problem: Pressure Injury - Risk of  Goal: *Prevention of pressure injury  Description: Document Patrick Scale and appropriate interventions in the flowsheet.   Outcome: Progressing Towards Goal     Problem: Patient Education: Go to Patient Education Activity  Goal: Patient/Family Education  Outcome: Progressing Towards Goal     Problem: Non-Violent Restraints  Goal: Non-violent Restaints:Standard Interventions  Outcome: Progressing Towards Goal  Goal: Non-violent Restraints:Patient Interventions  Outcome: Progressing Towards Goal  Goal: Patient/Family Education  Outcome: Progressing Towards Goal

## 2020-07-27 NOTE — DIABETES MGMT
GLYCEMIC CONTROL PROGRESS NOTE:    - known h/o T2DM, HbA1 within recommended range for age + comorbids on oral home regimen  - BG out of target range ICU: 140-180 mg/dL  - TDD = 14 (10 Lantus + 4 units - Humalog Normal Insulin Sensitivity Corrective Coverage)      Recent Glucose Results:   Lab Results   Component Value Date/Time     (H) 07/27/2020 04:45 AM     (H) 07/26/2020 02:00 PM    GLUCPOC 209 (H) 07/27/2020 07:22 AM    GLUCPOC 172 (H) 07/26/2020 09:27 PM    GLUCPOC 112 (H) 07/26/2020 04:04 PM     Michael Bower MS, RN, CDE  Glycemic Control Team  [phone]  Pager [phone] (M-TH 8:00-4:30P)  *After Hours pager [phone]

## 2020-07-27 NOTE — PROGRESS NOTES
Bedside and Verbal shift change report given to MEG Cantor RN and Sharon HICKMAN (oncoming nurse) by Felicitas Rodriguez. Bailey Fatima RN (offgoing nurse). Report included the following information SBAR, Kardex, ED Summary, Procedure Summary, Intake/Output and Cardiac Rhythm NSR.  
 
0900 hr Medicated patient at this time, RN at bedside.

## 2020-07-27 NOTE — PROGRESS NOTES
1910: Bedside and Verbal shift change report given to Mann Marquez RN (oncoming nurse) by Mario Conte RN (offgoing nurse). Report included the following information SBAR, Kardex, Procedure Summary, Intake/Output, MAR, Recent Results, Med Rec Status, Cardiac Rhythm Sinus Rhythm and Alarm Parameters . 2000: Shift assessment completed. 2258: Paged Dr. Kristine Dejesus concerning rectal bleeding. H&H collected, awaiting results. Orders received for FFP at this time. Will continue to monitor. Pt post-procedure colonoscopy - 7/26/2020    2318: Requested Dr. Kristine Dejesus at bedside due to two episodes of bright red bleeding from rectum. 2320: Dr. Kristine Dejesus at bedside, PRBCs ordered and awaiting FFP to be ready for pick-up from lab.     0000: Reassessment completed. 0340: Unit transfer order cancelled due to increased rectal bleeding per Dr. Kristine Dejesus. MD will contact Dr. Kali Song if warranted. 0400: Reassessment completed. 0730: Bedside and Verbal shift change report given to Russ Masters RN (oncoming nurse) by Mann Marquez RN (offgoing nurse). Report included the following information SBAR, Kardex, Intake/Output, MAR, Recent Results, Med Rec Status, Cardiac Rhythm Sinus Rhythm and Alarm Parameters .

## 2020-07-27 NOTE — PROGRESS NOTES
[facility]  Pulmonary, Critical Care, and Sleep Medicine    Pulm/CC Note    Name: Radha Cannon MRN: [id number]   : 1940 Hospital: [facility] [place]   Date: 2020  Room: 102/01         IMPRESSION:   · Acute lower GI bleeding-likely lower GI from diverticulosis  K92.2  · Severe anemia from GI bleeding D64.9  · Acute renal failure N17.9  · Type 2 diabetes mellitus  · FAUSTO on CPAP therapy G47.33  · Thrombytopenia D69.6  Patient Active Problem List   Diagnosis Code    GI bleed K92.2    DM (diabetes mellitus) ([facility] [address] E11.9    Essential hypertension, malignant I10    Diverticulosis of colon without diverticulitis K57.30    HTN (hypertension), benign I10    Lumbar stenosis M48.061    Diverticulosis K57.90    Sleep apnea G47.30    Acute renal failure (ARF) (HCC) N17.9    Severe anemia D64.9      RECOMMENDATIONS:   · Pulm: Compensated respirations; on room air; keep O2 sats greater than 91%  · Chest x-ray 20 showed clear lungs; no clinical evidence for volume overload  · Aspiration prevention measures, HOB 30'  · Cardiac: monitor hemodynamics and blood pressure; continue 1/2 NS with KCl intravenous fluids  · Prior received Lasix 20 mg x 1  · GI: s/p colonoscopy with clipping of bleeding blood vessel in distal transverse colon; please see report by Dr. Suyapa Paez; Dr. Rose Gunter surgery has also been consulted  · Given recurrent bleeding and prior negative bleeding scan hospitalist contacted tertiary center at [facility] and patient been accepted at [facility] for further management including but limited to any surgery plans. · GI and surgery aware  · Hem: Status post PRBCs and FFP's; status post 1 dose vitamin K; watch H&H every 4 hours; keep hemoglobin greater than 8 g/dL due to risk of acute coronary syndrome in this elderly patient  · Monitor platelets due to dilutional effect from transfusion with PRBCs. · ID: No active issues  · Renal: monitor I/O's and S.  Na  · Replace lytes and recheck  · Neuro: Stable mentation at present; patient has denied any significant alcohol use. Prior CT head nil acute; ammonia normal  · Endo: Blood glucose stable; Lantus baseline 10 units due to diabetes and sliding scale insulin; follow blood sugars and avoid hypoglycemia; patient currently NPO  · Vasc: Right IJ central line; ultrasound venous lower extremities 7/26 - no DVT; history of DVT left leg in the past  · Nutrition: NPO  · Proph:  DVt- SCDs; GI proph- PPI  · Discussed with patient, RN, Dr. Ventura Vila and updated management plans. Will defer respective systems problem management to primary and other consultant and follow patient in ICU with primary and other medical team  Further recommendations will be based on the patient's response to recommended treatment and results of the investigation ordered. Quality Care: PPI, DVT prophylaxis, HOB elevated, Infection control all reviewed and addressed. PAIN AND SEDATION: none   · Skin/Wound: no active issues  · Nutrition:  NPO    · Prophylaxis: DVT and GI Prophylaxis reviewed  · PT/OT eval and treat: as needed  · Lines/Tubes: PIVs; right IJ line 7/25; Ennis catheter 7/25-medically necessary  ADVANCE DIRECTIVE: Full Code     High complexity decision making was performed in this consultation and evaluation of this patient who is at high risk for decompensation with multiple organ involvement  Total critical care time spent rendering care exclusive of procedures: 35 minutes    Subjective:   Patient is a [de-identified] y.o. male with history of diverticulosis, type 2 diabetes mellitus, hypertension, obstructive sleep apnea on CPAP therapy and history of DVT in the past, currently not on anticoagulation therapy. The patient has history of significant lower GI bleeding in 2016, and underwent sigmoid colectomy by Dr. Linus Caicedo for diverticular bleeding. The patient was admitted on 7/23 early morning with bloody bowel movements. He appears to have recurrent bloody BMs.   He has had CT abdomen and nuclear medicine scan so far-nondiagnostic.    07/27/20  Overnight patient had episode of LGIB  Received PRBC and FFP  Some episode of delirious  Hemodynamically stable. Afebrile  Patient denies any chest pain, dyspnea, palpitations, syncope, orthopnea, edema or paroxysmal nocturnal dyspnea. Denies abdominal or flank pain, anorexia, nausea or vomiting, dysphagia    S/p EGD and colonoscopy by Dr. Estephania Henry 7/25/20; EGD was not diagnostic, but colonoscopy showed diverticulosis, and bleeding vessel in the distal transverse colon, and was clipped during the procedure. Prior history of GERD on Nexium therapy. Patient denies any CAD or CHF or asthma or COPD. He had been a smoker till about 5 years ago. He had used albuterol inhaler in the past.  Sleep-patient has history of FAUSTO on CPAP therapy at home. Prior echocardiogram EF 60-65% January 2018. Review of Systems   General ROS: negative for  - fever, chills, weight loss, fatigue and malaise  Hematological and Lymphatic ROS: negative for - blood clots, jaundice, swollen lymph nodes  Cardiovascular ROS: negative for - chest pain, murmur, orthopnea, palpitations or pnd  Gastrointestinal ROS: no nausea, vomiting, abdominal pain  Dermatological ROS: negative for - pruritus, rash or skin lesion changes        Past Medical History:   Diagnosis Date    Adverse effect of anesthesia     took long time coming out with back surgery done at [facility].  Thinks it was related to Fentanyl    Arthritis     Diabetes ([facility] [address] 1982    type 2    Diverticulitis     Gastrointestinal disorder     diverticulitis    GERD (gastroesophageal reflux disease)     no meds now, was on Nexium previously    Gout     Hypertension     Hypovolemic shock ([facility] [address] 7/25/2020    Lower GI bleed 7/23/2020    Lumbar stenosis with neurogenic claudication 7/26/2017    Other ill-defined conditions(799.89) 2012    Diverticulitis, hospitalized x 4 days    Surgery, elective 8/31/2015    Thromboembolus ([address]     behind left knee was on Xarelto stopped 8-1-15. On no anticoagulation now    Unspecified adverse effect of anesthesia     pt states he \"had trouble coming to, took too long\"    Unspecified sleep apnea     CPAP user. Pt instructed to bring CPAP on DOS      Past Surgical History:   Procedure Laterality Date    COLONOSCOPY N/A 6/13/2016    COLONOSCOPY AND performed by Opal Carpenter MD at [facility] ENDOSCOPY    HX [address] [facility]     ORIF right ankle   [facility] SURGERY  2002 & 2005 ,2012    HX CARPAL TUNNEL RELEASE      HX COLECTOMY      part of colon removed due to diverticulitis    HX HERNIA REPAIR      left Ing hernia repair x3    HX ORTHOPAEDIC  2010    left    HX ORTHOPAEDIC  2012    Rem. hardware right ankle    HX RETINAL DETACHMENT REPAIR  1996      Prior to Admission medications    Medication Sig Start Date End Date Taking? Authorizing Provider   vitamin K-I-Z-lutein-minerals (OCUVITE) tablet 1 Tab two (2) times a day. Yes Provider, Historical   polyvinyl alcohol-povidon,PF, (REFRESH CLASSIC) 1.4-0.6 % ophthalmic solution Administer 1-2 Drops to both eyes as needed. Yes Provider, Historical   tamsulosin (Flomax) 0.4 mg capsule Take 0.4 mg by mouth daily. Yes Other, MD Marcia   albuterol (PROVENTIL HFA, VENTOLIN HFA, PROAIR HFA) 90 mcg/actuation inhaler Take 2 Puffs by inhalation daily as needed for Wheezing. Yes Other, MD Marcia   Magnesium Oxide 500 mg cap Take 1 Cap by mouth. Yes Other, MD Marcia   methocarbamol (ROBAXIN) 500 mg tablet Take 1 Tab by mouth two (2) times daily as needed (Musculoskeletal pain; muscle spasms). 9/13/19  Yes Owen Herrera,    amLODIPine-valsartan (EXFORGE)  mg per tablet Take 1 Tab by mouth daily. Yes Provider, Historical   polyethylene glycol (MIRALAX) 17 gram/dose powder Take 17 g by mouth daily as needed. Yes Provider, Historical   carboxymethylcellulose sodium (REFRESH LIQUIGEL) 1 % dlgl Apply  to eye two (2) times a day. Indications: DRY EYE   Yes Provider, Historical   melatonin tab tablet Take 5 mg by mouth nightly. Yes Provider, Historical   multivitamin (ONE A DAY) tablet Take 1 Tab by mouth daily. Yes Provider, Historical   testosterone (FORTESTA) 10 mg/0.5 gram /actuation glpm by TransDERmal route daily. Indications: 1 pump each thigh daily in am 10 mg   Yes Provider, Historical   metoprolol succinate (TOPROL-XL) 100 mg XL tablet Take 100 mg by mouth daily. Take with sip of water dos   Indications: HYPERTENSION   Yes Provider, Historical   telmisartan-hydrochlorothiazide (MICARDIS HCT) 80-12.5 mg per tablet Take 1 Tab by mouth daily. Take with sip of water dos  Indications: HYPERTENSION   Yes Other, MD Marcia   atorvastatin (LIPITOR) 10 mg tablet Take 10 mg by mouth nightly. Yes Other, MD Marcia   sitaGLIPtin (JANUVIA) 100 mg tablet Take 100 mg by mouth daily. Yes Other, MD Marcia   furosemide (LASIX) 20 mg tablet Take 20 mg by mouth as needed. Indications: Edema   Yes Other, MD Marcia   allopurinol (ZYLOPRIM) 100 mg tablet Take 100 mg by mouth two (2) times a day. Yes Other, MD Marcia   glyBURIDE-metFORMIN (GLUCOVANCE) 2.5-500 mg per tablet Take 1 Tab by mouth as needed. For Glucose >150 in the evening   Yes Other, MD Marcia   aspirin delayed-release 81 mg tablet Take  by mouth daily. Provider, Historical   tadalafil (CIALIS) 5 mg tablet Take 5 mg by mouth as needed. Provider, Historical   potassium chloride SR (KLOR-CON 8) 8 mEq tablet Take 8 mEq by mouth daily as needed. Indications: HYPOKALEMIA PREVENTION    Other, MD aMrcia     Allergies   Allergen Reactions    Norvasc [Amlodipine] Cough    Tolectin [Tolmetin] Hives      Social History     Tobacco Use    Smoking status: Former Smoker     Packs/day: 0.25     Last attempt to quit: 2014     Years since quittin.5    Smokeless tobacco: Never Used   Substance Use Topics    Alcohol use:  Yes     Alcohol/week: 5.0 standard drinks     Types: 6 Standard drinks or equivalent per week     Frequency: 2-3 times a week     Comment: rare      Family History   Problem Relation Age of Onset    Hypertension Mother     Malignant Hyperthermia Neg Hx         Current Facility-Administered Medications   Medication Dose Route Frequency    0.45% sodium chloride with KCl 20 mEq/L infusion   IntraVENous CONTINUOUS    carboxymethylcellulose sodium (REFRESH PLUS) 0.5 % ophthalmic solution 1 Drop  1 Drop Both Eyes BID    vitamin I-G-Q-lutein-minerals (OCUVITE) tablet 1 Tab  1 Tab Oral BID    pantoprazole (PROTONIX) 40 mg in 0.9% sodium chloride 10 mL injection  40 mg IntraVENous Q12H    lidocaine 4 % patch 1 Patch  1 Patch TransDERmal Q24H    allopurinoL (ZYLOPRIM) tablet 100 mg  100 mg Oral BID    sodium chloride (NS) flush 5-40 mL  5-40 mL IntraVENous Q8H    atorvastatin (LIPITOR) tablet 10 mg  10 mg Oral QHS    metoprolol succinate (TOPROL-XL) tablet 100 mg  100 mg Oral DAILY    telmisartan (MICARDIS) tablet 80 mg  80 mg Oral DAILY    insulin glargine (LANTUS) injection 10 Units  10 Units SubCUTAneous DAILY    insulin lispro (HUMALOG) injection   SubCUTAneous AC&HS       Objective:   Vital Signs:    Blood pressure 132/61, pulse (!) 101, temperature 98 °F (36.7 °C), resp. rate 14, height 5' 9\" (1.753 m), weight 90.7 kg (200 lb), SpO2 99 %. Body mass index is 29.53 kg/m². O2 Device: Room air   Temp (24hrs), Av °F (36.7 °C), Min:97.6 °F (36.4 °C), Max:98.4 °F (36.9 °C)       Intake/Output:   Last shift:      No intake/output data recorded. Last 3 shifts:  1901 -  0700  In: 3382.6 [P.O.:830;  I.V.:1551.3]  Out: 45532 [Urine:25708]    Intake/Output Summary (Last 24 hours) at 2020 0844  Last data filed at 2020 0640  Gross per 24 hour   Intake 2962.05 ml   Output 8401 ml   Net -5438.95 ml         Physical Exam:   Gene: AAO x 3, comfortable; on RA; appears elderly and good for age  [de-identified]: pupils not dilated, no scleral jaundice, moist oral mucosa, no nasal drainage; pale tongue and conjunctiva  Neck: No adenopathy or thyroid swelling  CVS: S1S2 no murmurs; JVD not elevated  RS: moderate air entry bilaterally, no wheezes or crackles; symmetrical BS; normal respirations at rest  Abd: protuberant, soft, non tender, no hepatosplenomegaly, no abd distension, no guarding or rigidity or rebound, bowel sounds heard  Neuro: AAO x 3, moving all extremities, nonfocal exam  Extrm: no leg edema or swelling or clubbing  Skin: no rash  Lymphatic: no cervical or supraclavicular adenopathy    Telemetry: Normal sinus rhythm      Data review:     Recent Results (from the past 24 hour(s))   GLUCOSE, POC    Collection Time: 07/26/20 11:21 AM   Result Value Ref Range    Glucose (POC) 199 (H) 70 - 972 mg/dL   METABOLIC PANEL, BASIC    Collection Time: 07/26/20  2:00 PM   Result Value Ref Range    Sodium 143 136 - 145 mmol/L    Potassium 3.1 (L) 3.5 - 5.5 mmol/L    Chloride 114 (H) 100 - 111 mmol/L    CO2 23 21 - 32 mmol/L    Anion gap 6 3.0 - 18 mmol/L    Glucose 133 (H) 74 - 99 mg/dL    BUN 15 7.0 - 18 MG/DL    Creatinine 1.15 0.6 - 1.3 MG/DL    BUN/Creatinine ratio 13 12 - 20      GFR est AA >60 >60 ml/min/1.73m2    GFR est non-AA >60 >60 ml/min/1.73m2    Calcium 7.3 (L) 8.5 - 10.1 MG/DL   HGB & HCT    Collection Time: 07/26/20  2:00 PM   Result Value Ref Range    HGB 8.2 (L) 13.0 - 16.0 g/dL    HCT 23.8 (L) 36.0 - 48.0 %   PROTHROMBIN TIME + INR    Collection Time: 07/26/20  2:00 PM   Result Value Ref Range    Prothrombin time 16.3 (H) 11.5 - 15.2 sec    INR 1.3 (H) 0.8 - 1.2     MAGNESIUM    Collection Time: 07/26/20  2:00 PM   Result Value Ref Range    Magnesium 1.7 1.6 - 2.6 mg/dL   GLUCOSE, POC    Collection Time: 07/26/20  4:04 PM   Result Value Ref Range    Glucose (POC) 112 (H) 70 - 110 mg/dL   GLUCOSE, POC    Collection Time: 07/26/20  9:27 PM   Result Value Ref Range    Glucose (POC) 172 (H) 70 - 110 mg/dL   HGB & HCT    Collection Time: 07/26/20 10:58 PM   Result Value Ref Range    HGB 8.8 (L) 13.0 - 16.0 g/dL    HCT 25.8 (L) 36.0 - 48.0 %   PLASMA, ALLOCATE    Collection Time: 07/26/20 11:00 PM   Result Value Ref Range    Unit number [id number]     Blood component type FP 24h, Thaw     Unit division 00     Status of unit ISSUED    MAGNESIUM    Collection Time: 07/27/20  4:45 AM   Result Value Ref Range    Magnesium 1.7 1.6 - 2.6 mg/dL   CBC WITH AUTOMATED DIFF    Collection Time: 07/27/20  4:45 AM   Result Value Ref Range    WBC 9.6 4.6 - 13.2 K/uL    RBC 2.31 (L) 4.70 - 5.50 M/uL    HGB 7.3 (L) 13.0 - 16.0 g/dL    HCT 21.5 (L) 36.0 - 48.0 %    MCV 93.1 74.0 - 97.0 FL    MCH 31.6 24.0 - 34.0 PG    MCHC 34.0 31.0 - 37.0 g/dL    RDW 16.4 (H) 11.6 - 14.5 %    PLATELET 391 (L) 261 - 420 K/uL    MPV 10.3 9.2 - 11.8 FL    NEUTROPHILS 70 40 - 73 %    LYMPHOCYTES 19 (L) 21 - 52 %    MONOCYTES 7 3 - 10 %    EOSINOPHILS 4 0 - 5 %    BASOPHILS 0 0 - 2 %    ABS. NEUTROPHILS 6.7 1.8 - 8.0 K/UL    ABS. LYMPHOCYTES 1.8 0.9 - 3.6 K/UL    ABS. MONOCYTES 0.7 0.05 - 1.2 K/UL    ABS. EOSINOPHILS 0.4 0.0 - 0.4 K/UL    ABS. BASOPHILS 0.0 0.0 - 0.1 K/UL    DF AUTOMATED     METABOLIC PANEL, COMPREHENSIVE    Collection Time: 07/27/20  4:45 AM   Result Value Ref Range    Sodium 147 (H) 136 - 145 mmol/L    Potassium 3.5 3.5 - 5.5 mmol/L    Chloride 117 (H) 100 - 111 mmol/L    CO2 23 21 - 32 mmol/L    Anion gap 7 3.0 - 18 mmol/L    Glucose 187 (H) 74 - 99 mg/dL    BUN 11 7.0 - 18 MG/DL    Creatinine 1.17 0.6 - 1.3 MG/DL    BUN/Creatinine ratio 9 (L) 12 - 20      GFR est AA >60 >60 ml/min/1.73m2    GFR est non-AA 60 (L) >60 ml/min/1.73m2    Calcium 7.2 (L) 8.5 - 10.1 MG/DL    Bilirubin, total 0.8 0.2 - 1.0 MG/DL    ALT (SGPT) 16 16 - 61 U/L    AST (SGOT) 21 10 - 38 U/L    Alk.  phosphatase 49 45 - 117 U/L    Protein, total 4.2 (L) 6.4 - 8.2 g/dL    Albumin 2.3 (L) 3.4 - 5.0 g/dL    Globulin 1.9 (L) 2.0 - 4.0 g/dL    A-G Ratio 1.2 0.8 - 1.7     PROTHROMBIN TIME + INR    Collection Time: 07/27/20  4:45 AM Result Value Ref Range    Prothrombin time 17.2 (H) 11.5 - 15.2 sec    INR 1.4 (H) 0.8 - 1.2     GLUCOSE, POC    Collection Time: 07/27/20  7:22 AM   Result Value Ref Range    Glucose (POC) 209 (H) 70 - 110 mg/dL   LACTIC ACID    Collection Time: 07/27/20  8:02 AM   Result Value Ref Range    Lactic acid 1.3 0.4 - 2.0 MMOL/L           Recent Labs     07/24/20 2043   FIO2I 0.44   HCO3I 17.4*   PCO2I 27.0*   PHI 7.42   PO2I 89       All Micro Results     None          ECHO January 2018 [place]  OVERALL IMPRESSIONS:  1. The cardiac chambers are of normal size. 2.There is mild-to-moderate concentric left ventricular hypertrophy present with grade 1 diastolic relaxation abnormalities. The E/E' ratio is increased, suggesting elevated left atrial filling pressure. The regional wall motion is normal with an ejection fraction estimated to be 60% to 65%. 3.No significant stenotic or regurgitant valvular lesions are noted. 4.No right ventricular systolic pressure could be estimated in the absence of a full TR jet envelope. The right heart size and configuration are normal.  5.No clots, vegetations, or pericardial effusion are identified. Imaging:  [x]I have personally reviewed the patients chest radiographs images and report   Chest x-ray 1 view 7/25/2020  FINDINGS:  SUPPORT DEVICES: Right jugular central venous catheter tip projects at the  distal SVC. Nasogastric tube is present with its tip projecting just into the  gastric fundus, and side-port probably just proximal to the EG junction. HEART AND MEDIASTINUM: Aorta is tortuous as previously seen. Cardiac silhouette  is within normal range in size. LUNGS AND PLEURAL SPACES: Pulmonary vessels are normal. Lungs are clear. Costophrenic angles are sharp. No pneumothorax. BONY THORAX AND SOFT TISSUES: No acute osseous abnormality.   IMPRESSION:  Nasogastric tube tip in the stomach, with its side port just above the EG  junction, recommend advancing at least 10 cm.  No active cardiopulmonary disease. 7/24/20  Results from Hospital Encounter encounter on 07/22/20   CT HEAD WO CONT    Narrative EXAM: CT head    INDICATION: Confusion    COMPARISON: None. TECHNIQUE: Axial CT imaging of the head was performed without intravenous  contrast. One or more dose reduction techniques were used on this CT: automated  exposure control, adjustment of the mAs and/or kVp according to patient size,  and iterative reconstruction techniques. The specific techniques used on this  CT exam have been documented in the patient's electronic medical record. Digital  Imaging and Communications in Medicine (DICOM) format image data are available  to nonaffiliated external healthcare facilities or entities on a secure, media  free, reciprocally searchable basis with patient authorization for at least a  12-month period after this study. _______________    FINDINGS:    BRAIN AND POSTERIOR FOSSA: The sulci, folia, ventricles and basal cisterns are  within normal limits for the patient's age. There is no intracranial hemorrhage,  mass effect, or midline shift. There are no areas of abnormal parenchymal  attenuation. EXTRA-AXIAL SPACES AND MENINGES: There are no abnormal extra-axial fluid  collections. CALVARIUM: Intact. SINUSES: Clear. OTHER: None.    _______________      Impression IMPRESSION:    No acute intracranial abnormalities. CT abdomen/pelvis with contrast 7/24/2020  COMPARISON: GI bleeding scan dated 7/23/2020  FINDINGS:  LOWER CHEST: Basilar atelectasis and/or scarring noted. Coronary artery  calcifications identified. LIVER, BILIARY: Liver is normal. No biliary dilation. Gallbladder is  unremarkable. PANCREAS: Normal.  SPLEEN: Normal.  ADRENALS: Normal.  KIDNEYS/URETERS/BLADDER: No suspicious renal masses or hydronephrosis. PELVIC ORGANS: Unremarkable. LYMPH NODES: No enlarged lymph nodes.   GASTROINTESTINAL TRACT: Prior sigmoid colon resection with anastomosis in the  pelvis. Scattered diverticula identified. There are some scattered areas of  subtle hyperdensity within the stomach, duodenum, and portions of the distal  colon although are seen on both noncontrast and postcontrast imaging. No  convincing evidence of active GI bleeding identified. VASCULATURE: Atherosclerosis.   BONES: Degenerative changes throughout the spine with prior thoracolumbar  fusion. Left L2 transpedicular screw is positioned lateral to the vertebral  body. OTHER: None. IMPRESSION:  1. Prior sigmoid colon resection with anastomosis in the pelvis. Colonic  diverticulosis again identified. No convincing evidence of active GI bleeding  identified.  Scattered areas of hyperdensity within bowel lumen in the stomach as  well as portions of the colon seen on both precontrast and postcontrast imaging  are nonspecific.       Nuclear medicine RBC scan 7/23/2020  IMPRESSION:    No evidence of active GI bleeding.  Marlon Urbano MD

## 2020-07-28 LAB
ABO + RH BLD: NORMAL
BLD PROD TYP BPU: NORMAL
BLOOD GROUP ANTIBODIES SERPL: NORMAL
BPU ID: NORMAL
CALLED TO:,BCALL1: NORMAL
COVID-19 RAPID TEST, COVR: NOT DETECTED
CROSSMATCH RESULT,%XM: NORMAL
SARS-COV-2, COV2NT: NOT DETECTED
SOURCE, COVRS: NORMAL
SPECIMEN EXP DATE BLD: NORMAL
STATUS OF UNIT,%ST: NORMAL
UNIT DIVISION, %UDIV: 0

## (undated) DEVICE — MEDI-VAC NON-CONDUCTIVE SUCTION TUBING: Brand: CARDINAL HEALTH

## (undated) DEVICE — PACK PROCEDURE SURG LAMINECTOMY SPINE CUST

## (undated) DEVICE — DRAPE,UTILITY,XL,4/PK,STERILE: Brand: MEDLINE

## (undated) DEVICE — REM POLYHESIVE ADULT PATIENT RETURN ELECTRODE: Brand: VALLEYLAB

## (undated) DEVICE — MOUTHPIECE ENDOSCP 20X27MM --

## (undated) DEVICE — SOL IRRIGATION INJ NACL 0.9% 500ML BTL

## (undated) DEVICE — DERMABOND SKIN ADH 0.7ML -- DERMABOND ADVANCED 12/BX

## (undated) DEVICE — STERILE POLYISOPRENE POWDER-FREE SURGICAL GLOVES: Brand: PROTEXIS

## (undated) DEVICE — DRAPE MICSCP W54XL150IN STD OCU CVR CLEARLENS TECHNOLOGY FOR

## (undated) DEVICE — TRAP SPEC COLL POLYP POLYSTYR --

## (undated) DEVICE — GELFOAM SZ 100 SPNG

## (undated) DEVICE — MAJ-1414 SINGLE USE ADPATER BIOPSY VALV: Brand: SINGLE USE ADAPTOR BIOPSY VALVE

## (undated) DEVICE — SUTURE VCRL + SZ 2-0 L18IN ABSRB VLT CT-2 1/2 CIR TAPERCUT VCP726D

## (undated) DEVICE — TOOL 9MH30 LEGEND 9CM 3MM MH: Brand: MIDAS REX

## (undated) DEVICE — KENDALL RADIOLUCENT FOAM MONITORING ELECTRODE RECTANGULAR SHAPE: Brand: KENDALL

## (undated) DEVICE — NDL INJ SCLERO 25G 240CM -- INTERJECT M00518360 BX/5

## (undated) DEVICE — GOWN,SIRUS,NONRNF,SETINSLV,XL,20/CS: Brand: MEDLINE

## (undated) DEVICE — SINGLE PORT MANIFOLD: Brand: NEPTUNE 2

## (undated) DEVICE — BANDAGE,GAUZE,BULKEE II,4.5"X4.1YD,STRL: Brand: MEDLINE

## (undated) DEVICE — GARMENT,MEDLINE,DVT,INT,CALF,MED, GEN2: Brand: MEDLINE

## (undated) DEVICE — CORD BPLR L12FT DISP

## (undated) DEVICE — X-RAY SPONGES,12 PLY: Brand: DERMACEA

## (undated) DEVICE — CLIP HEMO ENDOSCP 235CM BX/10 -- RESOLUTION 360

## (undated) DEVICE — SUT VCRL + 0 27IN CT2 UD --

## (undated) DEVICE — ENDO CARRY-ON PROCEDURE KIT INCLUDES ENZYMATIC SPONGE, GAUZE, BIOHAZARD LABEL, TRAY, LUBRICANT, DIRTY SCOPE LABEL, WATER LABEL, TRAY, DRAWSTRING PAD, AND DEFENDO 4-PIECE KIT.: Brand: ENDO CARRY-ON PROCEDURE KIT

## (undated) DEVICE — KENDALL SCD EXPRESS SLEEVES, KNEE LENGTH, MEDIUM: Brand: KENDALL SCD

## (undated) DEVICE — NDL SPNE QNCKE 18GX3.5IN LF --

## (undated) DEVICE — TOOL 9MH30D LEGEND 9CM 3MM MH DIAM: Brand: MIDAS REX

## (undated) DEVICE — CATH IV AUTOGRD ORN 14GA 45MM -- INSYTE-N

## (undated) DEVICE — SURGIFOAM SPNG SZ 100

## (undated) DEVICE — (D)PREP SKN CHLRAPRP APPL 26ML -- CONVERT TO ITEM 371833

## (undated) DEVICE — SUT MONOCRYL PLUS UD 4-0 --

## (undated) DEVICE — 3M™ STERI-DRAPE™ INSTRUMENT POUCH 1018: Brand: STERI-DRAPE™

## (undated) DEVICE — DEVON™ KNEE AND BODY STRAP 60" X 3" (1.5 M X 7.6 CM): Brand: DEVON

## (undated) DEVICE — DRAPE TWL SURG 16X26IN BLU ORB04] ALLCARE INC]

## (undated) DEVICE — SPONGE GZ W4XL4IN COT 12 PLY TYP VII WVN C FLD DSGN

## (undated) DEVICE — SUTURE VCRL + SZ 1 L18IN ABSRB UD L36MM CT-1 1/2 CIR VCP841D

## (undated) DEVICE — SYR LR LCK 1ML GRAD NSAF 30ML --